# Patient Record
Sex: MALE | Race: AMERICAN INDIAN OR ALASKA NATIVE
[De-identification: names, ages, dates, MRNs, and addresses within clinical notes are randomized per-mention and may not be internally consistent; named-entity substitution may affect disease eponyms.]

---

## 2019-02-05 ENCOUNTER — HOSPITAL ENCOUNTER (INPATIENT)
Dept: HOSPITAL 42 - ED | Age: 59
LOS: 8 days | Discharge: NURSING, REHAB OR LONG-TERM CARE | DRG: 308 | End: 2019-02-13
Attending: INTERNAL MEDICINE | Admitting: INTERNAL MEDICINE
Payer: COMMERCIAL

## 2019-02-05 VITALS — BODY MASS INDEX: 72.5 KG/M2

## 2019-02-05 DIAGNOSIS — I11.0: ICD-10-CM

## 2019-02-05 DIAGNOSIS — L89.322: ICD-10-CM

## 2019-02-05 DIAGNOSIS — R53.2: ICD-10-CM

## 2019-02-05 DIAGNOSIS — I87.2: ICD-10-CM

## 2019-02-05 DIAGNOSIS — E66.2: ICD-10-CM

## 2019-02-05 DIAGNOSIS — T50.1X5A: ICD-10-CM

## 2019-02-05 DIAGNOSIS — I08.2: ICD-10-CM

## 2019-02-05 DIAGNOSIS — E87.4: ICD-10-CM

## 2019-02-05 DIAGNOSIS — Z87.81: ICD-10-CM

## 2019-02-05 DIAGNOSIS — R26.9: ICD-10-CM

## 2019-02-05 DIAGNOSIS — E55.9: ICD-10-CM

## 2019-02-05 DIAGNOSIS — L03.116: ICD-10-CM

## 2019-02-05 DIAGNOSIS — D50.9: ICD-10-CM

## 2019-02-05 DIAGNOSIS — I27.20: ICD-10-CM

## 2019-02-05 DIAGNOSIS — J44.1: ICD-10-CM

## 2019-02-05 DIAGNOSIS — J96.22: ICD-10-CM

## 2019-02-05 DIAGNOSIS — L97.929: ICD-10-CM

## 2019-02-05 DIAGNOSIS — Z91.19: ICD-10-CM

## 2019-02-05 DIAGNOSIS — J20.9: ICD-10-CM

## 2019-02-05 DIAGNOSIS — I48.91: Primary | ICD-10-CM

## 2019-02-05 DIAGNOSIS — Z74.01: ICD-10-CM

## 2019-02-05 DIAGNOSIS — I50.43: ICD-10-CM

## 2019-02-05 DIAGNOSIS — L89.312: ICD-10-CM

## 2019-02-05 DIAGNOSIS — L97.919: ICD-10-CM

## 2019-02-05 DIAGNOSIS — J44.0: ICD-10-CM

## 2019-02-05 DIAGNOSIS — L03.115: ICD-10-CM

## 2019-02-05 DIAGNOSIS — E78.5: ICD-10-CM

## 2019-02-05 DIAGNOSIS — E87.5: ICD-10-CM

## 2019-02-05 LAB
% IRON SATURATION: 8 % (ref 20–55)
ALBUMIN SERPL-MCNC: 3.7 G/DL (ref 3–4.8)
ALBUMIN/GLOB SERPL: 0.8 {RATIO} (ref 1.1–1.8)
ALT SERPL-CCNC: 18 U/L (ref 7–56)
APTT BLD: 30 SECONDS (ref 26.9–38.3)
ARTERIAL BLOOD GAS HEMOGLOBIN: 8.4 G/DL (ref 11.7–17.4)
ARTERIAL BLOOD GAS HEMOGLOBIN: 8.5 G/DL (ref 11.7–17.4)
ARTERIAL BLOOD GAS HEMOGLOBIN: 8.9 G/DL (ref 11.7–17.4)
ARTERIAL BLOOD GAS O2 SAT: 97.9 % (ref 95–98)
ARTERIAL BLOOD GAS O2 SAT: 98.5 % (ref 95–98)
ARTERIAL BLOOD GAS O2 SAT: 99 % (ref 95–98)
ARTERIAL BLOOD GAS O2 SAT: 99.4 % (ref 95–98)
ARTERIAL BLOOD GAS PCO2: 76 MM/HG (ref 35–45)
ARTERIAL BLOOD GAS PCO2: 78 MM/HG (ref 35–45)
ARTERIAL BLOOD GAS PCO2: 83 MM/HG (ref 35–45)
ARTERIAL BLOOD GAS PCO2: 95 MM/HG (ref 35–45)
ARTERIAL BLOOD GAS TCO2: 32.8 MMOL.L (ref 22–28)
ARTERIAL BLOOD GAS TCO2: 34.3 MMOL.L (ref 22–28)
ARTERIAL BLOOD GAS TCO2: 34.9 MMOL.L (ref 22–28)
ARTERIAL BLOOD GAS TCO2: 37.6 MMOL.L (ref 22–28)
AST SERPL-CCNC: 62 U/L (ref 17–59)
BNP SERPL-MCNC: 2910 PG/ML (ref 0–450)
BUN SERPL-MCNC: 30 MG/DL (ref 7–21)
CALCIUM SERPL-MCNC: 8.1 MG/DL (ref 8.4–10.5)
ERYTHROCYTE [DISTWIDTH] IN BLOOD BY AUTOMATED COUNT: 20.2 % (ref 11.5–14.5)
ERYTHROCYTE [DISTWIDTH] IN BLOOD BY AUTOMATED COUNT: 20.2 % (ref 11.5–14.5)
FERRITIN SERPL-MCNC: 20.2 NG/ML
FOLATE SERPL-MCNC: 7.7 NG/ML
GFR NON-AFRICAN AMERICAN: > 60
HCO3 BLDA-SCNC: 30.3 MMOL/L (ref 21–28)
HCO3 BLDA-SCNC: 31.9 MMOL/L (ref 21–28)
HCO3 BLDA-SCNC: 32.6 MMOL/L (ref 21–28)
HCO3 BLDA-SCNC: 34.7 MMOL/L (ref 21–28)
HDLC SERPL-MCNC: 12 MG/DL (ref 29–60)
HGB BLD-MCNC: 8.4 G/DL (ref 14–18)
HGB BLD-MCNC: 8.9 G/DL (ref 14–18)
INHALED O2 CONCENTRATION: 100 %
INHALED O2 CONCENTRATION: 100 %
INHALED O2 CONCENTRATION: 40 %
INHALED O2 CONCENTRATION: 90 %
INR PPP: 1.67
IRON SERPL-MCNC: 27 UG/DL (ref 45–180)
LDLC SERPL-MCNC: 67 MG/DL (ref 0–129)
MCH RBC QN AUTO: 19.1 PG (ref 25–35)
MCH RBC QN AUTO: 19.5 PG (ref 25–35)
MCHC RBC AUTO-ENTMCNC: 25.8 G/DL (ref 31–37)
MCHC RBC AUTO-ENTMCNC: 26.3 G/DL (ref 31–37)
MCV RBC AUTO: 72.7 FL (ref 80–105)
MCV RBC AUTO: 75.7 FL (ref 80–105)
O2 CAP BLDA-SCNC: 11.9 ML/DL (ref 16–24)
O2 CAP BLDA-SCNC: 12.1 ML/DL (ref 16–24)
O2 CAP BLDA-SCNC: 12.4 ML/DL (ref 16–24)
O2 CT BLDA-SCNC: 11.8 ML/DL (ref 15–23)
O2 CT BLDA-SCNC: 12 ML/DL (ref 15–23)
O2 CT BLDA-SCNC: 12.1 ML/DL (ref 15–23)
PH BLDA: 7.17 [PH] (ref 7.35–7.45)
PH BLDA: 7.17 [PH] (ref 7.35–7.45)
PH BLDA: 7.22 [PH] (ref 7.35–7.45)
PH BLDA: 7.24 [PH] (ref 7.35–7.45)
PLATELET # BLD: 241 10^3/UL (ref 120–450)
PLATELET # BLD: 308 10^3/UL (ref 120–450)
PMV BLD AUTO: 9.1 FL (ref 7–11)
PMV BLD AUTO: 9.8 FL (ref 7–11)
PO2 BLDA: 105 MM/HG (ref 80–100)
PO2 BLDA: 107 MM/HG (ref 80–100)
PO2 BLDA: 162 MM/HG (ref 80–100)
PO2 BLDA: 213 MM/HG (ref 80–100)
PROTHROMBIN TIME: 18.5 SECONDS (ref 9.4–12.5)
RBC # BLD AUTO: 4.4 10^6/UL (ref 3.5–6.1)
RBC # BLD AUTO: 4.56 10^6/UL (ref 3.5–6.1)
T4 FREE SERPL-MCNC: 1.08 NG/DL (ref 0.78–2.19)
T4 SERPL-MCNC: 3.9 UG/DL (ref 5.5–11)
TIBC SERPL-MCNC: 322 UG/DL (ref 261–462)
TROPONIN I SERPL-MCNC: 0.02 NG/ML
VIT B12 SERPL-MCNC: 797 PG/ML (ref 239–931)
WBC # BLD AUTO: 11 10^3/UL (ref 4.5–11)
WBC # BLD AUTO: 9.2 10^3/UL (ref 4.5–11)

## 2019-02-05 PROCEDURE — 3E033GC INTRODUCTION OF OTHER THERAPEUTIC SUBSTANCE INTO PERIPHERAL VEIN, PERCUTANEOUS APPROACH: ICD-10-PCS | Performed by: INTERNAL MEDICINE

## 2019-02-05 PROCEDURE — 30233N1 TRANSFUSION OF NONAUTOLOGOUS RED BLOOD CELLS INTO PERIPHERAL VEIN, PERCUTANEOUS APPROACH: ICD-10-PCS | Performed by: INTERNAL MEDICINE

## 2019-02-05 PROCEDURE — 5A09457 ASSISTANCE WITH RESPIRATORY VENTILATION, 24-96 CONSECUTIVE HOURS, CONTINUOUS POSITIVE AIRWAY PRESSURE: ICD-10-PCS | Performed by: INTERNAL MEDICINE

## 2019-02-05 PROCEDURE — 3E0F7GC INTRODUCTION OF OTHER THERAPEUTIC SUBSTANCE INTO RESPIRATORY TRACT, VIA NATURAL OR ARTIFICIAL OPENING: ICD-10-PCS | Performed by: INTERNAL MEDICINE

## 2019-02-05 RX ADMIN — DILTIAZEM HYDROCHLORIDE PRN MLS/HR: 100 INJECTION, POWDER, LYOPHILIZED, FOR SOLUTION INTRAVENOUS at 03:25

## 2019-02-05 RX ADMIN — METHYLPREDNISOLONE SODIUM SUCCINATE SCH MG: 40 INJECTION, POWDER, FOR SOLUTION INTRAMUSCULAR; INTRAVENOUS at 14:32

## 2019-02-05 RX ADMIN — DILTIAZEM HYDROCHLORIDE PRN MLS/HR: 100 INJECTION, POWDER, LYOPHILIZED, FOR SOLUTION INTRAVENOUS at 22:26

## 2019-02-05 RX ADMIN — HEPARIN SODIUM PRN MLS/HR: 10000 INJECTION, SOLUTION INTRAVENOUS at 14:33

## 2019-02-05 RX ADMIN — IPRATROPIUM BROMIDE AND ALBUTEROL SULFATE SCH ML: .5; 3 SOLUTION RESPIRATORY (INHALATION) at 21:00

## 2019-02-05 RX ADMIN — METHYLPREDNISOLONE SODIUM SUCCINATE SCH MG: 40 INJECTION, POWDER, FOR SOLUTION INTRAMUSCULAR; INTRAVENOUS at 21:54

## 2019-02-05 RX ADMIN — LINEZOLID SCH MLS/HR: 600 INJECTION, SOLUTION INTRAVENOUS at 21:53

## 2019-02-05 NOTE — CP.PCM.CON
<Eddy Jerome - Last Filed: 02/05/19 07:08>





History of Present Illness





- History of Present Illness


History of Present Illness: 


Consult Note for ICU, Dr. Margarita Jerome, DO PGY-1





This is a 58 y o male with PMHx morbid obesity, leg cellulitis, and chronic 

lymphedema, who presents to the ED c/o shortness of breath worsening for the 

past 7 days. Reason for ICU consult was for worsening respiratory distress, 

possible new-onset arrhythmia. Pt denies any inciting factors prior to onset of 

symptoms but notes that he has been becoming more short of breath with exertion 

at home and more recently at rest. Of note pt reports recent travel to 

[place] via airplane at the end of January. Denies hx sick contacts or recent

URI. Reports last time he had dyspnea like this he was admitted to [facility] 

in [place], but denies being treated with BiPap or ever being intubated in the 

past. Denies fever, chills, chest pain, n/v/d/c, abd pain, urinary complaints, 

or other symptoms. 





PMhx: morbid obesity, leg cellulitis, and chronic lymphedema


PSurgHx: surgery for R ankle fracture in 2006, I+D of RLE for cellulitis in 2016


Allergies: NKDA


Home meds: none


Fam hx: noncontributory


Soc hx: denies smoking, EtOH or illicit drug use





PMD: none





Review of Systems





- Constitutional


Constitutional: Fatigue.  absent: Chills, Fever, Headache, Night Sweats, Sleep 

Apnea





- Cardiovascular


Cardiovascular: Dyspnea, Dyspnea on Exertion, Leg Edema.  absent: Chest Pain, 

Palpitations





- Respiratory


Respiratory: Dyspnea, Dyspnea on Exertion.  absent: Cough, Wheezing, Chest 

Congestion, Excessive Mucous Production





- Gastrointestinal


Gastrointestinal: absent: Abdominal Pain, Constipation, Diarrhea, Nausea, 

Vomiting





- Genitourinary


Genitourinary: absent: Dysuria, Urinary Frequency, Urinary Urgency





- Neurological


Neurological: absent: Dizziness, Numbness, Headaches, Tingling





Past Patient History





- Past Social History


Smoking Status: Never Smoked





- CARDIAC


Hx Cardiac Disorders: Yes


Hx Hypertension: Yes





- PULMONARY


Hx Respiratory Disorders: No





- NEUROLOGICAL


Hx Neurological Disorder: No





- HEENT


Hx HEENT Problems: No





- RENAL


Hx Chronic Kidney Disease: No





- ENDOCRINE/METABOLIC


Hx Endocrine Disorders: No





- HEMATOLOGICAL/ONCOLOGICAL


Hx Blood Transfusions: Yes


Hx Blood Transfusion Reaction: No





- INTEGUMENTARY


Hx Dermatological Problems: No


Other/Comment: BILATERAL LYMPEDEMA WITH 2 LARGE OPENING ONE IS TO RIGHT LOWER 

LATERAL SIDE OF RIGHT LEG AND 2ND TO BACKSIDE OF THIGH.DRAINING COPIOUS 

SEROUSANGUINEOUS DRAINAGE,





- MUSCULOSKELETAL/RHEUMATOLOGICAL


Hx Musculoskeletal Disorders: Yes





- GASTROINTESTINAL


Hx Gastrointestinal Disorders: No





- GENITOURINARY/GYNECOLOGICAL


Hx Genitourinary Disorders: No





- PSYCHIATRIC


Hx Psychophysiologic Disorder: No


Hx Substance Use: No





- SURGICAL HISTORY


Hx Orthopedic Surgery: Yes (right ankle)





- ANESTHESIA


Hx Anesthesia Reactions: No


Hx Malignant Hyperthermia: No





Meds


Allergies/Adverse Reactions: 


                                    Allergies











Allergy/AdvReac Type Severity Reaction Status Date / Time


 


No Known Allergies Allergy   Verified 03/18/16 19:49














- Medications


Medications: 


                               Current Medications





Acetaminophen (Tylenol 325mg Tab)  650 mg PO Q6 PRN


   PRN Reason: TEMP>=99.5F


Acetaminophen (Tylenol 650 Mg Supp)  650 mg RC Q6H PRN


   PRN Reason: TEMP>=99.5F


Atorvastatin Calcium (Lipitor)  40 mg PO DIN TREV


Docusate Sodium (Colace)  100 mg PO TID TREV


Enoxaparin Sodium (Lovenox)  240 mg SC Q12H TREV; Protocol


Furosemide (Lasix)  40 mg IVP Q8H TREV


diltiaZEM IVPB 100mg in NS (Cardizem 100mg In Ns)  100 mls @ 5 mls/hr IV .Q20H 

PRN; Protocol


   PRN Reason: TITRATE PER MD ORDER


   Last Admin: 02/05/19 03:25 Dose:  5 mls/hr


Vancomycin HCl (Vancomycin 1gm)  1 gm in 250 mls @ 167 mls/hr IVPB Q12H TREV; P

rotocol


Meropenem/Sodium Chloride (Merrem Iv 500 Mg/Ns 50 Ml)  500 mg in 50 mls @ 100 

mls/hr IVPB Q8 TREV; Protocol


   Stop: 02/05/19 14:29


Ondansetron HCl (Zofran Inj)  4 mg IVP Q4H PRN


   PRN Reason: Nausea/Vomiting


Pantoprazole Sodium (Protonix Ec Tab)  40 mg PO 0600 TREV











Physical Exam





- Constitutional


Appears: Non-toxic, No Acute Distress





- Head Exam


Head Exam: ATRAUMATIC, NORMOCEPHALIC





- Eye Exam


Eye Exam: EOMI, Normal appearance, PERRL





- ENT Exam


ENT Exam: Mucous Membranes Moist





- Respiratory Exam


Respiratory Exam: Wheezes, Respiratory Distress.  absent: Rales, Rhonchi





- Cardiovascular Exam


Cardiovascular Exam: Tachycardia, Irregular Rhythm, +S1, +S2.  absent: Gallop, 

Rubs, Systolic Murmur





- GI/Abdominal Exam


GI & Abdominal Exam: Normal Bowel Sounds, Soft.  absent: Distended, Guarding, O

rganomegaly, Rebound, Rigid, Tenderness





- Extremities Exam


Extremities exam: Positive for: joint swelling, normal capillary refill, pedal 

pulses present


Additional comments: 


Marked lymphedema noted in extremities b/l, 





- Skin


Additional comments: 


Ulcer noted on R lower lateral side of R leg and 2nd location to backside of 

thigh, draining copious serosanguinous drainage





Results





- Vital Signs


Recent Vital Signs: 


                                Last Vital Signs











Temp  98.9 F   02/05/19 06:20


 


Pulse  89   02/05/19 06:20


 


Resp  27 H  02/05/19 06:20


 


BP  156/73 H  02/05/19 06:20


 


Pulse Ox  100   02/05/19 06:20














- Labs


Result Diagrams: 


                                 02/05/19 03:10





                                 02/05/19 03:10


Labs: 


                         Laboratory Results - last 24 hr











  02/05/19 02/05/19 02/05/19





  03:10 03:10 03:10


 


WBC   9.2 


 


RBC   4.40 


 


Hgb   8.4 L 


 


Hct   32.0 L 


 


MCV   72.7 L 


 


MCH   19.1 L 


 


MCHC   26.3 L 


 


RDW   20.2 H 


 


Plt Count   308 


 


MPV   9.8 


 


PT    18.5 H


 


INR    1.67


 


APTT    30.0


 


pCO2   


 


pO2   


 


HCO3   


 


ABG pH   


 


ABG Total CO2   


 


ABG O2 Saturation   


 


ABG O2 Content   


 


ABG Base Excess   


 


ABG Hemoglobin   


 


ABG Carboxyhemoglobin   


 


POC ABG HHb (Measured)   


 


ABG Methemoglobin   


 


ABG O2 Capacity   


 


Hgb O2 Saturation   


 


FiO2   


 


Blood Gas Comments   


 


Crit Value Called To   


 


Crit Value Called By   


 


Blood Gas Notified Time   


 


Sodium  134  


 


Potassium  4.8  


 


Chloride  96 L  


 


Carbon Dioxide  32  


 


Anion Gap  10  


 


BUN  30 H  


 


Creatinine  1.0  


 


Est GFR ( Amer)  > 60  


 


Est GFR (Non-Af Amer)  > 60  


 


Random Glucose  117 H  


 


Calcium  8.1 L  


 


Total Bilirubin  0.9  


 


AST  62 H  


 


ALT  18  


 


Alkaline Phosphatase  40  


 


Lactate Dehydrogenase  598  


 


Total Creatine Kinase  91  


 


Troponin I  0.02  D  


 


NT-Pro-B Natriuret Pep  2910 H  


 


Total Protein  8.1  


 


Albumin  3.7  


 


Globulin  4.5  


 


Albumin/Globulin Ratio  0.8 L  














  02/05/19





  05:35


 


WBC 


 


RBC 


 


Hgb 


 


Hct 


 


MCV 


 


MCH 


 


MCHC 


 


RDW 


 


Plt Count 


 


MPV 


 


PT 


 


INR 


 


APTT 


 


pCO2  83 H*


 


pO2  162.0 H


 


HCO3  30.3 H


 


ABG pH  7.17 L*


 


ABG Total CO2  32.8 H


 


ABG O2 Saturation  99.4 H


 


ABG O2 Content  11.8 L


 


ABG Base Excess  0.9


 


ABG Hemoglobin  8.4 L


 


ABG Carboxyhemoglobin  2.5 H


 


POC ABG HHb (Measured)  0.6


 


ABG Methemoglobin  0.4


 


ABG O2 Capacity  11.9 L


 


Hgb O2 Saturation  96.4


 


FiO2  100.0


 


Blood Gas Comments  Ms prisca viramontes(rrt) will bring abg results to ed


 


Crit Value Called To  Reji aguilar rn ed


 


Crit Value Called By  Adelfo


 


Blood Gas Notified Time  554


 


Sodium 


 


Potassium 


 


Chloride 


 


Carbon Dioxide 


 


Anion Gap 


 


BUN 


 


Creatinine 


 


Est GFR ( Amer) 


 


Est GFR (Non-Af Amer) 


 


Random Glucose 


 


Calcium 


 


Total Bilirubin 


 


AST 


 


ALT 


 


Alkaline Phosphatase 


 


Lactate Dehydrogenase 


 


Total Creatine Kinase 


 


Troponin I 


 


NT-Pro-B Natriuret Pep 


 


Total Protein 


 


Albumin 


 


Globulin 


 


Albumin/Globulin Ratio 














Assessment & Plan





- Assessment and Plan (Free Text)


Assessment: 


This is a 58 y o male with PMHx morbid obesity, leg cellulitis, and chronic 

lymphedema, who presents to the ED c/o shortness of breath worsening for the 

past 7 days. Reason for ICU consult was for worsening respiratory distress, 

possible new-onset arrhythmia.


Plan: 


Neuro: 


AAOx3 currently. 


No changes in mental status.


Cont  to monitor. 





CV:


/73, HR 89, irregular rhythm noted on monitor. Per RN pt was in HR up to 

150s on presentation to ED. Was given Cardizem with improvement in rate control.

Currently on Cardizem drip. R/o atrial fibrillation. 


Cardiology consulted, recs appreciated 


BNP elevated at 2910 on admission. R/o CHF exacerbation. Lasix 40 IVP q8h. 


Maintain MAP>65.


Hemodynamically stable currently


Cont to monitor.





Pulm: 


Chronic resp acidosis compensated with metabolic acidosis, f/u repeat ABG


R/o PE as etiology based on pt history, obesity hypoventilation syndrome.


V/Q scan ordered due to pt being unable to fit into CT scan machine for CT angio

chest. 


Duplex U/s LE b/l prelim read neg for DVT.


On BiPap at rate of 18/6.  


Maintain O2 sat>90%


Continue with HOB elevation> 35 degrees, aspiration precautions. 


Continue with protected lung ventilation strategies 





GI: 


NPO. Continue with Protonix.





/Renal


BUN/Cr 30/1.0. Replace lytes as needed, maintain euvolemia. Continue to monitor.


 


ID:


Afebrile, no leukocytosis. 


Treat for cellulitis


Vanco/Merrem


ID consulted, recs appreciated


Blood, wound cxs pending





Endo:


A1c pending


Random glucose 117 on admission. 





Heme:


Hgb 8.4/32.  Plts 308.  Stable. Cont to monitor. 





Psych: 


Normal mood.





DVT ppx - Lovenox bid


GI ppx - Protonix bid





Pt seen, examined with, and plan discussed with Dr. Avila, attending physicia

darrick Jerome DO PGY-1, Family Medicine Resident


Pager #[phone]





<Mekhi Avila - Last Filed: 02/05/19 21:25>





Meds





- Medications


Medications: 


                               Current Medications





Acetaminophen (Tylenol 325mg Tab)  650 mg PO Q6 PRN


   PRN Reason: TEMP>=99.5F


Acetaminophen (Tylenol 650 Mg Supp)  650 mg RC Q6H PRN


   PRN Reason: TEMP>=99.5F


Albuterol/Ipratropium (Duoneb 3 Mg/0.5 Mg (3 Ml) Ud)  3 ml IH Q4H [facility]


Atorvastatin Calcium (Lipitor)  40 mg PO DIN [facility]


   Last Admin: 02/05/19 17:46 Dose:  Not Given


Docusate Sodium (Colace)  100 mg PO TID [facility]


   Last Admin: 02/05/19 17:46 Dose:  Not Given


Furosemide (Lasix)  40 mg IVP Q8H [facility]


   Last Admin: 02/05/19 14:32 Dose:  40 mg


diltiaZEM IVPB 100mg in NS (Cardizem 100mg In Ns)  100 mls @ 5 mls/hr IV .Q20H 

PRN; Protocol


   PRN Reason: TITRATE PER MD ORDER


   Last Admin: 02/05/19 03:25 Dose:  5 mls/hr


Linezolid (Zyvox 600mg/300ml D5w)  600 mg in 300 mls @ 200 mls/hr IVPB Q12 TREV; 

Protocol


   Stop: 02/12/19 22:01


Doxycycline Hyclate 100 mg/ (Sodium Chloride)  100 mls @ 100 mls/hr IVPB Q12 

TREV; Protocol


   Last Admin: 02/05/19 14:30 Dose:  100 mls/hr


Heparin Sodium/Sodium Chloride (Heparin 05871 Units/250ml 1/2 Normal Saline)  

25,000 units in 250 mls @ 20.02 mls/hr IV .[id number] PRN; Protocol


   PRN Reason: ADJUST RATE PER PROTOCOL


   Last Admin: 02/05/19 14:33 Dose:  8.25 units/kg/hr, 20.02 mls/hr


Methylprednisolone (Solu-Medrol)  20 mg IVP Q8H TREV


   Last Admin: 02/05/19 14:32 Dose:  20 mg


Ondansetron HCl (Zofran Inj)  4 mg IVP Q4H PRN


   PRN Reason: Nausea/Vomiting


Pantoprazole Sodium (Protonix Ec Tab)  40 mg PO 0600 [facility]











Results





- Vital Signs


Recent Vital Signs: 


                                Last Vital Signs











Temp  97.9 F   02/05/19 16:00


 


Pulse  79   02/05/19 21:18


 


Resp  24   02/05/19 19:00


 


BP  131/60   02/05/19 19:00


 


Pulse Ox  92 L  02/05/19 19:00














- Labs


Result Diagrams: 


                                 02/05/19 17:30





                                 02/05/19 03:10


Labs: 


                         Laboratory Results - last 24 hr











  02/05/19 02/05/19 02/05/19





  03:10 03:10 03:10


 


WBC   9.2 


 


RBC   4.40 


 


Hgb   8.4 L 


 


Hct   32.0 L 


 


MCV   72.7 L 


 


MCH   19.1 L 


 


MCHC   26.3 L 


 


RDW   20.2 H 


 


Plt Count   308 


 


MPV   9.8 


 


PT    18.5 H


 


INR    1.67


 


APTT    30.0


 


pCO2   


 


pO2   


 


HCO3   


 


ABG pH   


 


ABG Total CO2   


 


ABG O2 Saturation   


 


ABG O2 Content   


 


ABG Base Excess   


 


ABG Hemoglobin   


 


ABG Carboxyhemoglobin   


 


POC ABG HHb (Measured)   


 


ABG Methemoglobin   


 


ABG O2 Capacity   


 


ABG Potassium   


 


Hgb O2 Saturation   


 


Glucose   


 


Lactate   


 


FiO2   


 


Inspiratory BiPAP   


 


Blood Gas Comments   


 


Crit Value Called To   


 


Crit Value Called By   


 


Blood Gas Notified Time   


 


Sodium  134  


 


Potassium  4.8  


 


Chloride  96 L  


 


Carbon Dioxide  32  


 


Anion Gap  10  


 


BUN  30 H  


 


Creatinine  1.0  


 


Est GFR ( Amer)  > 60  


 


Est GFR (Non-Af Amer)  > 60  


 


Random Glucose  117 H  


 


Hemoglobin A1c   


 


Lactic Acid   


 


Calcium  8.1 L  


 


Iron   


 


TIBC   


 


% Saturation   


 


Ferritin   


 


Total Bilirubin  0.9  


 


AST  62 H  


 


ALT  18  


 


Alkaline Phosphatase  40  


 


Lactate Dehydrogenase  598  


 


Total Creatine Kinase  91  


 


Troponin I  0.02  D  


 


NT-Pro-B Natriuret Pep  2910 H  


 


Total Protein  8.1  


 


Albumin  3.7  


 


Globulin  4.5  


 


Albumin/Globulin Ratio  0.8 L  


 


Triglycerides   


 


Cholesterol   


 


LDL Cholesterol Direct   


 


HDL Cholesterol   


 


Vitamin B12   


 


25-OH Vitamin D Total   


 


Folate   


 


Free T4   


 


Thyroxine (T4)   


 


TSH 3rd Generation   


 


Arterial Blood Potassium   


 


Blood Type   


 


Antibody Screen   


 


Crossmatch   


 


BBK History Checked   














  02/05/19 02/05/19 02/05/19





  05:35 06:00 06:00


 


WBC   


 


RBC   


 


Hgb   


 


Hct   


 


MCV   


 


MCH   


 


MCHC   


 


RDW   


 


Plt Count   


 


MPV   


 


PT   


 


INR   


 


APTT   


 


pCO2  83 H*  


 


pO2  162.0 H  


 


HCO3  30.3 H  


 


ABG pH  7.17 L*  


 


ABG Total CO2  32.8 H  


 


ABG O2 Saturation  99.4 H  


 


ABG O2 Content  11.8 L  


 


ABG Base Excess  0.9  


 


ABG Hemoglobin  8.4 L  


 


ABG Carboxyhemoglobin  2.5 H  


 


POC ABG HHb (Measured)  0.6  


 


ABG Methemoglobin  0.4  


 


ABG O2 Capacity  11.9 L  


 


ABG Potassium   


 


Hgb O2 Saturation  96.4  


 


Glucose   


 


Lactate   


 


FiO2  100.0  


 


Inspiratory BiPAP   


 


Blood Gas Comments  Ms prisca viramontes(rrt) will bring abg results to ed  


 


Crit Value Called To  Reji aguilar rn ed  


 


Crit Value Called By  [facility]  


 


Blood Gas Notified Time  554  


 


Sodium   


 


Potassium   


 


Chloride   


 


Carbon Dioxide   


 


Anion Gap   


 


BUN   


 


Creatinine   


 


Est GFR ( Amer)   


 


Est GFR (Non-Af Amer)   


 


Random Glucose   


 


Hemoglobin A1c   


 


Lactic Acid   


 


Calcium   


 


Iron   


 


TIBC   


 


% Saturation   


 


Ferritin    20.2


 


Total Bilirubin   


 


AST   


 


ALT   


 


Alkaline Phosphatase   


 


Lactate Dehydrogenase   


 


Total Creatine Kinase    84


 


Troponin I    0.02


 


NT-Pro-B Natriuret Pep   


 


Total Protein   


 


Albumin   


 


Globulin   


 


Albumin/Globulin Ratio   


 


Triglycerides    103


 


Cholesterol    103 L


 


LDL Cholesterol Direct    67


 


HDL Cholesterol    12 L


 


Vitamin B12    797


 


25-OH Vitamin D Total   


 


Folate    7.7


 


Free T4   


 


Thyroxine (T4)   


 


TSH 3rd Generation   


 


Arterial Blood Potassium   


 


Blood Type   A POSITIVE 


 


Antibody Screen   Negative 


 


Crossmatch   See Detail 


 


BBK History Checked   Patient has bt 














  02/05/19 02/05/19 02/05/19





  06:00 06:45 10:10


 


WBC   


 


RBC   


 


Hgb   


 


Hct   


 


MCV   


 


MCH   


 


MCHC   


 


RDW   


 


Plt Count   


 


MPV   


 


PT   


 


INR   


 


APTT   


 


pCO2   


 


pO2   


 


HCO3   


 


ABG pH   


 


ABG Total CO2   


 


ABG O2 Saturation   


 


ABG O2 Content   


 


ABG Base Excess   


 


ABG Hemoglobin   


 


ABG Carboxyhemoglobin   


 


POC ABG HHb (Measured)   


 


ABG Methemoglobin   


 


ABG O2 Capacity   


 


ABG Potassium   


 


Hgb O2 Saturation   


 


Glucose   


 


Lactate   


 


FiO2   


 


Inspiratory BiPAP   


 


Blood Gas Comments   


 


Crit Value Called To   


 


Crit Value Called By   


 


Blood Gas Notified Time   


 


Sodium   


 


Potassium   


 


Chloride   


 


Carbon Dioxide   


 


Anion Gap   


 


BUN   


 


Creatinine   


 


Est GFR ( Amer)   


 


Est GFR (Non-Af Amer)   


 


Random Glucose   


 


Hemoglobin A1c  6.2  


 


Lactic Acid   0.8 


 


Calcium   


 


Iron    27 L


 


TIBC    322


 


% Saturation    8 L


 


Ferritin   


 


Total Bilirubin   


 


AST   


 


ALT   


 


Alkaline Phosphatase   


 


Lactate Dehydrogenase   


 


Total Creatine Kinase   


 


Troponin I   


 


NT-Pro-B Natriuret Pep   


 


Total Protein   


 


Albumin   


 


Globulin   


 


Albumin/Globulin Ratio   


 


Triglycerides   


 


Cholesterol   


 


LDL Cholesterol Direct   


 


HDL Cholesterol   


 


Vitamin B12   


 


25-OH Vitamin D Total   


 


Folate   


 


Free T4   


 


Thyroxine (T4)   


 


TSH 3rd Generation   


 


Arterial Blood Potassium   


 


Blood Type   


 


Antibody Screen   


 


Crossmatch   


 


BBK History Checked   














  02/05/19 02/05/19 02/05/19





  10:10 10:10 10:10


 


WBC   


 


RBC   


 


Hgb   


 


Hct   


 


MCV   


 


MCH   


 


MCHC   


 


RDW   


 


Plt Count   


 


MPV   


 


PT   


 


INR   


 


APTT   


 


pCO2   


 


pO2   


 


HCO3   


 


ABG pH   


 


ABG Total CO2   


 


ABG O2 Saturation   


 


ABG O2 Content   


 


ABG Base Excess   


 


ABG Hemoglobin   


 


ABG Carboxyhemoglobin   


 


POC ABG HHb (Measured)   


 


ABG Methemoglobin   


 


ABG O2 Capacity   


 


ABG Potassium   


 


Hgb O2 Saturation   


 


Glucose   


 


Lactate   


 


FiO2   


 


Inspiratory BiPAP   


 


Blood Gas Comments   


 


Crit Value Called To   


 


Crit Value Called By   


 


Blood Gas Notified Time   


 


Sodium   


 


Potassium   


 


Chloride   


 


Carbon Dioxide   


 


Anion Gap   


 


BUN   


 


Creatinine   


 


Est GFR ( Amer)   


 


Est GFR (Non-Af Amer)   


 


Random Glucose   


 


Hemoglobin A1c   


 


Lactic Acid   


 


Calcium   


 


Iron   


 


TIBC   


 


% Saturation   


 


Ferritin   


 


Total Bilirubin   


 


AST   


 


ALT   


 


Alkaline Phosphatase   


 


Lactate Dehydrogenase   


 


Total Creatine Kinase  75  


 


Troponin I  0.02  


 


NT-Pro-B Natriuret Pep   


 


Total Protein   


 


Albumin   


 


Globulin   


 


Albumin/Globulin Ratio   


 


Triglycerides   


 


Cholesterol   


 


LDL Cholesterol Direct   


 


HDL Cholesterol   


 


Vitamin B12   


 


25-OH Vitamin D Total    < 12.8 L


 


Folate   


 


Free T4   1.08 


 


Thyroxine (T4)   3.9 L 


 


TSH 3rd Generation   2.60 


 


Arterial Blood Potassium   


 


Blood Type   


 


Antibody Screen   


 


Crossmatch   


 


BBK History Checked   














  02/05/19 02/05/19 02/05/19





  11:25 15:10 17:30


 


WBC   


 


RBC   


 


Hgb   


 


Hct   


 


MCV   


 


MCH   


 


MCHC   


 


RDW   


 


Plt Count   


 


MPV   


 


PT   


 


INR   


 


APTT   


 


pCO2  95 H*  76 H* 


 


pO2  213.0 H  107.0 H 


 


HCO3  34.7 H  32.6 H 


 


ABG pH  7.17 L*  7.24 L 


 


ABG Total CO2  37.6 H  34.9 H 


 


ABG O2 Saturation  99.0 H  98.5 H 


 


ABG O2 Content  12.0 L  


 


ABG Base Excess  4.7 H  2.8 


 


ABG Hemoglobin  8.5 L  


 


ABG Carboxyhemoglobin  2.0 H  


 


POC ABG HHb (Measured)  1.0  


 


ABG Methemoglobin  0.6  


 


ABG O2 Capacity  12.1 L  


 


ABG Potassium   4.2 


 


Hgb O2 Saturation  96.3  


 


Glucose   90 


 


Lactate   0.6 L 


 


FiO2  90.0  40.0 


 


Inspiratory BiPAP   22 


 


Blood Gas Comments   


 


Crit Value Called To  [address] m. 


 


Crit Value Called By  lin Lino louis 


 


Blood Gas Notified Time  1138  1314 


 


Sodium   137.0 


 


Potassium   


 


Chloride   103.0 


 


Carbon Dioxide   


 


Anion Gap   


 


BUN   


 


Creatinine   


 


Est GFR ( Amer)   


 


Est GFR (Non-Af Amer)   


 


Random Glucose   


 


Hemoglobin A1c   


 


Lactic Acid   


 


Calcium   


 


Iron   


 


TIBC   


 


% Saturation   


 


Ferritin   


 


Total Bilirubin   


 


AST   


 


ALT   


 


Alkaline Phosphatase   


 


Lactate Dehydrogenase   


 


Total Creatine Kinase    67


 


Troponin I    0.02


 


NT-Pro-B Natriuret Pep   


 


Total Protein   


 


Albumin   


 


Globulin   


 


Albumin/Globulin Ratio   


 


Triglycerides   


 


Cholesterol   


 


LDL Cholesterol Direct   


 


HDL Cholesterol   


 


Vitamin B12   


 


25-OH Vitamin D Total   


 


Folate   


 


Free T4   


 


Thyroxine (T4)   


 


TSH 3rd Generation   


 


Arterial Blood Potassium   4.2 


 


Blood Type   


 


Antibody Screen   


 


Crossmatch   


 


BBK History Checked   














  02/05/19 02/05/19





  17:30 20:20


 


WBC  11.0 


 


RBC  4.56 


 


Hgb  8.9 L 


 


Hct  34.5 L 


 


MCV  75.7 L D 


 


MCH  19.5 L 


 


MCHC  25.8 L 


 


RDW  20.2 H 


 


Plt Count  241 


 


MPV  9.1 


 


PT  


 


INR  


 


APTT   55.1 H


 


pCO2  


 


pO2  


 


HCO3  


 


ABG pH  


 


ABG Total CO2  


 


ABG O2 Saturation  


 


ABG O2 Content  


 


ABG Base Excess  


 


ABG Hemoglobin  


 


ABG Carboxyhemoglobin  


 


POC ABG HHb (Measured)  


 


ABG Methemoglobin  


 


ABG O2 Capacity  


 


ABG Potassium  


 


Hgb O2 Saturation  


 


Glucose  


 


Lactate  


 


FiO2  


 


Inspiratory BiPAP  


 


Blood Gas Comments  


 


Crit Value Called To  


 


Crit Value Called By  


 


Blood Gas Notified Time  


 


Sodium  


 


Potassium  


 


Chloride  


 


Carbon Dioxide  


 


Anion Gap  


 


BUN  


 


Creatinine  


 


Est GFR ( Amer)  


 


Est GFR (Non-Af Amer)  


 


Random Glucose  


 


Hemoglobin A1c  


 


Lactic Acid  


 


Calcium  


 


Iron  


 


TIBC  


 


% Saturation  


 


Ferritin  


 


Total Bilirubin  


 


AST  


 


ALT  


 


Alkaline Phosphatase  


 


Lactate Dehydrogenase  


 


Total Creatine Kinase  


 


Troponin I  


 


NT-Pro-B Natriuret Pep  


 


Total Protein  


 


Albumin  


 


Globulin  


 


Albumin/Globulin Ratio  


 


Triglycerides  


 


Cholesterol  


 


LDL Cholesterol Direct  


 


HDL Cholesterol  


 


Vitamin B12  


 


25-OH Vitamin D Total  


 


Folate  


 


Free T4  


 


Thyroxine (T4)  


 


TSH 3rd Generation  


 


Arterial Blood Potassium  


 


Blood Type  


 


Antibody Screen  


 


Crossmatch  


 


BBK History Checked  














Attending/Attestation





- Attestation


I have personally seen and examined this patient.: Yes


I have fully participated in the care of the patient.: Yes


I have reviewed all pertinent clinical information: Yes

## 2019-02-05 NOTE — CP.PCM.HP
History of Present Illness





- History of Present Illness


History of Present Illness: 





Patient is a 58 y o male with PMHx morbid obesity, leg cellulitis, and chronic 

lymphedema, who presented to the ED with complaints of shortness of breath 

worsening for the past 7 days. History was provided by patient's significant 

other who states patient was recently back from a trip to [place]. Patient 

landed in [place] then took the train to [place]. Overnight patient  

patient was noted to be in respiratory distress and was therefore transferred to

the ICU where he was found to have a new onset arrtyhthmia.  Patient denies 

history of sick contacts or recent URI, fever, chills, chest pain, n/v/d/c, abd 

pain, urinary complaints, or other symptoms. 





PMhx: morbid obesity, leg cellulitis, and chronic lymphedema


PSurgHx: surgery for R ankle fracture in 2006, I+D of RLE for cellulitis in 2016


Allergies: NKDA


Home meds: none


Fam hx: noncontributory


Soc hx: denies smoking, EtOH or illicit drug use








Review of Systems





- Constitutional


Constitutional: Fatigue.  absent: Chills, Fever, Headache, Night Sweats, Sleep 

Apnea





- Cardiovascular


Cardiovascular: Dyspnea, Dyspnea on Exertion, Leg Edema.  absent: Chest Pain, 

Palpitations





- Respiratory


Respiratory: Dyspnea, Dyspnea on Exertion.  absent: Cough, Wheezing, Chest 

Congestion, Excessive Mucous Production





- Gastrointestinal


Gastrointestinal: absent: Abdominal Pain, Constipation, Diarrhea, Nausea, 

Vomiting





- Genitourinary


Genitourinary: absent: Dysuria, Urinary Frequency, Urinary Urgency





- Neurological


Neurological: absent: Dizziness, Numbness, Headaches, Tingling








Physical Exam





- Constitutional


Appears: Non-toxic, No Acute Distress





- Head Exam


Head Exam: ATRAUMATIC, NORMOCEPHALIC





- Eye Exam


Eye Exam: EOMI, Normal appearance, PERRL





- ENT Exam


ENT Exam: Mucous Membranes Moist





- Respiratory Exam


Respiratory Exam: Wheezes, Respiratory Distress.  absent: Rales, Rhonchi





- Cardiovascular Exam


Cardiovascular Exam: Tachycardia, Irregular Rhythm, +S1, +S2.  absent: Gallop, 

Rubs, Systolic Murmur





- GI/Abdominal Exam


GI & Abdominal Exam: Normal Bowel Sounds, Soft.  absent: Distended, Guarding, 

Organomegaly, Rebound, Rigid, Tenderness





- Extremities Exam


Extremities exam: Positive for: joint swelling, normal capillary refill, pedal 

pulses present


Additional comments: 


Marked lymphedema noted in bilateral lower extremities





- Skin


Additional comments: 


Ulcer noted on R lower lateral side of R leg and 2nd location to backside of 

thigh, draining copious serosanguinous drainage











Present on Admission





- Present on Admission


Any Indicators Present on Admission: No





Past Patient History





- Past Social History


Smoking Status: Never Smoked





- CARDIAC


Hx Cardiac Disorders: Yes


Hx Hypertension: Yes





- PULMONARY


Hx Respiratory Disorders: No





- NEUROLOGICAL


Hx Neurological Disorder: No





- HEENT


Hx HEENT Problems: No





- RENAL


Hx Chronic Kidney Disease: No





- ENDOCRINE/METABOLIC


Hx Endocrine Disorders: No





- HEMATOLOGICAL/ONCOLOGICAL


Hx Blood Transfusions: Yes


Hx Blood Transfusion Reaction: No





- INTEGUMENTARY


Hx Dermatological Problems: No


Other/Comment: BILATERAL LYMPEDEMA WITH 2 LARGE OPENING ONE IS TO RIGHT LOWER 

LATERAL SIDE OF RIGHT LEG AND 2ND TO BACKSIDE OF THIGH.DRAINING COPIOUS 

SEROUSANGUINEOUS DRAINAGE,





- MUSCULOSKELETAL/RHEUMATOLOGICAL


Hx Musculoskeletal Disorders: Yes





- GASTROINTESTINAL


Hx Gastrointestinal Disorders: No





- GENITOURINARY/GYNECOLOGICAL


Hx Genitourinary Disorders: No





- PSYCHIATRIC


Hx Psychophysiologic Disorder: No


Hx Substance Use: No





- SURGICAL HISTORY


Hx Orthopedic Surgery: Yes (right ankle)





- ANESTHESIA


Hx Anesthesia Reactions: No


Hx Malignant Hyperthermia: No





Meds


Allergies/Adverse Reactions: 


                                    Allergies











Allergy/AdvReac Type Severity Reaction Status Date / Time


 


No Known Allergies Allergy   Verified 03/18/16 19:49














Results





- Vital Signs


Recent Vital Signs: 





                                Last Vital Signs











Temp  98.1 F   02/05/19 11:33


 


Pulse  79   02/05/19 11:53


 


Resp  23   02/05/19 11:33


 


BP  136/76   02/05/19 11:33


 


Pulse Ox  99   02/05/19 10:00














- Labs


Result Diagrams: 


                                 02/06/19 06:00





                                 02/06/19 06:00


Labs: 





                         Laboratory Results - last 24 hr











  02/05/19 02/05/19 02/05/19





  03:10 03:10 03:10


 


WBC   9.2 


 


RBC   4.40 


 


Hgb   8.4 L 


 


Hct   32.0 L 


 


MCV   72.7 L 


 


MCH   19.1 L 


 


MCHC   26.3 L 


 


RDW   20.2 H 


 


Plt Count   308 


 


MPV   9.8 


 


PT    18.5 H


 


INR    1.67


 


APTT    30.0


 


pCO2   


 


pO2   


 


HCO3   


 


ABG pH   


 


ABG Total CO2   


 


ABG O2 Saturation   


 


ABG O2 Content   


 


ABG Base Excess   


 


ABG Hemoglobin   


 


ABG Carboxyhemoglobin   


 


POC ABG HHb (Measured)   


 


ABG Methemoglobin   


 


ABG O2 Capacity   


 


Hgb O2 Saturation   


 


FiO2   


 


Blood Gas Comments   


 


Crit Value Called To   


 


Crit Value Called By   


 


Blood Gas Notified Time   


 


Sodium  134  


 


Potassium  4.8  


 


Chloride  96 L  


 


Carbon Dioxide  32  


 


Anion Gap  10  


 


BUN  30 H  


 


Creatinine  1.0  


 


Est GFR ( Amer)  > 60  


 


Est GFR (Non-Af Amer)  > 60  


 


Random Glucose  117 H  


 


Hemoglobin A1c   


 


Lactic Acid   


 


Calcium  8.1 L  


 


Iron   


 


TIBC   


 


% Saturation   


 


Ferritin   


 


Total Bilirubin  0.9  


 


AST  62 H  


 


ALT  18  


 


Alkaline Phosphatase  40  


 


Lactate Dehydrogenase  598  


 


Total Creatine Kinase  91  


 


Troponin I  0.02  D  


 


NT-Pro-B Natriuret Pep  2910 H  


 


Total Protein  8.1  


 


Albumin  3.7  


 


Globulin  4.5  


 


Albumin/Globulin Ratio  0.8 L  


 


Triglycerides   


 


Cholesterol   


 


LDL Cholesterol Direct   


 


HDL Cholesterol   


 


Vitamin B12   


 


Folate   


 


Free T4   


 


Thyroxine (T4)   


 


TSH 3rd Generation   


 


Blood Type   


 


Antibody Screen   


 


Crossmatch   


 


BBK History Checked   














  02/05/19 02/05/19 02/05/19





  05:35 06:00 06:00


 


WBC   


 


RBC   


 


Hgb   


 


Hct   


 


MCV   


 


MCH   


 


MCHC   


 


RDW   


 


Plt Count   


 


MPV   


 


PT   


 


INR   


 


APTT   


 


pCO2  83 H*  


 


pO2  162.0 H  


 


HCO3  30.3 H  


 


ABG pH  7.17 L*  


 


ABG Total CO2  32.8 H  


 


ABG O2 Saturation  99.4 H  


 


ABG O2 Content  11.8 L  


 


ABG Base Excess  0.9  


 


ABG Hemoglobin  8.4 L  


 


ABG Carboxyhemoglobin  2.5 H  


 


POC ABG HHb (Measured)  0.6  


 


ABG Methemoglobin  0.4  


 


ABG O2 Capacity  11.9 L  


 


Hgb O2 Saturation  96.4  


 


FiO2  100.0  


 


Blood Gas Comments  Ms prisca viramontes(rrt) will bring abg results to ed  


 


Crit Value Called To  Reji aguilar rn ed  


 


Crit Value Called By  Jsm  


 


Blood Gas Notified Time  554  


 


Sodium   


 


Potassium   


 


Chloride   


 


Carbon Dioxide   


 


Anion Gap   


 


BUN   


 


Creatinine   


 


Est GFR ( Amer)   


 


Est GFR (Non-Af Amer)   


 


Random Glucose   


 


Hemoglobin A1c   


 


Lactic Acid   


 


Calcium   


 


Iron   


 


TIBC   


 


% Saturation   


 


Ferritin    20.2


 


Total Bilirubin   


 


AST   


 


ALT   


 


Alkaline Phosphatase   


 


Lactate Dehydrogenase   


 


Total Creatine Kinase    84


 


Troponin I    0.02


 


NT-Pro-B Natriuret Pep   


 


Total Protein   


 


Albumin   


 


Globulin   


 


Albumin/Globulin Ratio   


 


Triglycerides    103


 


Cholesterol    103 L


 


LDL Cholesterol Direct    67


 


HDL Cholesterol    12 L


 


Vitamin B12    797


 


Folate    7.7


 


Free T4   


 


Thyroxine (T4)   


 


TSH 3rd Generation   


 


Blood Type   A POSITIVE 


 


Antibody Screen   Negative 


 


Crossmatch   See Detail 


 


BBK History Checked   Patient has bt 














  02/05/19 02/05/19 02/05/19





  06:00 06:45 10:10


 


WBC   


 


RBC   


 


Hgb   


 


Hct   


 


MCV   


 


MCH   


 


MCHC   


 


RDW   


 


Plt Count   


 


MPV   


 


PT   


 


INR   


 


APTT   


 


pCO2   


 


pO2   


 


HCO3   


 


ABG pH   


 


ABG Total CO2   


 


ABG O2 Saturation   


 


ABG O2 Content   


 


ABG Base Excess   


 


ABG Hemoglobin   


 


ABG Carboxyhemoglobin   


 


POC ABG HHb (Measured)   


 


ABG Methemoglobin   


 


ABG O2 Capacity   


 


Hgb O2 Saturation   


 


FiO2   


 


Blood Gas Comments   


 


Crit Value Called To   


 


Crit Value Called By   


 


Blood Gas Notified Time   


 


Sodium   


 


Potassium   


 


Chloride   


 


Carbon Dioxide   


 


Anion Gap   


 


BUN   


 


Creatinine   


 


Est GFR ( Amer)   


 


Est GFR (Non-Af Amer)   


 


Random Glucose   


 


Hemoglobin A1c  6.2  


 


Lactic Acid   0.8 


 


Calcium   


 


Iron    27 L


 


TIBC    322


 


% Saturation    8 L


 


Ferritin   


 


Total Bilirubin   


 


AST   


 


ALT   


 


Alkaline Phosphatase   


 


Lactate Dehydrogenase   


 


Total Creatine Kinase   


 


Troponin I   


 


NT-Pro-B Natriuret Pep   


 


Total Protein   


 


Albumin   


 


Globulin   


 


Albumin/Globulin Ratio   


 


Triglycerides   


 


Cholesterol   


 


LDL Cholesterol Direct   


 


HDL Cholesterol   


 


Vitamin B12   


 


Folate   


 


Free T4   


 


Thyroxine (T4)   


 


TSH 3rd Generation   


 


Blood Type   


 


Antibody Screen   


 


Crossmatch   


 


BBK History Checked   














  02/05/19 02/05/19 02/05/19





  10:10 10:10 11:25


 


WBC   


 


RBC   


 


Hgb   


 


Hct   


 


MCV   


 


MCH   


 


MCHC   


 


RDW   


 


Plt Count   


 


MPV   


 


PT   


 


INR   


 


APTT   


 


pCO2    95 H*


 


pO2    213.0 H


 


HCO3    34.7 H


 


ABG pH    7.17 L*


 


ABG Total CO2    37.6 H


 


ABG O2 Saturation    99.0 H


 


ABG O2 Content    12.0 L


 


ABG Base Excess    4.7 H


 


ABG Hemoglobin    8.5 L


 


ABG Carboxyhemoglobin    2.0 H


 


POC ABG HHb (Measured)    1.0


 


ABG Methemoglobin    0.6


 


ABG O2 Capacity    12.1 L


 


Hgb O2 Saturation    96.3


 


FiO2    90.0


 


Blood Gas Comments   


 


Crit Value Called To    williams Boucher


 


Crit Value Called By    lin Lino


 


Blood Gas Notified Time    1138


 


Sodium   


 


Potassium   


 


Chloride   


 


Carbon Dioxide   


 


Anion Gap   


 


BUN   


 


Creatinine   


 


Est GFR ( Amer)   


 


Est GFR (Non-Af Amer)   


 


Random Glucose   


 


Hemoglobin A1c   


 


Lactic Acid   


 


Calcium   


 


Iron   


 


TIBC   


 


% Saturation   


 


Ferritin   


 


Total Bilirubin   


 


AST   


 


ALT   


 


Alkaline Phosphatase   


 


Lactate Dehydrogenase   


 


Total Creatine Kinase  75  


 


Troponin I  0.02  


 


NT-Pro-B Natriuret Pep   


 


Total Protein   


 


Albumin   


 


Globulin   


 


Albumin/Globulin Ratio   


 


Triglycerides   


 


Cholesterol   


 


LDL Cholesterol Direct   


 


HDL Cholesterol   


 


Vitamin B12   


 


Folate   


 


Free T4   1.08 


 


Thyroxine (T4)   3.9 L 


 


TSH 3rd Generation   2.60 


 


Blood Type   


 


Antibody Screen   


 


Crossmatch   


 


BBK History Checked   














Assessment & Plan





- Assessment and Plan (Free Text)


Assessment: 





This is a 58 y o male with PMHx morbid obesity, leg cellulitis, and chronic lym

phedema, who presents to the ED c/o shortness of breath worsening for the past 7

days. Reason for ICU consult was for worsening respiratory distress, possible 

new-onset arrhythmia.


Plan: 


Neuro: 


AAOx3 currently. 


No changes in mental status.


Cont  to monitor. 





CV:


/73, HR 89, irregular rhythm noted on monitor. Per RN pt was in HR up to 

150s on presentation to ED. Was given Cardizem with improvement in rate control.

Currently on Cardizem drip. R/o atrial fibrillation. 


Cardiology consulted, recs appreciated 


BNP elevated at 2910 on admission. R/o CHF exacerbation. Lasix 40 IVP q8h. 


Maintain MAP>65.


Hemodynamically stable currently


Cont to monitor.





Pulm: 


Chronic resp acidosis compensated with metabolic acidosis, f/u repeat ABG


R/o PE as etiology based on pt history, obesity hypoventilation syndrome.


V/Q scan ordered due to pt being unable to fit into CT scan machine for CT angio

chest. 


Duplex U/s LE b/l prelim read neg for DVT.


On BiPap at rate of 18/6.  


Maintain O2 sat>90%


Continue with HOB elevation> 35 degrees, aspiration precautions. 


Continue with protected lung ventilation strategies 





GI: 


NPO. Continue with Protonix.





/Renal


BUN/Cr 30/1.0. Replace electrolytes as needed, maintain euvolemia. Continue to 

monitor.


 


ID:


Afebrile, no leukocytosis. 


Treat for cellulitis


Vanco/Merrem


ID consulted, recs appreciated


Blood, wound cxs pending





Endo:


A1c pending


Maintain euglycemia, normothermia





Heme:


Hgb 8.4/32.  Plts 308.  Stable. Cont to monitor. 





Psych: 


Normal mood.





DVT ppx - Lovenox bid


GI ppx - Protonix bid

## 2019-02-05 NOTE — US
HISTORY:

Leg pain and swelling. Evaluate for DVT



PHYSICIAN(S):  Johnny Segura MD.



TECHNIQUE:

Duplex sonography and color-flow Doppler with graded compression were 

used to evaluate the deep venous systems of both lower extremities. 



FINDINGS:

The exam is extremely limited by body habitus.  The lower femoral 

veins, popliteal veins, and tibial veins are not adequately seen. 



IMPRESSION:

No sonographic evidence for deep venous thrombosis in the visualized 

segments of both lower extremities. 



Extremely limited study

## 2019-02-05 NOTE — HP
DATE OF EXAM:  02/05/2019



HISTORY OF PRESENT ILLNESS:  The patient is a 58-year-old male who has been

under my care since 2016, came to the emergency room with bilateral

increasing leg swelling.  The patient states that he has not seen any

doctor since his last discharge from the rehab in 2016.  The patient is

mostly homebound and bedridden according to the patient's significant

other.  The patient was brought to the emergency room as a walk-in patient.

The patient presented with increasing leg swelling and shortness of breath.



CODE STATUS:  Full code.



LIVING WILL ADVANCE DIRECTIVE:  None.



ALLERGIES:  NONE.



Height is 6 feet.



BMI is 72.



Weight is 535 pounds.



HOME MEDICATIONS:  Are incorrect, these are the medications from the

discharge of 2016.  The patient is not taking any medications.



OCCUPATIONAL HISTORY:  Disabled.



SOCIAL HISTORY:  Negative for smoking, alcohol, or drug use.  Negative for

communicable transmissible disease.



PAST MEDICAL, SURGICAL, SOCIAL HISTORY:  The patient has a history of right

lower extremity MRSA, corynebacterium, acinetobacter baumannii cellulitis,

right leg, right calf abscess and ulceration, history of bilateral lower

extremity lymphedema, history of super massive obesity, history of incision

and drainage of the right leg abscess, history of hypertension, history of

anemia with packed red blood cell transfusion, history of hypoxemia,

history of hypovitaminosis D, history of possible alpha thalassemia,

history of iron-deficiency anemia, history of hypogonadism with decreased

testosterone level and morbid obesity, history of hypertensive heart

disease, history of mild aortic regurgitation, history of dilated left

ventricle, history of concentric left ventricular hypertrophy, history of

left ventricular ejection fraction 69%, history of systemic inflammatory

response syndrome, history of hypoalbuminemia, history of gait dysfunction,

history of super morbid obesity, history of noncompliance, history of right

ankle fracture, history of lymphedema, history of right ankle surgery,

history of cardiomegaly, history of sepsis with right leg cellulitis,

history of methicillin-resistant Staphylococcus aureus, corynebacterium,

acinetobacter baumannii, bilateral lower extremity cellulitis and abscess,

history of severe gait dysfunction.



The patient was seen in the emergency room, then in the ICU.



PHYSICAL EXAMINATION:

GENERAL:  The patient is seen lying in the bed.  The patient is massively

obese.

VITAL SIGNS:  T-max is 98.9.  Telemetry initially shows an AFib with rapid

ventricular response of 114 down to 99 down to 89.  Blood pressure 144/78,

153/73, 153/79; respirations 18, 28, 36, 27; O2 sat on 100% nonrebreather

92-98%.  The patient is seen lying in the bed.  The patient is presently on

CPAP.

HEENT AND NECK:  Head:  Normocephalic, atraumatic.  HEENT examination shows

pinkish pale conjunctivae.  Dry oral mucosa.  No neck rigidity.

CHEST:  Symmetrical.

LUNGS:  Show decreased breath sounds at the bases.  Positive creps,

crackles, rhonchi noted upper lung field.

CARDIOVASCULAR:  Shows S1, S2 with irregular rhythm.  Positive systolic

murmur left sternal border, right second intercostal space, left second

intercostal space.

ABDOMEN:  Morbidly obese.  Unable to appreciate any hepatosplenomegaly

_____.

GENITALIA:  Could not be examined,

EXTREMITIES:  Lower extremity shows chronic and severe lymphedema with

chronic skin changes and overgrown toenails and poor foot hygiene.

MUSCULOSKELETAL:  Shows a body mass index of 72.6.  Weight is greater than

535 pounds.  Gait examination not tested.

PSYCHIATRIC:  Not applicable.



DIAGNOSTICS:  Show hemoglobin/hematocrit 8.4 and 32.  PT/PTT 18.5 and 30.0.

ABG shows a pH of 7.17, pCO2 of 83 pO2 of 162, bicarb 31, saturation of

99.1.  Sodium 134, potassium 4.8, chloride 96, CO2 32, anion gap 10, BUN

30, creatinine 1.0, GFR greater than 60, glucose 117, lactic acid 0.8,

calcium 8.1.  AST 62, troponin 0.02.  BNP 2910.  Cholesterol 103,

triglycerides 103, LDL 67, HDL 12.  The patient had a chest x-ray done,

which shows cardiomegaly.  Venous Doppler of the lower extremities was done

in the emergency room, which was limited study, but negative for DVT.



The patient was seen in the emergency room by the ER physician.  EKG shows

new onset atrial fibrillation, heart rate in low 100s and 110 beats per

minute.



IMPRESSION AND PLAN

1.  New-onset atrial fibrillation with rapid to well-controlled ventricular

response.

2.  Possible acute systolic congestive heart failure.

3.  Massive lymphedema of the lower extremity.

4.  Anemia.

5.  Hypertension.

6.  Tachypnea.

7.  Hypoxemia.

8.  Microcytic anemia.

9.  Respiratory acidosis with hypercarbia and CO2 narcosis.

10.  Prerenal kidney injury.

11.  Hyperglycemia.

12.  Cardiomegaly.

13.  Anemia.

14.  Neurosis.

15.  Chronic venous stasis ulceration of the bilateral lower extremity.

16.  Hypertension.

17.  Gait dysfunction.

18.  Super morbid obesity with gait dysfunction and possible functional

quadriplegia.



PLAN:  At this time, the patient is admitted to Intensive Care Unit.  The

patient's B12, iron studies, hemoglobin A1c, lipid panel have been ordered.

Iron studies, thyroid panel, vitamin D, serial cardiac enzymes, serial labs

ordered.  Erythropoietin, vitamin D 25-hydroxy.  Repeat ABG ordered. 

Current consultation, Infectious Disease, Cardiology and Podiatry.  The

patient will be ordered 1 unit of PRBC today.  The patient is presently on

Cardizem drip 5 mg an hour, Colace 100 three times a day, Lasix was given

100 mg in the ER.  The patient is on Lasix 40 IV every 8 hours, Lipitor 40

mg daily, Lovenox 240 mg subcutaneously every 12 hours.  The patient is

started on meropenem 500 mg IV every 8 hours, Protonix 40 mg daily for GI

prophylaxis, Tylenol p.r.n., p.o. suppository.  The patient is also ordered

vancomycin 1 g IV every 12 hours, Zofran 4 IV every 4 hours.  VQ scan has

been ordered.  The patient has been ordered BiPAP, 18/6 FIO2 90%, rate of

15 by the medical ICU resident.  The patient has been ordered echo with

Doppler.  The patient has been ordered out of the bed at 30 degrees.  At

present, the patient was seen and examined in the ICU with the patient's

significant other at bedside.  The patient has been explained about the

details of his medical condition, need for further diagnostic therapeutic

intervention, need for further treatment, need for evaluation by other

consultants, and need for further diagnostic therapeutic intervention.  All

details discussed and explained to the patient and the patient's

significant other, and all questions concerned answered



The patient's overall prognosis is guarded to poor.



Time spent in the critical care management including review of the data,

examining the patient, discussion with the family and entire management is

more than 1 hour 55 minutes.



Dictated and electronically signed, not read.







__________________________________________

Varinder Mooney MD





DD:  02/05/2019 8:28:17

DT:  02/05/2019 8:34:41

[facility] # [id number]

## 2019-02-05 NOTE — CON
DATE:  02/05/2019



HISTORY OF PRESENT ILLNESS:  The patient is 58-year-old gentleman with

history of morbid obesity, OHS with chronic

CO2 retention, who presented to [facility] ER last night with

shortness of breath.  He reports that his symptoms started a few days

earlier and was getting progressively worse.  There were no alleviating or

aggravating factors.  Not associated with chest pain.  He also reports that

he had new skin changes in his right lower leg, but he denies fever,

chills, sweats, nausea, vomiting, diarrhea, or constipation..  He reports

that these episodes of shortness of breath started shortly after his lone

air travel as well..  Upon evaluation at [facility] ER, he was

found to have new-onset atrial fibrillation as well as significant

respiratory acidosis.  The patient denies sick contacts or recent URI.



PAST MEDICAL HISTORY:  Morbid obesity, leg cellulitis, chronic lymphedema.



PAST SURGICAL HISTORY:  The patient had surgery for right ankle fracture in

2006, I and D of right lower extremity cellulitis in 2016.



ALLERGIES:  NKDA.



HOME MEDICATIONS:  None.



SOCIAL HISTORY:  No alcohol or illicit drug abuse.  No tobacco smoking.



REVIEW OF SYSTEM:  Review of 12-organ system other than mentioned in

history of present illness is negative.



PHYSICAL EXAMINATION:

VITAL SIGNS:  Blood pressure 136/76, temperature 98.1, heart rate 79,

oxygen saturation 99% on 40% FIO2.

ENT:  Head and neck atraumatic.  Very obese.

LUNGS:  Few wheezes bilaterally.

HEART:  Irregular rate and rhythm.  S1, S2 distant.

ABDOMEN:  Soft, nontender, nondistended.

MUSCULOSKELETAL:  Very obese.

NEUROLOGIC:  The patient moves all extremities spontaneously.

SKIN:  Moist.

PSYCHIATRIC:  The patient lethargic, however, responding to commands.



LABORATORY DATA:  WBC 9.2, hemoglobin 8.4, platelet count 308.  Sodium 134,

potassium 4.8, chloride 96, carbon dioxide 32, BUN 30, creatinine 1,

glucose 117.  ProBNP 2910.  AST 62, ALT 18, total bilirubin 0.9.  CPK 74. 

Troponin 0.02.  INR 1.67.  ABG 7.17/83/162 on 100% FIO2.  However, ABG 6

hours later showed 7.17/95/213 on 40% FIO2.



Chest x-ray appears to show bilateral vascular congestion.  However, it may

be overlapping adipose tissue.  The official read; no active pulmonary

disease.  EKG showed atrial fibrillation.  VQ scan was ordered by nighttime

team and the patient is currently at VQ scan.



CURRENT MEDICATION:  DuoNeb every 4 hours, Lipitor, Cardizem drip, Colace,

doxycycline, Lasix 40 mg IV every 8 hours, heparin drip, Solu-Medrol 20 mg

IV every 8 hours, Zofran p.r.n., Protonix, linezolid.



ASSESSMENT AND PLAN:  This is a 58-year-old gentleman with hypercapnic

respiratory failure, most likely due to combination of factors including

OHS with some component of chronic

obstructive pulmonary disease. Besides reduced FRC due to severe obesity 
increases airway resistance adding to pathogenesis of chronic hypercapnic 
respiratory failure. The patient does have history of

significant diastolic dysfunction and it is likely that it also plays some

role in worsening of the patient's ventilatory status, via increased airway's 
edema and thus bronchial resistance .  We will order

follow up echo to better qualitate/quantitate LV diastolic function.  We will 
proceed with antibiotics, steroid taper,

bronchodilators.  Acute cellulitis is a possibility and ID service is

following the patient as well.  The patient is on linezolid and doxycycline

to cover for potential soft tissue as well as lung source of infection.  We

will continue to target euvolemia, euglycemia, normothermia and saturation

more than 90%.  We will continue with maintaining mean arterial pressure

more than 65 avoiding hyperchloremia and nephrotoxins but not at expense of

treating underlying disease.  We will maintain blood glucose within 140-180

range according to NICE-SUGAR trial.  We will get blood culture,

urine culture, procalcitonin.  We will have low threshold for intubation. 

BiPAP was increased from 18 to 22..  If subsequent ABG will not improve, we

will proceed with endotracheal intubation.  I spoke with anesthesiology

service for the backup as the patient is morbidly obese.  We will start the

patient on therapeutic anticoagulation to prevent stroke in the setting of

atrial fibrillation as well.  Cardiology consult, echocardiogram.



Addendum: repeated ABG showed improved ventilation with ph 7.24 (from 7.17) and 
pc02 down to 70s from 90s. Will continue with BPAP, serial ABGs, low threshold 
for intubation (in which case anesthesia service should be present at 
bedside)-->that was signed out to nighttime hospitalist.



ccm time 40 min







__________________________________________

Joshua He MD





DD:  02/05/2019 13:09:15

DT:  02/05/2019 13:14:04

[facility] # [id number]



LUIS

## 2019-02-05 NOTE — CARD
--------------- APPROVED REPORT --------------





Date of service: 02/05/2019



EKG Measurement

Heart [id number]

[id number]

[id number]

[id number]



<Conclusion>

Atrial fibrillation with rapid ventricular response with premature 

ventricular or aberrantly conducted complexes

Possible Inferior infarct, age undetermined

Poor R wave progression in Precordial leads

Abnormal ECG

## 2019-02-05 NOTE — CP.PCM.CON
<Tacos Murphy - Last Filed: 02/05/19 14:47>





History of Present Illness





- History of Present Illness


History of Present Illness: 





ID Consult Note





58 year old male with past medical history of chronic lymphedema, cellulitis, 

and morbid obesity presents to the hospital for worsening shortness of breath x 

7 days. Patient is obtunded in ICU, history obtained from prior medical records 

and fiance at bedside. Patient has had symptoms like this in the past in which 

he was admitted to [facility].











Medica hx: morbid obesity, leg cellulitis, and chronic lymphedema


Surgical Hx: surgery for R ankle fracture in 2006, I+D of RLE for cellulitis in 

2016


Allergies: NKDA


Medications: Reviewed, as per MAR


Family hx: Unknown


Social hx: Denies smoking, EtOH or illicit drug use





Review of Systems





- Review of Systems


Systems not reviewed;Unavailable: Respiratory Distress





Past Patient History





- Past Social History


Smoking Status: Never Smoked





- CARDIAC


Hx Cardiac Disorders: Yes


Hx Hypertension: Yes





- PULMONARY


Hx Respiratory Disorders: No





- NEUROLOGICAL


Hx Neurological Disorder: No





- HEENT


Hx HEENT Problems: No





- RENAL


Hx Chronic Kidney Disease: No





- ENDOCRINE/METABOLIC


Hx Endocrine Disorders: No





- HEMATOLOGICAL/ONCOLOGICAL


Hx Blood Transfusions: Yes


Hx Blood Transfusion Reaction: No





- INTEGUMENTARY


Hx Dermatological Problems: No


Other/Comment: BILATERAL LYMPEDEMA WITH 2 LARGE OPENING ONE IS TO RIGHT LOWER 

LATERAL SIDE OF RIGHT LEG AND 2ND TO BACKSIDE OF THIGH.DRAINING COPIOUS 

SEROUSANGUINEOUS DRAINAGE,





- MUSCULOSKELETAL/RHEUMATOLOGICAL


Hx Musculoskeletal Disorders: Yes





- GASTROINTESTINAL


Hx Gastrointestinal Disorders: No





- GENITOURINARY/GYNECOLOGICAL


Hx Genitourinary Disorders: No





- PSYCHIATRIC


Hx Psychophysiologic Disorder: No


Hx Substance Use: No





- SURGICAL HISTORY


Hx Orthopedic Surgery: Yes (right ankle)





- ANESTHESIA


Hx Anesthesia Reactions: No


Hx Malignant Hyperthermia: No





Meds


Allergies/Adverse Reactions: 


                                    Allergies











Allergy/AdvReac Type Severity Reaction Status Date / Time


 


No Known Allergies Allergy   Verified 03/18/16 19:49














- Medications


Medications: 


                               Current Medications





Acetaminophen (Tylenol 325mg Tab)  650 mg PO Q6 PRN


   PRN Reason: TEMP>=99.5F


Acetaminophen (Tylenol 650 Mg Supp)  650 mg RC Q6H PRN


   PRN Reason: TEMP>=99.5F


Albuterol/Ipratropium (Duoneb 3 Mg/0.5 Mg (3 Ml) Ud)  3 ml IH Q4H TREV


Atorvastatin Calcium (Lipitor)  40 mg PO DIN [facility]


Docusate Sodium (Colace)  100 mg PO TID [facility]


   Last Admin: 02/05/19 09:11 Dose:  Not Given


Furosemide (Lasix)  40 mg IVP Q8H [facility]


   Last Admin: 02/05/19 08:25 Dose:  40 mg


diltiaZEM IVPB 100mg in NS (Cardizem 100mg In Ns)  100 mls @ 5 mls/hr IV .Q20H 

PRN; Protocol


   PRN Reason: TITRATE PER MD ORDER


   Last Admin: 02/05/19 03:25 Dose:  5 mls/hr


Linezolid (Zyvox 600mg/300ml D5w)  600 mg in 300 mls @ 200 mls/hr IVPB Q12 TREV; 

Protocol


   Stop: 02/12/19 22:01


Doxycycline Hyclate 100 mg/ (Sodium Chloride)  100 mls @ 100 mls/hr IVPB Q12 

TREV; Protocol


Heparin Sodium/Sodium Chloride (Heparin 81468 Units/250ml 1/2 Normal Saline)  

25,000 units in 250 mls @ 20.02 mls/hr IV .[id number] PRN; Protocol


   PRN Reason: ADJUST RATE PER PROTOCOL


Methylprednisolone (Solu-Medrol)  20 mg IVP Q8H [facility]


Ondansetron HCl (Zofran Inj)  4 mg IVP Q4H PRN


   PRN Reason: Nausea/Vomiting


Pantoprazole Sodium (Protonix Ec Tab)  40 mg PO 0600 [facility]











Physical Exam





- Constitutional


Appears: Toxic, In Acute Distress


Additional comments: 





Morbidly obese





- Head Exam


Head Exam: ATRAUMATIC, NORMAL INSPECTION, NORMOCEPHALIC





- ENT Exam


ENT Exam: Mucous Membranes Dry





- Respiratory Exam


Respiratory Exam: Decreased Breath Sounds.  absent: Rhonchi


Additional comments: 





On BIPAP





- Cardiovascular Exam


Cardiovascular Exam: RRR, +S1, +S2





- GI/Abdominal Exam


GI & Abdominal Exam: Normal Bowel Sounds, Soft.  absent: Tenderness





- Extremities Exam


Extremities exam: Positive for: pedal edema (+2 b/l edema )





- Neurological Exam


Neurological exam: Alert





- Psychiatric Exam


Psychiatric exam: Normal Affect, Normal Mood





- Skin


Additional comments: 





Chronic lymphedema skin changes in b/l lower extremities 





Results





- Vital Signs


Recent Vital Signs: 


                                Last Vital Signs











Temp  98.1 F   02/05/19 12:30


 


Pulse  84   02/05/19 12:30


 


Resp  26 H  02/05/19 12:30


 


BP  136/91 H  02/05/19 12:30


 


Pulse Ox  99   02/05/19 10:00














- Labs


Result Diagrams: 


                                 02/05/19 03:10





                                 02/05/19 03:10


Labs: 


                         Laboratory Results - last 24 hr











  02/05/19 02/05/19 02/05/19





  03:10 03:10 03:10


 


WBC   9.2 


 


RBC   4.40 


 


Hgb   8.4 L 


 


Hct   32.0 L 


 


MCV   72.7 L 


 


MCH   19.1 L 


 


MCHC   26.3 L 


 


RDW   20.2 H 


 


Plt Count   308 


 


MPV   9.8 


 


PT    18.5 H


 


INR    1.67


 


APTT    30.0


 


pCO2   


 


pO2   


 


HCO3   


 


ABG pH   


 


ABG Total CO2   


 


ABG O2 Saturation   


 


ABG O2 Content   


 


ABG Base Excess   


 


ABG Hemoglobin   


 


ABG Carboxyhemoglobin   


 


POC ABG HHb (Measured)   


 


ABG Methemoglobin   


 


ABG O2 Capacity   


 


Hgb O2 Saturation   


 


FiO2   


 


Blood Gas Comments   


 


Crit Value Called To   


 


Crit Value Called By   


 


Blood Gas Notified Time   


 


Sodium  134  


 


Potassium  4.8  


 


Chloride  96 L  


 


Carbon Dioxide  32  


 


Anion Gap  10  


 


BUN  30 H  


 


Creatinine  1.0  


 


Est GFR ( Amer)  > 60  


 


Est GFR (Non-Af Amer)  > 60  


 


Random Glucose  117 H  


 


Hemoglobin A1c   


 


Lactic Acid   


 


Calcium  8.1 L  


 


Iron   


 


TIBC   


 


% Saturation   


 


Ferritin   


 


Total Bilirubin  0.9  


 


AST  62 H  


 


ALT  18  


 


Alkaline Phosphatase  40  


 


Lactate Dehydrogenase  598  


 


Total Creatine Kinase  91  


 


Troponin I  0.02  D  


 


NT-Pro-B Natriuret Pep  2910 H  


 


Total Protein  8.1  


 


Albumin  3.7  


 


Globulin  4.5  


 


Albumin/Globulin Ratio  0.8 L  


 


Triglycerides   


 


Cholesterol   


 


LDL Cholesterol Direct   


 


HDL Cholesterol   


 


Vitamin B12   


 


Folate   


 


Free T4   


 


Thyroxine (T4)   


 


TSH 3rd Generation   


 


Blood Type   


 


Antibody Screen   


 


Crossmatch   


 


BBK History Checked   














  02/05/19 02/05/19 02/05/19





  05:35 06:00 06:00


 


WBC   


 


RBC   


 


Hgb   


 


Hct   


 


MCV   


 


MCH   


 


MCHC   


 


RDW   


 


Plt Count   


 


MPV   


 


PT   


 


INR   


 


APTT   


 


pCO2  83 H*  


 


pO2  162.0 H  


 


HCO3  30.3 H  


 


ABG pH  7.17 L*  


 


ABG Total CO2  32.8 H  


 


ABG O2 Saturation  99.4 H  


 


ABG O2 Content  11.8 L  


 


ABG Base Excess  0.9  


 


ABG Hemoglobin  8.4 L  


 


ABG Carboxyhemoglobin  2.5 H  


 


POC ABG HHb (Measured)  0.6  


 


ABG Methemoglobin  0.4  


 


ABG O2 Capacity  11.9 L  


 


Hgb O2 Saturation  96.4  


 


FiO2  100.0  


 


Blood Gas Comments  Ms prisca viramontes(rrt) will bring abg results to ed  


 


Crit Value Called To  Reji aguilar rn ed  


 


Crit Value Called By  Jsm  


 


Blood Gas Notified Time  554  


 


Sodium   


 


Potassium   


 


Chloride   


 


Carbon Dioxide   


 


Anion Gap   


 


BUN   


 


Creatinine   


 


Est GFR ( Amer)   


 


Est GFR (Non-Af Amer)   


 


Random Glucose   


 


Hemoglobin A1c   


 


Lactic Acid   


 


Calcium   


 


Iron   


 


TIBC   


 


% Saturation   


 


Ferritin    20.2


 


Total Bilirubin   


 


AST   


 


ALT   


 


Alkaline Phosphatase   


 


Lactate Dehydrogenase   


 


Total Creatine Kinase    84


 


Troponin I    0.02


 


NT-Pro-B Natriuret Pep   


 


Total Protein   


 


Albumin   


 


Globulin   


 


Albumin/Globulin Ratio   


 


Triglycerides    103


 


Cholesterol    103 L


 


LDL Cholesterol Direct    67


 


HDL Cholesterol    12 L


 


Vitamin B12    797


 


Folate    7.7


 


Free T4   


 


Thyroxine (T4)   


 


TSH 3rd Generation   


 


Blood Type   A POSITIVE 


 


Antibody Screen   Negative 


 


Crossmatch   See Detail 


 


BBK History Checked   Patient has bt 














  02/05/19 02/05/19 02/05/19





  06:00 06:45 10:10


 


WBC   


 


RBC   


 


Hgb   


 


Hct   


 


MCV   


 


MCH   


 


MCHC   


 


RDW   


 


Plt Count   


 


MPV   


 


PT   


 


INR   


 


APTT   


 


pCO2   


 


pO2   


 


HCO3   


 


ABG pH   


 


ABG Total CO2   


 


ABG O2 Saturation   


 


ABG O2 Content   


 


ABG Base Excess   


 


ABG Hemoglobin   


 


ABG Carboxyhemoglobin   


 


POC ABG HHb (Measured)   


 


ABG Methemoglobin   


 


ABG O2 Capacity   


 


Hgb O2 Saturation   


 


FiO2   


 


Blood Gas Comments   


 


Crit Value Called To   


 


Crit Value Called By   


 


Blood Gas Notified Time   


 


Sodium   


 


Potassium   


 


Chloride   


 


Carbon Dioxide   


 


Anion Gap   


 


BUN   


 


Creatinine   


 


Est GFR ( Amer)   


 


Est GFR (Non-Af Amer)   


 


Random Glucose   


 


Hemoglobin A1c  6.2  


 


Lactic Acid   0.8 


 


Calcium   


 


Iron    27 L


 


TIBC    322


 


% Saturation    8 L


 


Ferritin   


 


Total Bilirubin   


 


AST   


 


ALT   


 


Alkaline Phosphatase   


 


Lactate Dehydrogenase   


 


Total Creatine Kinase   


 


Troponin I   


 


NT-Pro-B Natriuret Pep   


 


Total Protein   


 


Albumin   


 


Globulin   


 


Albumin/Globulin Ratio   


 


Triglycerides   


 


Cholesterol   


 


LDL Cholesterol Direct   


 


HDL Cholesterol   


 


Vitamin B12   


 


Folate   


 


Free T4   


 


Thyroxine (T4)   


 


TSH 3rd Generation   


 


Blood Type   


 


Antibody Screen   


 


Crossmatch   


 


BBK History Checked   














  02/05/19 02/05/19 02/05/19





  10:10 10:10 11:25


 


WBC   


 


RBC   


 


Hgb   


 


Hct   


 


MCV   


 


MCH   


 


MCHC   


 


RDW   


 


Plt Count   


 


MPV   


 


PT   


 


INR   


 


APTT   


 


pCO2    95 H*


 


pO2    213.0 H


 


HCO3    34.7 H


 


ABG pH    7.17 L*


 


ABG Total CO2    37.6 H


 


ABG O2 Saturation    99.0 H


 


ABG O2 Content    12.0 L


 


ABG Base Excess    4.7 H


 


ABG Hemoglobin    8.5 L


 


ABG Carboxyhemoglobin    2.0 H


 


POC ABG HHb (Measured)    1.0


 


ABG Methemoglobin    0.6


 


ABG O2 Capacity    12.1 L


 


Hgb O2 Saturation    96.3


 


FiO2    90.0


 


Blood Gas Comments   


 


Crit Value Called To    williams Boucher


 


Crit Value Called By    lin Lino


 


Blood Gas Notified Time    1138


 


Sodium   


 


Potassium   


 


Chloride   


 


Carbon Dioxide   


 


Anion Gap   


 


BUN   


 


Creatinine   


 


Est GFR ( Amer)   


 


Est GFR (Non-Af Amer)   


 


Random Glucose   


 


Hemoglobin A1c   


 


Lactic Acid   


 


Calcium   


 


Iron   


 


TIBC   


 


% Saturation   


 


Ferritin   


 


Total Bilirubin   


 


AST   


 


ALT   


 


Alkaline Phosphatase   


 


Lactate Dehydrogenase   


 


Total Creatine Kinase  75  


 


Troponin I  0.02  


 


NT-Pro-B Natriuret Pep   


 


Total Protein   


 


Albumin   


 


Globulin   


 


Albumin/Globulin Ratio   


 


Triglycerides   


 


Cholesterol   


 


LDL Cholesterol Direct   


 


HDL Cholesterol   


 


Vitamin B12   


 


Folate   


 


Free T4   1.08 


 


Thyroxine (T4)   3.9 L 


 


TSH 3rd Generation   2.60 


 


Blood Type   


 


Antibody Screen   


 


Crossmatch   


 


BBK History Checked   














Assessment & Plan





- Assessment and Plan (Free Text)


Plan: 





Questionable cellulitis


Questionable CHF vs COPD exacerbation


Hx of chronic lympedema 


Hx of HTN


Hx of morbid obesity


Hx of lower extremity cellulitis with Actinobacter baumanii





Plan


Chest x-ray reviewed


Will stop Merrem


Will change Vancomycin to Zyvox


Will add Doxycycline 


Follow up cultures 


Continue to monitor closely 








Jeffrey, PGY-3





<Aron Grier S - Last Filed: 02/05/19 15:26>





Meds





- Medications


Medications: 


                               Current Medications





Acetaminophen (Tylenol 325mg Tab)  650 mg PO Q6 PRN


   PRN Reason: TEMP>=99.5F


Acetaminophen (Tylenol 650 Mg Supp)  650 mg RC Q6H PRN


   PRN Reason: TEMP>=99.5F


Albuterol/Ipratropium (Duoneb 3 Mg/0.5 Mg (3 Ml) Ud)  3 ml IH Q4H [facility]


Atorvastatin Calcium (Lipitor)  40 mg PO DIN [facility]


Docusate Sodium (Colace)  100 mg PO TID [facility]


   Last Admin: 02/05/19 14:29 Dose:  Not Given


Furosemide (Lasix)  40 mg IVP Q8H [facility]


   Last Admin: 02/05/19 14:32 Dose:  40 mg


diltiaZEM IVPB 100mg in NS (Cardizem 100mg In Ns)  100 mls @ 5 mls/hr IV .Q20H 

PRN; Protocol


   PRN Reason: TITRATE PER MD ORDER


   Last Admin: 02/05/19 03:25 Dose:  5 mls/hr


Linezolid (Zyvox 600mg/300ml D5w)  600 mg in 300 mls @ 200 mls/hr IVPB Q12 TREV; 

Protocol


   Stop: 02/12/19 22:01


Doxycycline Hyclate 100 mg/ (Sodium Chloride)  100 mls @ 100 mls/hr IVPB Q12 

TREV; Protocol


   Last Admin: 02/05/19 14:30 Dose:  100 mls/hr


Heparin Sodium/Sodium Chloride (Heparin 95353 Units/250ml 1/2 Normal Saline)  

25,000 units in 250 mls @ 20.02 mls/hr IV .[id number] PRN; Protocol


   PRN Reason: ADJUST RATE PER PROTOCOL


   Last Admin: 02/05/19 14:33 Dose:  8.25 units/kg/hr, 20.02 mls/hr


Methylprednisolone (Solu-Medrol)  20 mg IVP Q8H [facility]


   Last Admin: 02/05/19 14:32 Dose:  20 mg


Ondansetron HCl (Zofran Inj)  4 mg IVP Q4H PRN


   PRN Reason: Nausea/Vomiting


Pantoprazole Sodium (Protonix Ec Tab)  40 mg PO 0600 [facility]











Results





- Vital Signs


Recent Vital Signs: 


                                Last Vital Signs











Temp  97.5 F L  02/05/19 14:25


 


Pulse  73   02/05/19 14:25


 


Resp  28 H  02/05/19 14:25


 


BP  146/74   02/05/19 14:32


 


Pulse Ox  99   02/05/19 10:00














- Labs


Result Diagrams: 


                                 02/05/19 03:10





                                 02/05/19 03:10


Labs: 


                         Laboratory Results - last 24 hr











  02/05/19 02/05/19 02/05/19





  03:10 03:10 03:10


 


WBC   9.2 


 


RBC   4.40 


 


Hgb   8.4 L 


 


Hct   32.0 L 


 


MCV   72.7 L 


 


MCH   19.1 L 


 


MCHC   26.3 L 


 


RDW   20.2 H 


 


Plt Count   308 


 


MPV   9.8 


 


PT    18.5 H


 


INR    1.67


 


APTT    30.0


 


pCO2   


 


pO2   


 


HCO3   


 


ABG pH   


 


ABG Total CO2   


 


ABG O2 Saturation   


 


ABG O2 Content   


 


ABG Base Excess   


 


ABG Hemoglobin   


 


ABG Carboxyhemoglobin   


 


POC ABG HHb (Measured)   


 


ABG Methemoglobin   


 


ABG O2 Capacity   


 


ABG Potassium   


 


Hgb O2 Saturation   


 


Glucose   


 


Lactate   


 


FiO2   


 


Inspiratory BiPAP   


 


Blood Gas Comments   


 


Crit Value Called To   


 


Crit Value Called By   


 


Blood Gas Notified Time   


 


Sodium  134  


 


Potassium  4.8  


 


Chloride  96 L  


 


Carbon Dioxide  32  


 


Anion Gap  10  


 


BUN  30 H  


 


Creatinine  1.0  


 


Est GFR ( Amer)  > 60  


 


Est GFR (Non-Af Amer)  > 60  


 


Random Glucose  117 H  


 


Hemoglobin A1c   


 


Lactic Acid   


 


Calcium  8.1 L  


 


Iron   


 


TIBC   


 


% Saturation   


 


Ferritin   


 


Total Bilirubin  0.9  


 


AST  62 H  


 


ALT  18  


 


Alkaline Phosphatase  40  


 


Lactate Dehydrogenase  598  


 


Total Creatine Kinase  91  


 


Troponin I  0.02  D  


 


NT-Pro-B Natriuret Pep  2910 H  


 


Total Protein  8.1  


 


Albumin  3.7  


 


Globulin  4.5  


 


Albumin/Globulin Ratio  0.8 L  


 


Triglycerides   


 


Cholesterol   


 


LDL Cholesterol Direct   


 


HDL Cholesterol   


 


Vitamin B12   


 


Folate   


 


Free T4   


 


Thyroxine (T4)   


 


TSH 3rd Generation   


 


Arterial Blood Potassium   


 


Blood Type   


 


Antibody Screen   


 


Crossmatch   


 


BBK History Checked   














  02/05/19 02/05/19 02/05/19





  05:35 06:00 06:00


 


WBC   


 


RBC   


 


Hgb   


 


Hct   


 


MCV   


 


MCH   


 


MCHC   


 


RDW   


 


Plt Count   


 


MPV   


 


PT   


 


INR   


 


APTT   


 


pCO2  83 H*  


 


pO2  162.0 H  


 


HCO3  30.3 H  


 


ABG pH  7.17 L*  


 


ABG Total CO2  32.8 H  


 


ABG O2 Saturation  99.4 H  


 


ABG O2 Content  11.8 L  


 


ABG Base Excess  0.9  


 


ABG Hemoglobin  8.4 L  


 


ABG Carboxyhemoglobin  2.5 H  


 


POC ABG HHb (Measured)  0.6  


 


ABG Methemoglobin  0.4  


 


ABG O2 Capacity  11.9 L  


 


ABG Potassium   


 


Hgb O2 Saturation  96.4  


 


Glucose   


 


Lactate   


 


FiO2  100.0  


 


Inspiratory BiPAP   


 


Blood Gas Comments  Ms prisca viramontes(rrt) will bring abg results to ed  


 


Crit Value Called To  Reji aguilar rn ed  


 


Crit Value Called By  Adelfo  


 


Blood Gas Notified Time  554  


 


Sodium   


 


Potassium   


 


Chloride   


 


Carbon Dioxide   


 


Anion Gap   


 


BUN   


 


Creatinine   


 


Est GFR ( Amer)   


 


Est GFR (Non-Af Amer)   


 


Random Glucose   


 


Hemoglobin A1c   


 


Lactic Acid   


 


Calcium   


 


Iron   


 


TIBC   


 


% Saturation   


 


Ferritin    20.2


 


Total Bilirubin   


 


AST   


 


ALT   


 


Alkaline Phosphatase   


 


Lactate Dehydrogenase   


 


Total Creatine Kinase    84


 


Troponin I    0.02


 


NT-Pro-B Natriuret Pep   


 


Total Protein   


 


Albumin   


 


Globulin   


 


Albumin/Globulin Ratio   


 


Triglycerides    103


 


Cholesterol    103 L


 


LDL Cholesterol Direct    67


 


HDL Cholesterol    12 L


 


Vitamin B12    797


 


Folate    7.7


 


Free T4   


 


Thyroxine (T4)   


 


TSH 3rd Generation   


 


Arterial Blood Potassium   


 


Blood Type   A POSITIVE 


 


Antibody Screen   Negative 


 


Crossmatch   See Detail 


 


BBK History Checked   Patient has bt 














  02/05/19 02/05/19 02/05/19





  06:00 06:45 10:10


 


WBC   


 


RBC   


 


Hgb   


 


Hct   


 


MCV   


 


MCH   


 


MCHC   


 


RDW   


 


Plt Count   


 


MPV   


 


PT   


 


INR   


 


APTT   


 


pCO2   


 


pO2   


 


HCO3   


 


ABG pH   


 


ABG Total CO2   


 


ABG O2 Saturation   


 


ABG O2 Content   


 


ABG Base Excess   


 


ABG Hemoglobin   


 


ABG Carboxyhemoglobin   


 


POC ABG HHb (Measured)   


 


ABG Methemoglobin   


 


ABG O2 Capacity   


 


ABG Potassium   


 


Hgb O2 Saturation   


 


Glucose   


 


Lactate   


 


FiO2   


 


Inspiratory BiPAP   


 


Blood Gas Comments   


 


Crit Value Called To   


 


Crit Value Called By   


 


Blood Gas Notified Time   


 


Sodium   


 


Potassium   


 


Chloride   


 


Carbon Dioxide   


 


Anion Gap   


 


BUN   


 


Creatinine   


 


Est GFR ( Amer)   


 


Est GFR (Non-Af Amer)   


 


Random Glucose   


 


Hemoglobin A1c  6.2  


 


Lactic Acid   0.8 


 


Calcium   


 


Iron    27 L


 


TIBC    322


 


% Saturation    8 L


 


Ferritin   


 


Total Bilirubin   


 


AST   


 


ALT   


 


Alkaline Phosphatase   


 


Lactate Dehydrogenase   


 


Total Creatine Kinase   


 


Troponin I   


 


NT-Pro-B Natriuret Pep   


 


Total Protein   


 


Albumin   


 


Globulin   


 


Albumin/Globulin Ratio   


 


Triglycerides   


 


Cholesterol   


 


LDL Cholesterol Direct   


 


HDL Cholesterol   


 


Vitamin B12   


 


Folate   


 


Free T4   


 


Thyroxine (T4)   


 


TSH 3rd Generation   


 


Arterial Blood Potassium   


 


Blood Type   


 


Antibody Screen   


 


Crossmatch   


 


BBK History Checked   














  02/05/19 02/05/19 02/05/19





  10:10 10:10 11:25


 


WBC   


 


RBC   


 


Hgb   


 


Hct   


 


MCV   


 


MCH   


 


MCHC   


 


RDW   


 


Plt Count   


 


MPV   


 


PT   


 


INR   


 


APTT   


 


pCO2    95 H*


 


pO2    213.0 H


 


HCO3    34.7 H


 


ABG pH    7.17 L*


 


ABG Total CO2    37.6 H


 


ABG O2 Saturation    99.0 H


 


ABG O2 Content    12.0 L


 


ABG Base Excess    4.7 H


 


ABG Hemoglobin    8.5 L


 


ABG Carboxyhemoglobin    2.0 H


 


POC ABG HHb (Measured)    1.0


 


ABG Methemoglobin    0.6


 


ABG O2 Capacity    12.1 L


 


ABG Potassium   


 


Hgb O2 Saturation    96.3


 


Glucose   


 


Lactate   


 


FiO2    90.0


 


Inspiratory BiPAP   


 


Blood Gas Comments   


 


Crit Value Called To    williams Boucher


 


Crit Value Called By    lin Lino


 


Blood Gas Notified Time    1138


 


Sodium   


 


Potassium   


 


Chloride   


 


Carbon Dioxide   


 


Anion Gap   


 


BUN   


 


Creatinine   


 


Est GFR ( Amer)   


 


Est GFR (Non-Af Amer)   


 


Random Glucose   


 


Hemoglobin A1c   


 


Lactic Acid   


 


Calcium   


 


Iron   


 


TIBC   


 


% Saturation   


 


Ferritin   


 


Total Bilirubin   


 


AST   


 


ALT   


 


Alkaline Phosphatase   


 


Lactate Dehydrogenase   


 


Total Creatine Kinase  75  


 


Troponin I  0.02  


 


NT-Pro-B Natriuret Pep   


 


Total Protein   


 


Albumin   


 


Globulin   


 


Albumin/Globulin Ratio   


 


Triglycerides   


 


Cholesterol   


 


LDL Cholesterol Direct   


 


HDL Cholesterol   


 


Vitamin B12   


 


Folate   


 


Free T4   1.08 


 


Thyroxine (T4)   3.9 L 


 


TSH 3rd Generation   2.60 


 


Arterial Blood Potassium   


 


Blood Type   


 


Antibody Screen   


 


Crossmatch   


 


BBK History Checked   














  02/05/19





  15:10


 


WBC 


 


RBC 


 


Hgb 


 


Hct 


 


MCV 


 


MCH 


 


MCHC 


 


RDW 


 


Plt Count 


 


MPV 


 


PT 


 


INR 


 


APTT 


 


pCO2  76 H*


 


pO2  107.0 H


 


HCO3  32.6 H


 


ABG pH  7.24 L


 


ABG Total CO2  34.9 H


 


ABG O2 Saturation  98.5 H


 


ABG O2 Content 


 


ABG Base Excess  2.8


 


ABG Hemoglobin 


 


ABG Carboxyhemoglobin 


 


POC ABG HHb (Measured) 


 


ABG Methemoglobin 


 


ABG O2 Capacity 


 


ABG Potassium  4.2


 


Hgb O2 Saturation 


 


Glucose  90


 


Lactate  0.6 L


 


FiO2  40.0


 


Inspiratory BiPAP  22


 


Blood Gas Comments 


 


Crit Value Called To  narciso Boucher


 


Crit Value Called By  lin Lino


 


Blood Gas Notified Time  1314


 


Sodium  137.0


 


Potassium 


 


Chloride  103.0


 


Carbon Dioxide 


 


Anion Gap 


 


BUN 


 


Creatinine 


 


Est GFR ( Amer) 


 


Est GFR (Non-Af Amer) 


 


Random Glucose 


 


Hemoglobin A1c 


 


Lactic Acid 


 


Calcium 


 


Iron 


 


TIBC 


 


% Saturation 


 


Ferritin 


 


Total Bilirubin 


 


AST 


 


ALT 


 


Alkaline Phosphatase 


 


Lactate Dehydrogenase 


 


Total Creatine Kinase 


 


Troponin I 


 


NT-Pro-B Natriuret Pep 


 


Total Protein 


 


Albumin 


 


Globulin 


 


Albumin/Globulin Ratio 


 


Triglycerides 


 


Cholesterol 


 


LDL Cholesterol Direct 


 


HDL Cholesterol 


 


Vitamin B12 


 


Folate 


 


Free T4 


 


Thyroxine (T4) 


 


TSH 3rd Generation 


 


Arterial Blood Potassium  4.2


 


Blood Type 


 


Antibody Screen 


 


Crossmatch 


 


BBK History Checked 














Assessment & Plan





- Assessment and Plan (Free Text)


Plan: 





Infectious diseases Attending Physician Attestation


Patient seen and examined, discussed with medical resident. I have reviewed the 

patient's history of present illness, past medical, social, personal and family 

histories, pertinent physical exam findings, course so far in this hospital 

admission, pertinent laboratory and imaging results. I agree with the above 

findings, assessment and plan. In addition, will start Zyvox for patient with 

possible bilateral leg cellulitis. Doxycycline started by ICU team for COPD 

exacerbation. Will monitor clinically.

## 2019-02-05 NOTE — RAD
Date of service: 



02/05/2019



HISTORY:

 fever 



COMPARISON:

3/18/2016 



FINDINGS:



LUNGS:

No active pulmonary disease.



PLEURA:

No significant pleural effusion identified, no pneumothorax apparent.



CARDIOVASCULAR:

No aortic atherosclerotic calcification present.



Normal cardiac size. No pulmonary vascular congestion. 



OSSEOUS STRUCTURES:

No significant abnormalities.



VISUALIZED UPPER ABDOMEN:

Normal.



OTHER FINDINGS:

None.



IMPRESSION:

No active disease.

## 2019-02-05 NOTE — NM
Date of service: 



02/05/2019



COMPARISON:

Not available



TECHNIQUE:

39.0 mCi technetium 99-m  DTPA aerosol. 



6.0 mCI technetium 99-m MAA administered intravenously.



FINDINGS:

Please note that the examination is limited in that only anterior and 

posterior images could be obtained due to patient body habitus 



VENTILATION COMPONENT:

No significant ventilation defect.



PERFUSION COMPONENT:

No perfusion defect.



IMPRESSION:

Lowprobability ventilation perfusion scan for pulmonary embolism.

## 2019-02-05 NOTE — ED PDOC
Arrival/HPI





- General


Chief Complaint: Lower Extremity Problem/Injury


Time Seen by Provider: 02/05/19 02:18


Historian: Patient





- Critical Care


Critical Care Minutes: 30 minutes, 45 minutes





- History of Present Illness


Narrative History of Present Illness (Text): 





02/05/19 02:38


58 year old male, whose medical history includes morbid obesity and leg 

cellulitis, and denies any other past medical history, presents to the emergency

department with shortness of breath.  Patient also informs of dry skin changes 

to his right lower leg.  Patient informs he has not been to a PMD in quite some 

time.  Patient denies any chest pain, fever, chills, nausea, diarrhea, or any 

other complaints. 





Time/Duration: Prior to Arrival


Symptom Onset: Gradual


Symptom Course: Unchanged


Activities at Onset: Light


Context: Home





Past Medical History





- Provider Review


Nursing Documentation Reviewed: Yes





- Cardiac


Hx Cardiac Disorders: Yes


Hx Hypertension: Yes





- Pulmonary


Hx Respiratory Disorders: No





- Neurological


Hx Neurological Disorder: No





- HEENT


Hx HEENT Disorder: No





- Renal


Hx Renal Disorder: No





- Endocrine/Metabolic


Hx Endocrine Disorders: No





- Hematological/Oncological


Hx Blood Transfusions: Yes


Hx Blood Transfusion Reaction: No





- Integumentary


Hx Dermatological Disorder: No


Other/Comment: BILATERAL LYMPEDEMA WITH 2 LARGE OPENING ONE IS TO RIGHT LOWER 

LATERAL SIDE OF RIGHT LEG AND 2ND TO BACKSIDE OF THIGH.DRAINING COPIOUS 

SEROUSANGUINEOUS DRAINAGE,





- Musculoskeletal/Rheumatological


Hx Musculoskeletal Disorders: Yes





- Gastrointestinal


Hx Gastrointestinal Disorders: No





- Genitourinary/Gynecological


Hx Genitourinary Disorders: No





- Psychiatric


Hx Psychophysiologic Disorder: No


Hx Substance Use: No





- Surgical History


Hx Orthopedic Surgery: Yes (right ankle)





- Anesthesia


Hx Anesthesia Reactions: No


Hx Malignant Hyperthermia: No





Family/Social History





- Physician Review


Nursing Documentation Reviewed: Yes


Family/Social History: No Known Family HX


Smoking Status: Never Smoked


Hx Alcohol Use: No


Hx Substance Use: No





Allergies/Home Meds


Allergies/Adverse Reactions: 


Allergies





No Known Allergies Allergy (Verified 03/18/16 19:49)


   











Review of Systems





- Physician Review


All systems were reviewed & negative as marked: Yes





- Review of Systems


Constitutional: absent: Fevers, Night Sweats


Respiratory: SOB


Cardiovascular: absent: Chest Pain


Gastrointestinal: absent: Diarrhea, Nausea, Vomiting





Physical Exam


Vital Signs Reviewed: Yes





Vital Signs











  Temp Pulse Resp BP Pulse Ox


 


 02/05/19 01:48  97.5 F L  114 H  18  144/78  92 L











Temperature: Afebrile


Blood Pressure: Normal


Pulse: Tachycardic


Respiratory Rate: Normal


Appearance: Positive for: Well-Appearing, Non-Toxic, Comfortable


Pain Distress: None


Mental Status: Positive for: Alert and Oriented X 3





- Systems Exam


Head: Present: Atraumatic, Normocephalic


Pupils: Present: PERRL


Extroacular Muscles: Present: EOMI


Conjunctiva: Present: Normal


Mouth: Present: Moist Mucous Membranes


Neck: Present: Normal Range of Motion


Respiratory/Chest: Present: Clear to Auscultation, Good Air Exchange, Rhonchi 

(Scattered).  No: Respiratory Distress, Accessory Muscle Use


Cardiovascular: Present: Normal S1, S2, Irregular Rhythm.  No: Murmurs


Abdomen: Present: Normal Bowel Sounds.  No: Tenderness, Distention, Peritoneal 

Signs


Back: Present: Normal Inspection


Upper Extremity: Present: Normal Inspection.  No: Cyanosis, Edema


Lower Extremity: Present: Neurovascularly Intact.  No: Normal Inspection 

(Lymphedema bilaterally, with chronic venous stasis changes; dry skin), Any's 

Sign


Neurological: Present: GCS=15, CN II-XII Intact, Speech Normal, Motor Func 

Grossly Intact, Normal Sensory Function


Skin: Present: Warm, Dry, Normal Color.  No: Rashes


Psychiatric: Present: Alert, Oriented x 3, Normal Insight, Normal Concentration





Medical Decision Making


ED Course and Treatment: 





02/05/19 02:49


Impression: 58 year old male presents with shortness of breath, skin changes to 

legs





Plan:


-- EKG


-- CMP, BNP, Cardiac iso


-- CBC, Platelets


-- Chest X-ray


-- Duoneb


-- US Lower EXT


-- Reassess and disposition





Prior Visits:


Notes and results from previous visits were reviewed





Progress Notes:


02/05/19 05:08


US reviewed, shows:


Negative DVT, but limited study.





02/05/19 05:15


EKG Reviewed by me, shows:


A-fib @ 110bpm


Inferior and anterior infarct





02/05/19 05:15


Chest X-ray Reviewed by me, shows:


CHF





02/05/19 05:38


Case discussed with medical resident and Dr Avila, who accept 

admission.Patient will need to go to the ICU.Case was discussed with  

who accepted to his service and agrees with ICU/CCU admission.








- RAD Interpretation


Radiology Orders: 











02/05/19 02:29


CHEST PORTABLE [RAD] Stat 


DUPLEX LOWER EXTRM VEIN BILAT [US] Stat 














- Medication Orders


Current Medication Orders: 











Albuterol/Ipratropium (Duoneb 3 Mg/0.5 Mg (3 Ml) Ud)  3 ml IH ONCE STA


   Stop: 02/05/19 02:32











- Scribe Statement


The provider has reviewed the documentation as recorded by the [facility]nils Loo





Provider Scribe Attestation:


All medical record entries made by the Scribe were at my direction and 

personally dictated by me. I have reviewed the chart and agree that the record 

accurately reflects my personal performance of the history, physical exam, 

medical decision making, and the department course for this patient. I have also

 personally directed, reviewed, and agree with the discharge instructions and 

disposition.











Disposition/Present on Arrival





- Present on Arrival


Any Indicators Present on Arrival: No


History of DVT/PE: No


History of Uncontrolled Diabetes: No


Urinary Catheter: No


History of Decub. Ulcer: No


History Surgical Site Infection Following: None





- Disposition


Have Diagnosis and Disposition been Completed?: Yes


Diagnosis: 


 CHF (congestive heart failure), Anemia, Lymphedema, Atrial fibrillation





Disposition: HOSPITALIZED


Disposition Time: 05:42


Patient Problems: 


                             Current Active Problems











Problem Status Onset


 


Anemia Acute 


 


Atrial fibrillation Acute 


 


CHF (congestive heart failure) Acute 


 


Lymphedema Acute 











Condition: GUARDED

## 2019-02-05 NOTE — CP.PCM.CON
<Adam Ceballos - Last Filed: 02/05/19 15:18>





History of Present Illness





- History of Present Illness


History of Present Illness: 


Podiatry consult note for Dr. Huffman





59 y/o male with PMHx morbid obesity, leg cellulitis, and chronic lymphedema, 

seen and evalauted in the ICU due to right lateral leg wound. Reason for ICU 

consult was for worsening respiratory distress, possible new-onset arrhythmia. 

Patient unable to converse at this time due to increased shortness of breath.  

Denies fever, chills, chest pain, n/v/d/c, abd pain, urinary complaints, or 

other symptoms. 





PMhx: morbid obesity, leg cellulitis, and chronic lymphedema


PSurgHx: surgery for R ankle fracture in 2006, I+D of RLE for cellulitis in 2016


Allergies: NKDA


Home meds: none


Fam hx: noncontributory


Soc hx: denies smoking, EtOH or illicit drug use





PMD: none








Review of Systems





- Review of Systems


All systems: reviewed and no additional remarkable complaints except


Review of Systems: 





As per HPI





Past Patient History





- Past Social History


Smoking Status: Never Smoked





- CARDIAC


Hx Cardiac Disorders: Yes


Hx Hypertension: Yes





- PULMONARY


Hx Respiratory Disorders: No





- NEUROLOGICAL


Hx Neurological Disorder: No





- HEENT


Hx HEENT Problems: No





- RENAL


Hx Chronic Kidney Disease: No





- ENDOCRINE/METABOLIC


Hx Endocrine Disorders: No





- HEMATOLOGICAL/ONCOLOGICAL


Hx Blood Transfusions: Yes


Hx Blood Transfusion Reaction: No





- INTEGUMENTARY


Hx Dermatological Problems: No


Other/Comment: BILATERAL LYMPEDEMA WITH 2 LARGE OPENING ONE IS TO RIGHT LOWER 

LATERAL SIDE OF RIGHT LEG AND 2ND TO BACKSIDE OF THIGH.DRAINING COPIOUS 

SEROUSANGUINEOUS DRAINAGE,





- MUSCULOSKELETAL/RHEUMATOLOGICAL


Hx Musculoskeletal Disorders: Yes





- GASTROINTESTINAL


Hx Gastrointestinal Disorders: No





- GENITOURINARY/GYNECOLOGICAL


Hx Genitourinary Disorders: No





- PSYCHIATRIC


Hx Psychophysiologic Disorder: No


Hx Substance Use: No





- SURGICAL HISTORY


Hx Orthopedic Surgery: Yes (right ankle)





- ANESTHESIA


Hx Anesthesia Reactions: No


Hx Malignant Hyperthermia: No





Meds


Allergies/Adverse Reactions: 


                                    Allergies











Allergy/AdvReac Type Severity Reaction Status Date / Time


 


No Known Allergies Allergy   Verified 03/18/16 19:49














- Medications


Medications: 


                               Current Medications





Acetaminophen (Tylenol 325mg Tab)  650 mg PO Q6 PRN


   PRN Reason: TEMP>=99.5F


Acetaminophen (Tylenol 650 Mg Supp)  650 mg RC Q6H PRN


   PRN Reason: TEMP>=99.5F


Albuterol/Ipratropium (Duoneb 3 Mg/0.5 Mg (3 Ml) Ud)  3 ml IH Q4H [facility]


Atorvastatin Calcium (Lipitor)  40 mg PO DIN [facility]


Docusate Sodium (Colace)  100 mg PO TID [facility]


   Last Admin: 02/05/19 14:29 Dose:  Not Given


Furosemide (Lasix)  40 mg IVP Q8H [facility]


   Last Admin: 02/05/19 14:32 Dose:  40 mg


diltiaZEM IVPB 100mg in NS (Cardizem 100mg In Ns)  100 mls @ 5 mls/hr IV .Q20H 

PRN; Protocol


   PRN Reason: TITRATE PER MD ORDER


   Last Admin: 02/05/19 03:25 Dose:  5 mls/hr


Linezolid (Zyvox 600mg/300ml D5w)  600 mg in 300 mls @ 200 mls/hr IVPB Q12 TREV; 

Protocol


   Stop: 02/12/19 22:01


Doxycycline Hyclate 100 mg/ (Sodium Chloride)  100 mls @ 100 mls/hr IVPB Q12 

TREV; Protocol


   Last Admin: 02/05/19 14:30 Dose:  100 mls/hr


Heparin Sodium/Sodium Chloride (Heparin 89661 Units/250ml 1/2 Normal Saline)  

25,000 units in 250 mls @ 20.02 mls/hr IV .[id number] PRN; Protocol


   PRN Reason: ADJUST RATE PER PROTOCOL


   Last Admin: 02/05/19 14:33 Dose:  8.25 units/kg/hr, 20.02 mls/hr


Methylprednisolone (Solu-Medrol)  20 mg IVP Q8H [facility]


   Last Admin: 02/05/19 14:32 Dose:  20 mg


Ondansetron HCl (Zofran Inj)  4 mg IVP Q4H PRN


   PRN Reason: Nausea/Vomiting


Pantoprazole Sodium (Protonix Ec Tab)  40 mg PO 0600 [facility]











Physical Exam





- Constitutional


Appears: Well, Non-toxic, In Acute Distress





- Head Exam


Head Exam: ATRAUMATIC, NORMOCEPHALIC





- Extremities Exam


Additional comments: 


Bilateral Lower Extremity Exam





VASC: DP and PT non-palpable secondary to significant lymphedema, TG within 

normal limits





DERM





RIGHT: superficial wounds noted to the lateral aspect of the right leg and to 

the posterior thigh, 100% granular base, no probe to bone, no malodor, minimal 

drainage, no signs of infection noted





LEFT: small superficial wound noted to the medial aspect of the left leg, fibro-

granular base, no probe to bone, no malodor, minimal drainage, no signs of 

infection noted, significant lymphedema noted bilaterally, 





NEURO: unable to assess, patient responding to pain stimuli





ORTHO: pain with movement and range of motion, pain on palpation to wounds








- Neurological Exam


Neurological exam: Alert





- Psychiatric Exam


Psychiatric exam: Normal Affect, Normal Mood





Results





- Vital Signs


Recent Vital Signs: 


                                Last Vital Signs











Temp  97.5 F L  02/05/19 14:25


 


Pulse  73   02/05/19 14:25


 


Resp  28 H  02/05/19 14:25


 


BP  146/74   02/05/19 14:32


 


Pulse Ox  99   02/05/19 10:00














- Labs


Result Diagrams: 


                                 02/05/19 03:10





                                 02/05/19 03:10


Labs: 


                         Laboratory Results - last 24 hr











  02/05/19 02/05/19 02/05/19





  03:10 03:10 03:10


 


WBC   9.2 


 


RBC   4.40 


 


Hgb   8.4 L 


 


Hct   32.0 L 


 


MCV   72.7 L 


 


MCH   19.1 L 


 


MCHC   26.3 L 


 


RDW   20.2 H 


 


Plt Count   308 


 


MPV   9.8 


 


PT    18.5 H


 


INR    1.67


 


APTT    30.0


 


pCO2   


 


pO2   


 


HCO3   


 


ABG pH   


 


ABG Total CO2   


 


ABG O2 Saturation   


 


ABG O2 Content   


 


ABG Base Excess   


 


ABG Hemoglobin   


 


ABG Carboxyhemoglobin   


 


POC ABG HHb (Measured)   


 


ABG Methemoglobin   


 


ABG O2 Capacity   


 


ABG Potassium   


 


Hgb O2 Saturation   


 


Glucose   


 


Lactate   


 


FiO2   


 


Inspiratory BiPAP   


 


Blood Gas Comments   


 


Crit Value Called To   


 


Crit Value Called By   


 


Blood Gas Notified Time   


 


Sodium  134  


 


Potassium  4.8  


 


Chloride  96 L  


 


Carbon Dioxide  32  


 


Anion Gap  10  


 


BUN  30 H  


 


Creatinine  1.0  


 


Est GFR ( Amer)  > 60  


 


Est GFR (Non-Af Amer)  > 60  


 


Random Glucose  117 H  


 


Hemoglobin A1c   


 


Lactic Acid   


 


Calcium  8.1 L  


 


Iron   


 


TIBC   


 


% Saturation   


 


Ferritin   


 


Total Bilirubin  0.9  


 


AST  62 H  


 


ALT  18  


 


Alkaline Phosphatase  40  


 


Lactate Dehydrogenase  598  


 


Total Creatine Kinase  91  


 


Troponin I  0.02  D  


 


NT-Pro-B Natriuret Pep  2910 H  


 


Total Protein  8.1  


 


Albumin  3.7  


 


Globulin  4.5  


 


Albumin/Globulin Ratio  0.8 L  


 


Triglycerides   


 


Cholesterol   


 


LDL Cholesterol Direct   


 


HDL Cholesterol   


 


Vitamin B12   


 


Folate   


 


Free T4   


 


Thyroxine (T4)   


 


TSH 3rd Generation   


 


Arterial Blood Potassium   


 


Blood Type   


 


Antibody Screen   


 


Crossmatch   


 


BBK History Checked   














  02/05/19 02/05/19 02/05/19





  05:35 06:00 06:00


 


WBC   


 


RBC   


 


Hgb   


 


Hct   


 


MCV   


 


MCH   


 


MCHC   


 


RDW   


 


Plt Count   


 


MPV   


 


PT   


 


INR   


 


APTT   


 


pCO2  83 H*  


 


pO2  162.0 H  


 


HCO3  30.3 H  


 


ABG pH  7.17 L*  


 


ABG Total CO2  32.8 H  


 


ABG O2 Saturation  99.4 H  


 


ABG O2 Content  11.8 L  


 


ABG Base Excess  0.9  


 


ABG Hemoglobin  8.4 L  


 


ABG Carboxyhemoglobin  2.5 H  


 


POC ABG HHb (Measured)  0.6  


 


ABG Methemoglobin  0.4  


 


ABG O2 Capacity  11.9 L  


 


ABG Potassium   


 


Hgb O2 Saturation  96.4  


 


Glucose   


 


Lactate   


 


FiO2  100.0  


 


Inspiratory BiPAP   


 


Blood Gas Comments  Ms prisca viramontes(rrt) will bring abg results to ed  


 


Crit Value Called To  Reji aguilar rn ed  


 


Crit Value Called By  Jsm  


 


Blood Gas Notified Time  554  


 


Sodium   


 


Potassium   


 


Chloride   


 


Carbon Dioxide   


 


Anion Gap   


 


BUN   


 


Creatinine   


 


Est GFR ( Amer)   


 


Est GFR (Non-Af Amer)   


 


Random Glucose   


 


Hemoglobin A1c   


 


Lactic Acid   


 


Calcium   


 


Iron   


 


TIBC   


 


% Saturation   


 


Ferritin    20.2


 


Total Bilirubin   


 


AST   


 


ALT   


 


Alkaline Phosphatase   


 


Lactate Dehydrogenase   


 


Total Creatine Kinase    84


 


Troponin I    0.02


 


NT-Pro-B Natriuret Pep   


 


Total Protein   


 


Albumin   


 


Globulin   


 


Albumin/Globulin Ratio   


 


Triglycerides    103


 


Cholesterol    103 L


 


LDL Cholesterol Direct    67


 


HDL Cholesterol    12 L


 


Vitamin B12    797


 


Folate    7.7


 


Free T4   


 


Thyroxine (T4)   


 


TSH 3rd Generation   


 


Arterial Blood Potassium   


 


Blood Type   A POSITIVE 


 


Antibody Screen   Negative 


 


Crossmatch   See Detail 


 


BBK History Checked   Patient has bt 














  02/05/19 02/05/19 02/05/19





  06:00 06:45 10:10


 


WBC   


 


RBC   


 


Hgb   


 


Hct   


 


MCV   


 


MCH   


 


MCHC   


 


RDW   


 


Plt Count   


 


MPV   


 


PT   


 


INR   


 


APTT   


 


pCO2   


 


pO2   


 


HCO3   


 


ABG pH   


 


ABG Total CO2   


 


ABG O2 Saturation   


 


ABG O2 Content   


 


ABG Base Excess   


 


ABG Hemoglobin   


 


ABG Carboxyhemoglobin   


 


POC ABG HHb (Measured)   


 


ABG Methemoglobin   


 


ABG O2 Capacity   


 


ABG Potassium   


 


Hgb O2 Saturation   


 


Glucose   


 


Lactate   


 


FiO2   


 


Inspiratory BiPAP   


 


Blood Gas Comments   


 


Crit Value Called To   


 


Crit Value Called By   


 


Blood Gas Notified Time   


 


Sodium   


 


Potassium   


 


Chloride   


 


Carbon Dioxide   


 


Anion Gap   


 


BUN   


 


Creatinine   


 


Est GFR ( Amer)   


 


Est GFR (Non-Af Amer)   


 


Random Glucose   


 


Hemoglobin A1c  6.2  


 


Lactic Acid   0.8 


 


Calcium   


 


Iron    27 L


 


TIBC    322


 


% Saturation    8 L


 


Ferritin   


 


Total Bilirubin   


 


AST   


 


ALT   


 


Alkaline Phosphatase   


 


Lactate Dehydrogenase   


 


Total Creatine Kinase   


 


Troponin I   


 


NT-Pro-B Natriuret Pep   


 


Total Protein   


 


Albumin   


 


Globulin   


 


Albumin/Globulin Ratio   


 


Triglycerides   


 


Cholesterol   


 


LDL Cholesterol Direct   


 


HDL Cholesterol   


 


Vitamin B12   


 


Folate   


 


Free T4   


 


Thyroxine (T4)   


 


TSH 3rd Generation   


 


Arterial Blood Potassium   


 


Blood Type   


 


Antibody Screen   


 


Crossmatch   


 


BBK History Checked   














  02/05/19 02/05/19 02/05/19





  10:10 10:10 11:25


 


WBC   


 


RBC   


 


Hgb   


 


Hct   


 


MCV   


 


MCH   


 


MCHC   


 


RDW   


 


Plt Count   


 


MPV   


 


PT   


 


INR   


 


APTT   


 


pCO2    95 H*


 


pO2    213.0 H


 


HCO3    34.7 H


 


ABG pH    7.17 L*


 


ABG Total CO2    37.6 H


 


ABG O2 Saturation    99.0 H


 


ABG O2 Content    12.0 L


 


ABG Base Excess    4.7 H


 


ABG Hemoglobin    8.5 L


 


ABG Carboxyhemoglobin    2.0 H


 


POC ABG HHb (Measured)    1.0


 


ABG Methemoglobin    0.6


 


ABG O2 Capacity    12.1 L


 


ABG Potassium   


 


Hgb O2 Saturation    96.3


 


Glucose   


 


Lactate   


 


FiO2    90.0


 


Inspiratory BiPAP   


 


Blood Gas Comments   


 


Crit Value Called To    williams Boucher


 


Crit Value Called By    lin Lino


 


Blood Gas Notified Time    1138


 


Sodium   


 


Potassium   


 


Chloride   


 


Carbon Dioxide   


 


Anion Gap   


 


BUN   


 


Creatinine   


 


Est GFR ( Amer)   


 


Est GFR (Non-Af Amer)   


 


Random Glucose   


 


Hemoglobin A1c   


 


Lactic Acid   


 


Calcium   


 


Iron   


 


TIBC   


 


% Saturation   


 


Ferritin   


 


Total Bilirubin   


 


AST   


 


ALT   


 


Alkaline Phosphatase   


 


Lactate Dehydrogenase   


 


Total Creatine Kinase  75  


 


Troponin I  0.02  


 


NT-Pro-B Natriuret Pep   


 


Total Protein   


 


Albumin   


 


Globulin   


 


Albumin/Globulin Ratio   


 


Triglycerides   


 


Cholesterol   


 


LDL Cholesterol Direct   


 


HDL Cholesterol   


 


Vitamin B12   


 


Folate   


 


Free T4   1.08 


 


Thyroxine (T4)   3.9 L 


 


TSH 3rd Generation   2.60 


 


Arterial Blood Potassium   


 


Blood Type   


 


Antibody Screen   


 


Crossmatch   


 


BBK History Checked   














  02/05/19





  15:10


 


WBC 


 


RBC 


 


Hgb 


 


Hct 


 


MCV 


 


MCH 


 


MCHC 


 


RDW 


 


Plt Count 


 


MPV 


 


PT 


 


INR 


 


APTT 


 


pCO2  76 H*


 


pO2  107.0 H


 


HCO3  32.6 H


 


ABG pH  7.24 L


 


ABG Total CO2  34.9 H


 


ABG O2 Saturation  98.5 H


 


ABG O2 Content 


 


ABG Base Excess  2.8


 


ABG Hemoglobin 


 


ABG Carboxyhemoglobin 


 


POC ABG HHb (Measured) 


 


ABG Methemoglobin 


 


ABG O2 Capacity 


 


ABG Potassium  4.2


 


Hgb O2 Saturation 


 


Glucose  90


 


Lactate  0.6 L


 


FiO2  40.0


 


Inspiratory BiPAP  22


 


Blood Gas Comments 


 


Crit Value Called To  narciso Boucher


 


Crit Value Called By  lin Lino


 


Blood Gas Notified Time  1314


 


Sodium  137.0


 


Potassium 


 


Chloride  103.0


 


Carbon Dioxide 


 


Anion Gap 


 


BUN 


 


Creatinine 


 


Est GFR ( Amer) 


 


Est GFR (Non-Af Amer) 


 


Random Glucose 


 


Hemoglobin A1c 


 


Lactic Acid 


 


Calcium 


 


Iron 


 


TIBC 


 


% Saturation 


 


Ferritin 


 


Total Bilirubin 


 


AST 


 


ALT 


 


Alkaline Phosphatase 


 


Lactate Dehydrogenase 


 


Total Creatine Kinase 


 


Troponin I 


 


NT-Pro-B Natriuret Pep 


 


Total Protein 


 


Albumin 


 


Globulin 


 


Albumin/Globulin Ratio 


 


Triglycerides 


 


Cholesterol 


 


LDL Cholesterol Direct 


 


HDL Cholesterol 


 


Vitamin B12 


 


Folate 


 


Free T4 


 


Thyroxine (T4) 


 


TSH 3rd Generation 


 


Arterial Blood Potassium  4.2


 


Blood Type 


 


Antibody Screen 


 


Crossmatch 


 


BBK History Checked 














Assessment & Plan





- Assessment and Plan (Free Text)


Assessment: 


59 y/o male patient seen with superficial wounds to bilateral lower extremity, 

non-infected





Plan: 


Patient seen and evaluated with Dr. Huffman


Plan discussed with attending


Chart, labs and vitals were reviewed, afebrile, absent leukocytosis


Wound cleansed with saline and dressed with maxorb, optifoam, and ACE


Patient stable from podiatry standpoint, no intervention required


Podiatry will continue to follow





- Date & Time


Date: 02/05/19


Time: 15:27





<Grzegorz Huffman - Last Filed: 02/05/19 16:19>





Meds





- Medications


Medications: 


                               Current Medications





Acetaminophen (Tylenol 325mg Tab)  650 mg PO Q6 PRN


   PRN Reason: TEMP>=99.5F


Acetaminophen (Tylenol 650 Mg Supp)  650 mg RC Q6H PRN


   PRN Reason: TEMP>=99.5F


Albuterol/Ipratropium (Duoneb 3 Mg/0.5 Mg (3 Ml) Ud)  3 ml IH Q4H [facility]


Atorvastatin Calcium (Lipitor)  40 mg PO DIN [facility]


Docusate Sodium (Colace)  100 mg PO TID [facility]


   Last Admin: 02/05/19 14:29 Dose:  Not Given


Furosemide (Lasix)  40 mg IVP Q8H [facility]


   Last Admin: 02/05/19 14:32 Dose:  40 mg


diltiaZEM IVPB 100mg in NS (Cardizem 100mg In Ns)  100 mls @ 5 mls/hr IV .Q20H 

PRN; Protocol


   PRN Reason: TITRATE PER MD ORDER


   Last Admin: 02/05/19 03:25 Dose:  5 mls/hr


Linezolid (Zyvox 600mg/300ml D5w)  600 mg in 300 mls @ 200 mls/hr IVPB Q12 [facility]; 

Protocol


   Stop: 02/12/19 22:01


Doxycycline Hyclate 100 mg/ (Sodium Chloride)  100 mls @ 100 mls/hr IVPB Q12 

TREV; Protocol


   Last Admin: 02/05/19 14:30 Dose:  100 mls/hr


Heparin Sodium/Sodium Chloride (Heparin 77646 Units/250ml 1/2 Normal Saline)  

25,000 units in 250 mls @ 20.02 mls/hr IV .[id number] PRN; Protocol


   PRN Reason: ADJUST RATE PER PROTOCOL


   Last Admin: 02/05/19 14:33 Dose:  8.25 units/kg/hr, 20.02 mls/hr


Methylprednisolone (Solu-Medrol)  20 mg IVP Q8H [facility]


   Last Admin: 02/05/19 14:32 Dose:  20 mg


Ondansetron HCl (Zofran Inj)  4 mg IVP Q4H PRN


   PRN Reason: Nausea/Vomiting


Pantoprazole Sodium (Protonix Ec Tab)  40 mg PO 0600 [facility]











Results





- Vital Signs


Recent Vital Signs: 


                                Last Vital Signs











Temp  97.9 F   02/05/19 16:00


 


Pulse  73   02/05/19 14:25


 


Resp  28 H  02/05/19 14:25


 


BP  146/74   02/05/19 14:32


 


Pulse Ox  99   02/05/19 10:00














- Labs


Result Diagrams: 


                                 02/05/19 03:10





                                 02/05/19 03:10


Labs: 


                         Laboratory Results - last 24 hr











  02/05/19 02/05/19 02/05/19





  03:10 03:10 03:10


 


WBC   9.2 


 


RBC   4.40 


 


Hgb   8.4 L 


 


Hct   32.0 L 


 


MCV   72.7 L 


 


MCH   19.1 L 


 


MCHC   26.3 L 


 


RDW   20.2 H 


 


Plt Count   308 


 


MPV   9.8 


 


PT    18.5 H


 


INR    1.67


 


APTT    30.0


 


pCO2   


 


pO2   


 


HCO3   


 


ABG pH   


 


ABG Total CO2   


 


ABG O2 Saturation   


 


ABG O2 Content   


 


ABG Base Excess   


 


ABG Hemoglobin   


 


ABG Carboxyhemoglobin   


 


POC ABG HHb (Measured)   


 


ABG Methemoglobin   


 


ABG O2 Capacity   


 


ABG Potassium   


 


Hgb O2 Saturation   


 


Glucose   


 


Lactate   


 


FiO2   


 


Inspiratory BiPAP   


 


Blood Gas Comments   


 


Crit Value Called To   


 


Crit Value Called By   


 


Blood Gas Notified Time   


 


Sodium  134  


 


Potassium  4.8  


 


Chloride  96 L  


 


Carbon Dioxide  32  


 


Anion Gap  10  


 


BUN  30 H  


 


Creatinine  1.0  


 


Est GFR ( Amer)  > 60  


 


Est GFR (Non-Af Amer)  > 60  


 


Random Glucose  117 H  


 


Hemoglobin A1c   


 


Lactic Acid   


 


Calcium  8.1 L  


 


Iron   


 


TIBC   


 


% Saturation   


 


Ferritin   


 


Total Bilirubin  0.9  


 


AST  62 H  


 


ALT  18  


 


Alkaline Phosphatase  40  


 


Lactate Dehydrogenase  598  


 


Total Creatine Kinase  91  


 


Troponin I  0.02  D  


 


NT-Pro-B Natriuret Pep  2910 H  


 


Total Protein  8.1  


 


Albumin  3.7  


 


Globulin  4.5  


 


Albumin/Globulin Ratio  0.8 L  


 


Triglycerides   


 


Cholesterol   


 


LDL Cholesterol Direct   


 


HDL Cholesterol   


 


Vitamin B12   


 


Folate   


 


Free T4   


 


Thyroxine (T4)   


 


TSH 3rd Generation   


 


Arterial Blood Potassium   


 


Blood Type   


 


Antibody Screen   


 


Crossmatch   


 


BBK History Checked   














  02/05/19 02/05/19 02/05/19





  05:35 06:00 06:00


 


WBC   


 


RBC   


 


Hgb   


 


Hct   


 


MCV   


 


MCH   


 


MCHC   


 


RDW   


 


Plt Count   


 


MPV   


 


PT   


 


INR   


 


APTT   


 


pCO2  83 H*  


 


pO2  162.0 H  


 


HCO3  30.3 H  


 


ABG pH  7.17 L*  


 


ABG Total CO2  32.8 H  


 


ABG O2 Saturation  99.4 H  


 


ABG O2 Content  11.8 L  


 


ABG Base Excess  0.9  


 


ABG Hemoglobin  8.4 L  


 


ABG Carboxyhemoglobin  2.5 H  


 


POC ABG HHb (Measured)  0.6  


 


ABG Methemoglobin  0.4  


 


ABG O2 Capacity  11.9 L  


 


ABG Potassium   


 


Hgb O2 Saturation  96.4  


 


Glucose   


 


Lactate   


 


FiO2  100.0  


 


Inspiratory BiPAP   


 


Blood Gas Comments  Ms prisca viramontes(rrt) will bring abg results to ed  


 


Crit Value Called To  Reji aguilar rn ed  


 


Crit Value Called By  Jsm  


 


Blood Gas Notified Time  554  


 


Sodium   


 


Potassium   


 


Chloride   


 


Carbon Dioxide   


 


Anion Gap   


 


BUN   


 


Creatinine   


 


Est GFR ( Amer)   


 


Est GFR (Non-Af Amer)   


 


Random Glucose   


 


Hemoglobin A1c   


 


Lactic Acid   


 


Calcium   


 


Iron   


 


TIBC   


 


% Saturation   


 


Ferritin    20.2


 


Total Bilirubin   


 


AST   


 


ALT   


 


Alkaline Phosphatase   


 


Lactate Dehydrogenase   


 


Total Creatine Kinase    84


 


Troponin I    0.02


 


NT-Pro-B Natriuret Pep   


 


Total Protein   


 


Albumin   


 


Globulin   


 


Albumin/Globulin Ratio   


 


Triglycerides    103


 


Cholesterol    103 L


 


LDL Cholesterol Direct    67


 


HDL Cholesterol    12 L


 


Vitamin B12    797


 


Folate    7.7


 


Free T4   


 


Thyroxine (T4)   


 


TSH 3rd Generation   


 


Arterial Blood Potassium   


 


Blood Type   A POSITIVE 


 


Antibody Screen   Negative 


 


Crossmatch   See Detail 


 


BBK History Checked   Patient has bt 














  02/05/19 02/05/19 02/05/19





  06:00 06:45 10:10


 


WBC   


 


RBC   


 


Hgb   


 


Hct   


 


MCV   


 


MCH   


 


MCHC   


 


RDW   


 


Plt Count   


 


MPV   


 


PT   


 


INR   


 


APTT   


 


pCO2   


 


pO2   


 


HCO3   


 


ABG pH   


 


ABG Total CO2   


 


ABG O2 Saturation   


 


ABG O2 Content   


 


ABG Base Excess   


 


ABG Hemoglobin   


 


ABG Carboxyhemoglobin   


 


POC ABG HHb (Measured)   


 


ABG Methemoglobin   


 


ABG O2 Capacity   


 


ABG Potassium   


 


Hgb O2 Saturation   


 


Glucose   


 


Lactate   


 


FiO2   


 


Inspiratory BiPAP   


 


Blood Gas Comments   


 


Crit Value Called To   


 


Crit Value Called By   


 


Blood Gas Notified Time   


 


Sodium   


 


Potassium   


 


Chloride   


 


Carbon Dioxide   


 


Anion Gap   


 


BUN   


 


Creatinine   


 


Est GFR ( Amer)   


 


Est GFR (Non-Af Amer)   


 


Random Glucose   


 


Hemoglobin A1c  6.2  


 


Lactic Acid   0.8 


 


Calcium   


 


Iron    27 L


 


TIBC    322


 


% Saturation    8 L


 


Ferritin   


 


Total Bilirubin   


 


AST   


 


ALT   


 


Alkaline Phosphatase   


 


Lactate Dehydrogenase   


 


Total Creatine Kinase   


 


Troponin I   


 


NT-Pro-B Natriuret Pep   


 


Total Protein   


 


Albumin   


 


Globulin   


 


Albumin/Globulin Ratio   


 


Triglycerides   


 


Cholesterol   


 


LDL Cholesterol Direct   


 


HDL Cholesterol   


 


Vitamin B12   


 


Folate   


 


Free T4   


 


Thyroxine (T4)   


 


TSH 3rd Generation   


 


Arterial Blood Potassium   


 


Blood Type   


 


Antibody Screen   


 


Crossmatch   


 


BBK History Checked   














  02/05/19 02/05/19 02/05/19





  10:10 10:10 11:25


 


WBC   


 


RBC   


 


Hgb   


 


Hct   


 


MCV   


 


MCH   


 


MCHC   


 


RDW   


 


Plt Count   


 


MPV   


 


PT   


 


INR   


 


APTT   


 


pCO2    95 H*


 


pO2    213.0 H


 


HCO3    34.7 H


 


ABG pH    7.17 L*


 


ABG Total CO2    37.6 H


 


ABG O2 Saturation    99.0 H


 


ABG O2 Content    12.0 L


 


ABG Base Excess    4.7 H


 


ABG Hemoglobin    8.5 L


 


ABG Carboxyhemoglobin    2.0 H


 


POC ABG HHb (Measured)    1.0


 


ABG Methemoglobin    0.6


 


ABG O2 Capacity    12.1 L


 


ABG Potassium   


 


Hgb O2 Saturation    96.3


 


Glucose   


 


Lactate   


 


FiO2    90.0


 


Inspiratory BiPAP   


 


Blood Gas Comments   


 


Crit Value Called To    williams Boucher


 


Crit Value Called By    lin Lino


 


Blood Gas Notified Time    1138


 


Sodium   


 


Potassium   


 


Chloride   


 


Carbon Dioxide   


 


Anion Gap   


 


BUN   


 


Creatinine   


 


Est GFR ( Amer)   


 


Est GFR (Non-Af Amer)   


 


Random Glucose   


 


Hemoglobin A1c   


 


Lactic Acid   


 


Calcium   


 


Iron   


 


TIBC   


 


% Saturation   


 


Ferritin   


 


Total Bilirubin   


 


AST   


 


ALT   


 


Alkaline Phosphatase   


 


Lactate Dehydrogenase   


 


Total Creatine Kinase  75  


 


Troponin I  0.02  


 


NT-Pro-B Natriuret Pep   


 


Total Protein   


 


Albumin   


 


Globulin   


 


Albumin/Globulin Ratio   


 


Triglycerides   


 


Cholesterol   


 


LDL Cholesterol Direct   


 


HDL Cholesterol   


 


Vitamin B12   


 


Folate   


 


Free T4   1.08 


 


Thyroxine (T4)   3.9 L 


 


TSH 3rd Generation   2.60 


 


Arterial Blood Potassium   


 


Blood Type   


 


Antibody Screen   


 


Crossmatch   


 


BBK History Checked   














  02/05/19





  15:10


 


WBC 


 


RBC 


 


Hgb 


 


Hct 


 


MCV 


 


MCH 


 


MCHC 


 


RDW 


 


Plt Count 


 


MPV 


 


PT 


 


INR 


 


APTT 


 


pCO2  76 H*


 


pO2  107.0 H


 


HCO3  32.6 H


 


ABG pH  7.24 L


 


ABG Total CO2  34.9 H


 


ABG O2 Saturation  98.5 H


 


ABG O2 Content 


 


ABG Base Excess  2.8


 


ABG Hemoglobin 


 


ABG Carboxyhemoglobin 


 


POC ABG HHb (Measured) 


 


ABG Methemoglobin 


 


ABG O2 Capacity 


 


ABG Potassium  4.2


 


Hgb O2 Saturation 


 


Glucose  90


 


Lactate  0.6 L


 


FiO2  40.0


 


Inspiratory BiPAP  22


 


Blood Gas Comments 


 


Crit Value Called To  narciso Boucher


 


Crit Value Called By  lin Lino


 


Blood Gas Notified Time  1314


 


Sodium  137.0


 


Potassium 


 


Chloride  103.0


 


Carbon Dioxide 


 


Anion Gap 


 


BUN 


 


Creatinine 


 


Est GFR ( Amer) 


 


Est GFR (Non-Af Amer) 


 


Random Glucose 


 


Hemoglobin A1c 


 


Lactic Acid 


 


Calcium 


 


Iron 


 


TIBC 


 


% Saturation 


 


Ferritin 


 


Total Bilirubin 


 


AST 


 


ALT 


 


Alkaline Phosphatase 


 


Lactate Dehydrogenase 


 


Total Creatine Kinase 


 


Troponin I 


 


NT-Pro-B Natriuret Pep 


 


Total Protein 


 


Albumin 


 


Globulin 


 


Albumin/Globulin Ratio 


 


Triglycerides 


 


Cholesterol 


 


LDL Cholesterol Direct 


 


HDL Cholesterol 


 


Vitamin B12 


 


Folate 


 


Free T4 


 


Thyroxine (T4) 


 


TSH 3rd Generation 


 


Arterial Blood Potassium  4.2


 


Blood Type 


 


Antibody Screen 


 


Crossmatch 


 


BBK History Checked 














Attending/Attestation





- Attestation


I have personally seen and examined this patient.: Yes


I have fully participated in the care of the patient.: Yes


I have reviewed all pertinent clinical information: Yes

## 2019-02-05 NOTE — CP.PCM.HP
Past Patient History





- Past Social History


Smoking Status: Never Smoked





- CARDIAC


Hx Cardiac Disorders: Yes


Hx Hypertension: Yes





- PULMONARY


Hx Respiratory Disorders: No





- NEUROLOGICAL


Hx Neurological Disorder: No





- HEENT


Hx HEENT Problems: No





- RENAL


Hx Chronic Kidney Disease: No





- ENDOCRINE/METABOLIC


Hx Endocrine Disorders: No





- HEMATOLOGICAL/ONCOLOGICAL


Hx Blood Transfusions: Yes


Hx Blood Transfusion Reaction: No





- INTEGUMENTARY


Hx Dermatological Problems: No


Other/Comment: BILATERAL LYMPEDEMA WITH 2 LARGE OPENING ONE IS TO RIGHT LOWER 

LATERAL SIDE OF RIGHT LEG AND 2ND TO BACKSIDE OF THIGH.DRAINING COPIOUS 

SEROUSANGUINEOUS DRAINAGE,





- MUSCULOSKELETAL/RHEUMATOLOGICAL


Hx Musculoskeletal Disorders: Yes





- GASTROINTESTINAL


Hx Gastrointestinal Disorders: No





- GENITOURINARY/GYNECOLOGICAL


Hx Genitourinary Disorders: No





- PSYCHIATRIC


Hx Psychophysiologic Disorder: No


Hx Substance Use: No





- SURGICAL HISTORY


Hx Orthopedic Surgery: Yes (right ankle)





- ANESTHESIA


Hx Anesthesia Reactions: No


Hx Malignant Hyperthermia: No





Meds


Allergies/Adverse Reactions: 


                                    Allergies











Allergy/AdvReac Type Severity Reaction Status Date / Time


 


No Known Allergies Allergy   Verified 03/18/16 19:49














Results





- Vital Signs


Recent Vital Signs: 





                                Last Vital Signs











Temp  98.9 F   02/05/19 06:20


 


Pulse  89   02/05/19 06:20


 


Resp  27 H  02/05/19 06:20


 


BP  156/73 H  02/05/19 06:20


 


Pulse Ox  100   02/05/19 06:20














- Labs


Result Diagrams: 


                                 02/05/19 03:10





                                 02/05/19 03:10


Labs: 





                         Laboratory Results - last 24 hr











  02/05/19 02/05/19 02/05/19





  03:10 03:10 03:10


 


WBC   9.2 


 


RBC   4.40 


 


Hgb   8.4 L 


 


Hct   32.0 L 


 


MCV   72.7 L 


 


MCH   19.1 L 


 


MCHC   26.3 L 


 


RDW   20.2 H 


 


Plt Count   308 


 


MPV   9.8 


 


PT    18.5 H


 


INR    1.67


 


APTT    30.0


 


pCO2   


 


pO2   


 


HCO3   


 


ABG pH   


 


ABG Total CO2   


 


ABG O2 Saturation   


 


ABG O2 Content   


 


ABG Base Excess   


 


ABG Hemoglobin   


 


ABG Carboxyhemoglobin   


 


POC ABG HHb (Measured)   


 


ABG Methemoglobin   


 


ABG O2 Capacity   


 


Hgb O2 Saturation   


 


FiO2   


 


Blood Gas Comments   


 


Crit Value Called To   


 


Crit Value Called By   


 


Blood Gas Notified Time   


 


Sodium  134  


 


Potassium  4.8  


 


Chloride  96 L  


 


Carbon Dioxide  32  


 


Anion Gap  10  


 


BUN  30 H  


 


Creatinine  1.0  


 


Est GFR ( Amer)  > 60  


 


Est GFR (Non-Af Amer)  > 60  


 


Random Glucose  117 H  


 


Calcium  8.1 L  


 


Total Bilirubin  0.9  


 


AST  62 H  


 


ALT  18  


 


Alkaline Phosphatase  40  


 


Lactate Dehydrogenase  598  


 


Total Creatine Kinase  91  


 


Troponin I  0.02  D  


 


NT-Pro-B Natriuret Pep  2910 H  


 


Total Protein  8.1  


 


Albumin  3.7  


 


Globulin  4.5  


 


Albumin/Globulin Ratio  0.8 L  














  02/05/19





  05:35


 


WBC 


 


RBC 


 


Hgb 


 


Hct 


 


MCV 


 


MCH 


 


MCHC 


 


RDW 


 


Plt Count 


 


MPV 


 


PT 


 


INR 


 


APTT 


 


pCO2  83 H*


 


pO2  162.0 H


 


HCO3  30.3 H


 


ABG pH  7.17 L*


 


ABG Total CO2  32.8 H


 


ABG O2 Saturation  99.4 H


 


ABG O2 Content  11.8 L


 


ABG Base Excess  0.9


 


ABG Hemoglobin  8.4 L


 


ABG Carboxyhemoglobin  2.5 H


 


POC ABG HHb (Measured)  0.6


 


ABG Methemoglobin  0.4


 


ABG O2 Capacity  11.9 L


 


Hgb O2 Saturation  96.4


 


FiO2  100.0


 


Blood Gas Comments  Ms prisca viramontes(rrt) will bring abg results to ed


 


Crit Value Called To  Reji aguilar rn ed


 


Crit Value Called By  [facility]


 


Blood Gas Notified Time  554


 


Sodium 


 


Potassium 


 


Chloride 


 


Carbon Dioxide 


 


Anion Gap 


 


BUN 


 


Creatinine 


 


Est GFR ( Amer) 


 


Est GFR (Non-Af Amer) 


 


Random Glucose 


 


Calcium 


 


Total Bilirubin 


 


AST 


 


ALT 


 


Alkaline Phosphatase 


 


Lactate Dehydrogenase 


 


Total Creatine Kinase 


 


Troponin I 


 


NT-Pro-B Natriuret Pep 


 


Total Protein 


 


Albumin 


 


Globulin 


 


Albumin/Globulin Ratio

## 2019-02-05 NOTE — CARD
--------------- APPROVED REPORT --------------





Date of service: 02/05/2019



EXAM: Two-dimensional and M-mode echocardiogram with Doppler and 

color Doppler.



INDICATION

Congestive Heart Failure 



2D DIMENSIONS 

Left Atrium (2D)4.8   (1.6-4.0cm)IVSd1.7   (0.7-1.1cm)

LVDd5.4   (3.9-5.9cm)PWd1.6   (0.7-1.1cm)

LVDs3.8   (2.5-4.0cm)FS (%) 29.8   %

LVEF (%)56.5   (>50%)



M-Mode DIMENSIONS 

Aortic Root4.60   (2.2-3.7cm)Aortic Cusp Exc.2.70   (1.5-2.0cm)



Aortic Valve

AoV Peak Ncvvfjze871.0cm/sAoV VTI37.6cmAO Peak GR.13mmHg

LVOT Peak Vvnqwggg380.0cm/sLVOT VTI25.80cmAO Mean GR.8mmHg

AI P 1/2 [id number]



Mitral Valve

MV E Epliovgo337.0cm/sMV A Zytfvvsv26.3cm/sE/A ratio1.2



TDI

Lateral E' Peak V14.60cm/sMedial E' Peak V10.80cm/sE/Lateral 

E'8.2

E/Medial E'11.0



Pulmonary Valve

PV Peak Vryimfrt78.2cm/sPV Peak Grad.2mmHg



Tricuspid Valve

TR Peak [id number]/sRAP [id number] Peak Gr.26mmHg

[id number]



 LEFT VENTRICLE 

The left ventricle is normal size. There is moderate concentric left 

ventricular hypertrophy. The left ventricular function is normal. The 

left ventricular ejection fraction is within the normal range. There 

is normal LV segmental wall motion. No left ventricle thrombus noted 

on this study.



 RIGHT VENTRICLE 

The right ventricle is normal size. There is normal right ventricular 

wall thickness. Systolic function is borderline reduced.



 ATRIA 

The left atrium is moderately dilated. The right atrium is mildly 

dilated.



 AORTIC VALVE 

The aortic valve is mildly thickened. There is moderate to severe 

aortic regurgitation. There is no aortic valvular stenosis.



 MITRAL VALVE 

The mitral valve is normal in structure. Mitral regurgitation is 

mild. There is no mitral valve stenosis.



 TRICUSPID VALVE 

There is mild tricuspid regurgitation. There is mild pulmonary 

hypertension.



 PULMONIC VALVE 

There is mild pulmonic valvular regurgitation.



 GREAT VESSELS 

The aortic root is moderately enlarged. The IVC is normal in size and 

collapses >50% with inspiration.



<Conclusion>

There is moderate concentric left ventricular hypertrophy.

The left ventricular function is normal.

The left ventricular ejection fraction is within the normal range.

There is normal LV segmental wall motion.

There is moderate to severe aortic regurgitation.

Mitral regurgitation is mild.

There is mild tricuspid regurgitation.

There is mild pulmonary hypertension.

The aortic root is moderately enlarged.

The left atrium is moderately dilated.

## 2019-02-06 LAB
ALBUMIN SERPL-MCNC: 3.4 G/DL (ref 3–4.8)
ALBUMIN/GLOB SERPL: 0.8 {RATIO} (ref 1.1–1.8)
ALT SERPL-CCNC: 21 U/L (ref 7–56)
ARTERIAL BLOOD GAS HEMOGLOBIN: 8.6 G/DL (ref 11.7–17.4)
ARTERIAL BLOOD GAS HEMOGLOBIN: 8.8 G/DL (ref 11.7–17.4)
ARTERIAL BLOOD GAS O2 SAT: 98.5 % (ref 95–98)
ARTERIAL BLOOD GAS O2 SAT: 99.1 % (ref 95–98)
ARTERIAL BLOOD GAS PCO2: 85 MM/HG (ref 35–45)
ARTERIAL BLOOD GAS PCO2: 99 MM/HG (ref 35–45)
ARTERIAL BLOOD GAS TCO2: 37.4 MMOL.L (ref 22–28)
ARTERIAL BLOOD GAS TCO2: 39.1 MMOL.L (ref 22–28)
AST SERPL-CCNC: 57 U/L (ref 17–59)
BASOPHILS # BLD AUTO: 0.01 K/MM3 (ref 0–2)
BASOPHILS NFR BLD: 0.1 % (ref 0–3)
BILIRUB DIRECT SERPL-MCNC: 0.7 MG/DL (ref 0–0.4)
BUN SERPL-MCNC: 26 MG/DL (ref 7–21)
CALCIUM SERPL-MCNC: 7.8 MG/DL (ref 8.4–10.5)
EOSINOPHIL # BLD: 0 10*3/UL (ref 0–0.7)
EOSINOPHIL NFR BLD: 0.1 % (ref 1.5–5)
ERYTHROCYTE [DISTWIDTH] IN BLOOD BY AUTOMATED COUNT: 20.1 % (ref 11.5–14.5)
GFR NON-AFRICAN AMERICAN: > 60
HCO3 BLDA-SCNC: 34.8 MMOL/L (ref 21–28)
HCO3 BLDA-SCNC: 36.1 MMOL/L (ref 21–28)
HGB BLD-MCNC: 8.5 G/DL (ref 14–18)
INHALED O2 CONCENTRATION: 100 %
INHALED O2 CONCENTRATION: 50 %
LYMPHOCYTES # BLD: 0.7 10*3/UL (ref 1.2–3.4)
LYMPHOCYTES NFR BLD AUTO: 7.8 % (ref 22–35)
MCH RBC QN AUTO: 19.6 PG (ref 25–35)
MCHC RBC AUTO-ENTMCNC: 25.7 G/DL (ref 31–37)
MCV RBC AUTO: 76.4 FL (ref 80–105)
MONOCYTES # BLD AUTO: 0.2 10*3/UL (ref 0.1–0.6)
MONOCYTES NFR BLD: 2.5 % (ref 1–6)
O2 CAP BLDA-SCNC: 12 ML/DL (ref 16–24)
O2 CAP BLDA-SCNC: 13 ML/DL (ref 16–24)
O2 CT BLDA-SCNC: 11.8 ML/DL (ref 15–23)
O2 CT BLDA-SCNC: 12.9 ML/DL (ref 15–23)
PH BLDA: 7.17 [PH] (ref 7.35–7.45)
PH BLDA: 7.22 [PH] (ref 7.35–7.45)
PLATELET # BLD: 241 10^3/UL (ref 120–450)
PMV BLD AUTO: 9.7 FL (ref 7–11)
PO2 BLDA: 118 MM/HG (ref 80–100)
PO2 BLDA: 339 MM/HG (ref 80–100)
RBC # BLD AUTO: 4.33 10^6/UL (ref 3.5–6.1)
WBC # BLD AUTO: 8.7 10^3/UL (ref 4.5–11)

## 2019-02-06 RX ADMIN — IPRATROPIUM BROMIDE AND ALBUTEROL SULFATE SCH ML: .5; 3 SOLUTION RESPIRATORY (INHALATION) at 08:23

## 2019-02-06 RX ADMIN — METHYLPREDNISOLONE SODIUM SUCCINATE SCH MG: 40 INJECTION, POWDER, FOR SOLUTION INTRAMUSCULAR; INTRAVENOUS at 14:28

## 2019-02-06 RX ADMIN — IPRATROPIUM BROMIDE AND ALBUTEROL SULFATE SCH ML: .5; 3 SOLUTION RESPIRATORY (INHALATION) at 19:40

## 2019-02-06 RX ADMIN — HEPARIN SODIUM PRN MLS/HR: 10000 INJECTION, SOLUTION INTRAVENOUS at 15:57

## 2019-02-06 RX ADMIN — LINEZOLID SCH MLS/HR: 600 INJECTION, SOLUTION INTRAVENOUS at 09:52

## 2019-02-06 RX ADMIN — METHYLPREDNISOLONE SODIUM SUCCINATE SCH MG: 40 INJECTION, POWDER, FOR SOLUTION INTRAMUSCULAR; INTRAVENOUS at 05:29

## 2019-02-06 RX ADMIN — HEPARIN SODIUM PRN MLS/HR: 10000 INJECTION, SOLUTION INTRAVENOUS at 03:55

## 2019-02-06 RX ADMIN — PANTOPRAZOLE SODIUM SCH MG: 40 TABLET, DELAYED RELEASE ORAL at 05:28

## 2019-02-06 RX ADMIN — METHYLPREDNISOLONE SODIUM SUCCINATE SCH MG: 40 INJECTION, POWDER, FOR SOLUTION INTRAMUSCULAR; INTRAVENOUS at 21:38

## 2019-02-06 RX ADMIN — IPRATROPIUM BROMIDE AND ALBUTEROL SULFATE SCH ML: .5; 3 SOLUTION RESPIRATORY (INHALATION) at 15:07

## 2019-02-06 NOTE — CP.CCUPN
<Lakisha Mohr - Last Filed: 02/06/19 11:49>





CCU Subjective





- Physician Review


Subjective (Free Text): 





02/06/19 11:42


Lakisha Mohr PGY1 Critical Care Progress Note





Patient seen and examined at bedside this morning. Patient was agitated last 

night and removed bipap for approximately 2 hours. ABG this morning showed 

worsening acidosis and BIPAP was resumed. Patient denies CP, SOB, nausea, 

vomiting, abdominal pain and urinary complaints. 





CCU Objective





- Vital Signs / Intake & Output


Vital Signs (Last 4 hours): 


Vital Signs











  Pulse Resp Pulse Ox


 


 02/06/19 08:25  88  


 


 02/06/19 08:00  88  30 H  98











Intake and Output (Last 8hrs): 


                                 Intake & Output











 02/05/19 02/06/19 02/06/19





 22:59 06:59 14:59


 


Intake Total 540 950 


 


Output Total 650 1100 


 


Balance -110 -150 


 


Intake:   


 


   950 


 


    antibiotics 400 700 


 


    cardizem 60  


 


    heparin 80  


 


Output:   


 


  Urine 650 1100 


 


    Urine, Voided 650 1100 


 


Other:   


 


  # Voids   


 


    Urine, Voided 1  


 


  # Bowel Movements 0  














- Physical Exam


Head: Positive for: Atraumatic, Normocephalic


Pupils: Positive for: PERRL


Extroacular Muscles: Positive for: EOMI


Conjunctiva: Positive for: Normal


Mouth: Positive for: Moist Mucous Membranes


Neck: Positive for: Normal Range of Motion


Respiratory/Chest: Positive for: Clear to Auscultation, Good Air Exchange.  

Negative for: Respiratory Distress, Accessory Muscle Use


Cardiovascular: Positive for: Normal S1, S2, Irregular Rhythm.  Negative for: 

Murmurs


Abdomen: Positive for: Normal Bowel Sounds, Other (globular ).  Negative for: 

Tenderness, Distention, Peritoneal Signs


Back: Positive for: Normal Inspection


Upper Extremity: Positive for: Normal Inspection.  Negative for: Cyanosis, Edema


Lower Extremity: Positive for: Neurovascularly Intact.  Negative for: Normal 

Inspection (Lymphedema bilaterally, with chronic venous stasis changes; dry 

skin), Any's Sign


Neurological: Positive for: GCS=15, CN II-XII Intact, Speech Normal, Motor Func 

Grossly Intact, Normal Sensory Function


Skin: Positive for: Warm, Dry, Normal Color.  Negative for: Rashes


Psychiatric: Positive for: Alert, Oriented x 3, Normal Insight, Normal 

Concentration





- Medications


Active Medications: 


Active Medications











Generic Name Dose Route Start Last Admin





  Trade Name Freq  PRN Reason Stop Dose Admin


 


Acetaminophen  650 mg  02/05/19 06:19  02/06/19 02:33





  Tylenol 325mg Tab  PO   650 mg





  Q6 PRN   Administration





  TEMP>=99.5F   





     





     





     


 


Acetaminophen  650 mg  02/05/19 06:19  





  Tylenol 650 Mg Supp  RC   





  Q6H PRN   





  TEMP>=99.5F   





     





     





     


 


Albuterol/Ipratropium  3 ml  02/06/19 11:31  





  Duoneb 3 Mg/0.5 Mg (3 Ml) Ud  IH   





  [id number] TREV   





     





     





     





     


 


Atorvastatin Calcium  40 mg  02/05/19 17:00  02/05/19 17:46





  Lipitor  PO   Not Given





  DIN TREV   





     





     





     





     


 


Docusate Sodium  100 mg  02/05/19 10:00  02/06/19 09:51





  Colace  PO   100 mg





  TID TREV   Administration





     





     





     





     


 


Furosemide  40 mg  02/05/19 06:30  02/06/19 05:29





  Lasix  IVP   40 mg





  Q8H TREV   Administration





     





     





     





     


 


diltiaZEM IVPB 100mg in NS  100 mls @ 5 mls/hr  02/05/19 02:55  02/05/19 22:26





  Cardizem 100mg In Ns  IV   5 mg/hr





  .Q20H PRN   5 mls/hr





  TITRATE PER MD ORDER   Administration





     





  Protocol   





  5 MG/HR   


 


Linezolid  600 mg in 300 mls @ 200 mls/hr  02/05/19 22:00  02/06/19 09:52





  Zyvox 600mg/300ml D5w  IVPB  02/12/19 22:01  200 mls/hr





  Q12 TREV   Administration





     





     





  Protocol   





     


 


Doxycycline Hyclate 100 mg/  100 mls @ 100 mls/hr  02/05/19 13:00  02/06/19 

09:52





  Sodium Chloride  IVPB   100 mls/hr





  Q12 TREV   Administration





     





     





  Protocol   





     


 


Heparin Sodium/Sodium Chloride  25,000 units in 250 mls @ 20.02 mls/hr  02/05/19

13:13  02/06/19 03:55





  Heparin 70880 Units/250ml 1/2 Normal Saline  IV   8.25 units/kg/hr





  .[id number] PRN   20.02 mls/hr





  ADJUST RATE PER PROTOCOL   Administration





     





  Protocol   





  8.25 UNITS/KG/HR   


 


Methylprednisolone  20 mg  02/05/19 13:00  02/06/19 05:29





  Solu-Medrol  IVP   20 mg





  Q8H TREV   Administration





     





     





     





     


 


Mupirocin  0 gm  02/06/19 10:00  





  Bactroban Ointment  TOP   





  BID TREV   





     





     





     





     


 


Ondansetron HCl  4 mg  02/05/19 06:19  





  Zofran Inj  IVP   





  Q4H PRN   





  Nausea/Vomiting   





     





     





     


 


Pantoprazole Sodium  40 mg  02/06/19 06:00  02/06/19 05:28





  Protonix Ec Tab  PO   40 mg





  0600 TREV   Administration





     





     





     





     














- Patient Studies


Lab Studies: 


                                   Lab Studies











  02/06/19 02/06/19 02/06/19 Range/Units





  08:30 06:00 06:00 


 


WBC    8.7  D  (4.5-11.0)  10^3/uL


 


RBC    4.33  (3.5-6.1)  10^6/uL


 


Hgb    8.5 L  (14.0-18.0)  g/dL


 


Hct    33.1 L  (42.0-52.0)  %


 


MCV    76.4 L  (80.0-105.0)  fl


 


MCH    19.6 L  (25.0-35.0)  pg


 


MCHC    25.7 L  (31.0-37.0)  g/dl


 


RDW    20.1 H  (11.5-14.5)  %


 


Plt Count    241  (120.0-450.0)  10^3/uL


 


MPV    9.7  (7.0-11.0)  fl


 


Neut % (Auto)    89.5 H  (50.0-68.0)  %


 


Lymph % (Auto)    7.8 L  (22.0-35.0)  %


 


Mono % (Auto)    2.5  (1.0-6.0)  %


 


Eos % (Auto)    0.1 L  (1.5-5.0)  %


 


Baso % (Auto)    0.1  (0.0-3.0)  %


 


Lymph # (Auto)    0.7 L  (1.2-3.4)  


 


Mono # (Auto)    0.2  (0.1-0.6)  


 


Eos # (Auto)    0.0  (0.0-0.7)  


 


Baso # (Auto)    0.01  (0.0-2.0)  K/mm3


 


Absolute Neuts (auto)    7.77 H  (1.4-6.5)  


 


APTT     (26.9-38.3)  Seconds


 


pCO2  85 H*    (35-45)  mm/Hg


 


pO2  118.0 H    ()  mm/Hg


 


HCO3  34.8 H    (21-28)  mmol/L


 


ABG pH  7.22 L    (7.35-7.45)  


 


ABG Total CO2  37.4 H    (22-28)  mmol.L


 


ABG O2 Saturation  98.5 H    (95-98)  %


 


ABG O2 Content  11.8 L    (15-23)  ML/dl


 


ABG Base Excess  5.6 H    (-2.0-3.0)  mmol/L


 


ABG Hemoglobin  8.6 L    (11.7-17.4)  g/dL


 


ABG Carboxyhemoglobin  2.0 H    (0.5-1.5)  %


 


POC ABG HHb (Measured)  1.5    (0-5)  %


 


ABG Methemoglobin  1.1    (0.0-3.0)  %


 


ABG O2 Capacity  12.0 L    (16-24)  mL/dl


 


ABG Potassium     (3.6-5.2)  mmol/L


 


Hgb O2 Saturation  95.4    (95.0-98.0)  %


 


Sodium   138   (132-148)  mmol/L


 


Chloride   97 L   ()  mmol/L


 


Glucose     ()  mg/dl


 


Lactate     (0.7-2.1)  mmol/L


 


FiO2  50.0    %


 


Inspiratory BiPAP     


 


Crit Value Called To  j.dr Ksenia    


 


Crit Value Called By  lin Lino    


 


Blood Gas Notified Time  843    


 


Potassium   5.1 H   (3.6-5.0)  mmol/L


 


Carbon Dioxide   35 H   (21-33)  mmol/L


 


Anion Gap   11   (10-20)  


 


BUN   26 H   (7-21)  mg/dL


 


Creatinine   0.9   (0.8-1.5)  mg/dl


 


Est GFR (African Amer)   > 60   


 


Est GFR (Non-Af Amer)   > 60   


 


Random Glucose   110   ()  mg/dL


 


Hemoglobin A1c     (4.2-6.5)  %


 


Calcium   7.8 L   (8.4-10.5)  mg/dL


 


Phosphorus   5.8 H   (2.5-4.5)  mg/dL


 


Magnesium   1.8   (1.7-2.2)  mg/dL


 


Ferritin     ng/mL


 


Total Bilirubin   1.0   (0.2-1.3)  mg/dL


 


Direct Bilirubin   0.7 H   (0.0-0.4)  mg/dL


 


AST   57   (17-59)  U/L


 


ALT   21   (7-56)  U/L


 


Alkaline Phosphatase   35 L   ()  U/L


 


Total Creatine Kinase     ()  U/L


 


Troponin I     ng/mL


 


Total Protein   7.7   (5.8-8.3)  g/dL


 


Albumin   3.4   (3.0-4.8)  g/dL


 


Globulin   4.3   gm/dL


 


Albumin/Globulin Ratio   0.8 L   (1.1-1.8)  


 


Vitamin B12     (239-931)  pg/mL


 


25-OH Vitamin D Total     (30.0-100.0)  NG/ML


 


Folate     ng/mL


 


Arterial Blood Potassium     (3.6-5.2)  mmol/L


 


Crossmatch     














  02/06/19 02/06/19 02/05/19 Range/Units





  03:18 02:40 23:00 


 


WBC     (4.5-11.0)  10^3/uL


 


RBC     (3.5-6.1)  10^6/uL


 


Hgb     (14.0-18.0)  g/dL


 


Hct     (42.0-52.0)  %


 


MCV     (80.0-105.0)  fl


 


MCH     (25.0-35.0)  pg


 


MCHC     (31.0-37.0)  g/dl


 


RDW     (11.5-14.5)  %


 


Plt Count     (120.0-450.0)  10^3/uL


 


MPV     (7.0-11.0)  fl


 


Neut % (Auto)     (50.0-68.0)  %


 


Lymph % (Auto)     (22.0-35.0)  %


 


Mono % (Auto)     (1.0-6.0)  %


 


Eos % (Auto)     (1.5-5.0)  %


 


Baso % (Auto)     (0.0-3.0)  %


 


Lymph # (Auto)     (1.2-3.4)  


 


Mono # (Auto)     (0.1-0.6)  


 


Eos # (Auto)     (0.0-0.7)  


 


Baso # (Auto)     (0.0-2.0)  K/mm3


 


Absolute Neuts (auto)     (1.4-6.5)  


 


APTT   76.8 H   (26.9-38.3)  Seconds


 


pCO2  99 H*   78 H*  (35-45)  mm/Hg


 


pO2  339.0 H   105.0 H  ()  mm/Hg


 


HCO3  36.1 H   31.9 H  (21-28)  mmol/L


 


ABG pH  7.17 L*   7.22 L  (7.35-7.45)  


 


ABG Total CO2  39.1 H   34.3 H  (22-28)  mmol.L


 


ABG O2 Saturation  99.1 H   97.9  (95-98)  %


 


ABG O2 Content  12.9 L   12.1 L  (15-23)  ML/dl


 


ABG Base Excess  5.8 H   3.0  (-2.0-3.0)  mmol/L


 


ABG Hemoglobin  8.8 L   8.9 L  (11.7-17.4)  g/dL


 


ABG Carboxyhemoglobin  1.9 H   2.0 H  (0.5-1.5)  %


 


POC ABG HHb (Measured)  0.9   2.0  (0-5)  %


 


ABG Methemoglobin  0.4   0.7  (0.0-3.0)  %


 


ABG O2 Capacity  13.0 L   12.4 L  (16-24)  mL/dl


 


ABG Potassium     (3.6-5.2)  mmol/L


 


Hgb O2 Saturation  96.8   95.4  (95.0-98.0)  %


 


Sodium     (132-148)  mmol/L


 


Chloride     ()  mmol/L


 


Glucose     ()  mg/dl


 


Lactate     (0.7-2.1)  mmol/L


 


FiO2  100.0   100.0  %


 


Inspiratory BiPAP     


 


Crit Value Called To  suma hopson Dr.  


 


Crit Value Called By  Manpreet lott   [address]  


 


Blood Gas Notified Time  335   2330  


 


Potassium     (3.6-5.0)  mmol/L


 


Carbon Dioxide     (21-33)  mmol/L


 


Anion Gap     (10-20)  


 


BUN     (7-21)  mg/dL


 


Creatinine     (0.8-1.5)  mg/dl


 


Est GFR (African Amer)     


 


Est GFR (Non-Af Amer)     


 


Random Glucose     ()  mg/dL


 


Hemoglobin A1c     (4.2-6.5)  %


 


Calcium     (8.4-10.5)  mg/dL


 


Phosphorus     (2.5-4.5)  mg/dL


 


Magnesium     (1.7-2.2)  mg/dL


 


Ferritin     ng/mL


 


Total Bilirubin     (0.2-1.3)  mg/dL


 


Direct Bilirubin     (0.0-0.4)  mg/dL


 


AST     (17-59)  U/L


 


ALT     (7-56)  U/L


 


Alkaline Phosphatase     ()  U/L


 


Total Creatine Kinase     ()  U/L


 


Troponin I     ng/mL


 


Total Protein     (5.8-8.3)  g/dL


 


Albumin     (3.0-4.8)  g/dL


 


Globulin     gm/dL


 


Albumin/Globulin Ratio     (1.1-1.8)  


 


Vitamin B12     (239-931)  pg/mL


 


25-OH Vitamin D Total     (30.0-100.0)  NG/ML


 


Folate     ng/mL


 


Arterial Blood Potassium     (3.6-5.2)  mmol/L


 


Crossmatch     














  02/05/19 02/05/19 02/05/19 Range/Units





  20:20 17:30 17:30 


 


WBC   11.0   (4.5-11.0)  10^3/uL


 


RBC   4.56   (3.5-6.1)  10^6/uL


 


Hgb   8.9 L   (14.0-18.0)  g/dL


 


Hct   34.5 L   (42.0-52.0)  %


 


MCV   75.7 L D   (80.0-105.0)  fl


 


MCH   19.5 L   (25.0-35.0)  pg


 


MCHC   25.8 L   (31.0-37.0)  g/dl


 


RDW   20.2 H   (11.5-14.5)  %


 


Plt Count   241   (120.0-450.0)  10^3/uL


 


MPV   9.1   (7.0-11.0)  fl


 


Neut % (Auto)     (50.0-68.0)  %


 


Lymph % (Auto)     (22.0-35.0)  %


 


Mono % (Auto)     (1.0-6.0)  %


 


Eos % (Auto)     (1.5-5.0)  %


 


Baso % (Auto)     (0.0-3.0)  %


 


Lymph # (Auto)     (1.2-3.4)  


 


Mono # (Auto)     (0.1-0.6)  


 


Eos # (Auto)     (0.0-0.7)  


 


Baso # (Auto)     (0.0-2.0)  K/mm3


 


Absolute Neuts (auto)     (1.4-6.5)  


 


APTT  55.1 H    (26.9-38.3)  Seconds


 


pCO2     (35-45)  mm/Hg


 


pO2     ()  mm/Hg


 


HCO3     (21-28)  mmol/L


 


ABG pH     (7.35-7.45)  


 


ABG Total CO2     (22-28)  mmol.L


 


ABG O2 Saturation     (95-98)  %


 


ABG O2 Content     (15-23)  ML/dl


 


ABG Base Excess     (-2.0-3.0)  mmol/L


 


ABG Hemoglobin     (11.7-17.4)  g/dL


 


ABG Carboxyhemoglobin     (0.5-1.5)  %


 


POC ABG HHb (Measured)     (0-5)  %


 


ABG Methemoglobin     (0.0-3.0)  %


 


ABG O2 Capacity     (16-24)  mL/dl


 


ABG Potassium     (3.6-5.2)  mmol/L


 


Hgb O2 Saturation     (95.0-98.0)  %


 


Sodium     (132-148)  mmol/L


 


Chloride     ()  mmol/L


 


Glucose     ()  mg/dl


 


Lactate     (0.7-2.1)  mmol/L


 


FiO2     %


 


Inspiratory BiPAP     


 


Crit Value Called To     


 


Crit Value Called By     


 


Blood Gas Notified Time     


 


Potassium     (3.6-5.0)  mmol/L


 


Carbon Dioxide     (21-33)  mmol/L


 


Anion Gap     (10-20)  


 


BUN     (7-21)  mg/dL


 


Creatinine     (0.8-1.5)  mg/dl


 


Est GFR (African Amer)     


 


Est GFR (Non-Af Amer)     


 


Random Glucose     ()  mg/dL


 


Hemoglobin A1c     (4.2-6.5)  %


 


Calcium     (8.4-10.5)  mg/dL


 


Phosphorus     (2.5-4.5)  mg/dL


 


Magnesium     (1.7-2.2)  mg/dL


 


Ferritin     ng/mL


 


Total Bilirubin     (0.2-1.3)  mg/dL


 


Direct Bilirubin     (0.0-0.4)  mg/dL


 


AST     (17-59)  U/L


 


ALT     (7-56)  U/L


 


Alkaline Phosphatase     ()  U/L


 


Total Creatine Kinase    67  ()  U/L


 


Troponin I    0.02  ng/mL


 


Total Protein     (5.8-8.3)  g/dL


 


Albumin     (3.0-4.8)  g/dL


 


Globulin     gm/dL


 


Albumin/Globulin Ratio     (1.1-1.8)  


 


Vitamin B12     (239-931)  pg/mL


 


25-OH Vitamin D Total     (30.0-100.0)  NG/ML


 


Folate     ng/mL


 


Arterial Blood Potassium     (3.6-5.2)  mmol/L


 


Crossmatch     














  02/05/19 02/05/19 02/05/19 Range/Units





  15:10 11:00 10:10 


 


WBC     (4.5-11.0)  10^3/uL


 


RBC     (3.5-6.1)  10^6/uL


 


Hgb     (14.0-18.0)  g/dL


 


Hct     (42.0-52.0)  %


 


MCV     (80.0-105.0)  fl


 


MCH     (25.0-35.0)  pg


 


MCHC     (31.0-37.0)  g/dl


 


RDW     (11.5-14.5)  %


 


Plt Count     (120.0-450.0)  10^3/uL


 


MPV     (7.0-11.0)  fl


 


Neut % (Auto)     (50.0-68.0)  %


 


Lymph % (Auto)     (22.0-35.0)  %


 


Mono % (Auto)     (1.0-6.0)  %


 


Eos % (Auto)     (1.5-5.0)  %


 


Baso % (Auto)     (0.0-3.0)  %


 


Lymph # (Auto)     (1.2-3.4)  


 


Mono # (Auto)     (0.1-0.6)  


 


Eos # (Auto)     (0.0-0.7)  


 


Baso # (Auto)     (0.0-2.0)  K/mm3


 


Absolute Neuts (auto)     (1.4-6.5)  


 


APTT     (26.9-38.3)  Seconds


 


pCO2  76 H*    (35-45)  mm/Hg


 


pO2  107.0 H    ()  mm/Hg


 


HCO3  32.6 H    (21-28)  mmol/L


 


ABG pH  7.24 L    (7.35-7.45)  


 


ABG Total CO2  34.9 H    (22-28)  mmol.L


 


ABG O2 Saturation  98.5 H    (95-98)  %


 


ABG O2 Content     (15-23)  ML/dl


 


ABG Base Excess  2.8    (-2.0-3.0)  mmol/L


 


ABG Hemoglobin     (11.7-17.4)  g/dL


 


ABG Carboxyhemoglobin     (0.5-1.5)  %


 


POC ABG HHb (Measured)     (0-5)  %


 


ABG Methemoglobin     (0.0-3.0)  %


 


ABG O2 Capacity     (16-24)  mL/dl


 


ABG Potassium  4.2    (3.6-5.2)  mmol/L


 


Hgb O2 Saturation     (95.0-98.0)  %


 


Sodium  137.0    (132-148)  mmol/L


 


Chloride  103.0    ()  mmol/L


 


Glucose  90    ()  mg/dl


 


Lactate  0.6 L    (0.7-2.1)  mmol/L


 


FiO2  40.0    %


 


Inspiratory BiPAP  22    


 


Crit Value Called To  narciso Boucher    


 


Crit Value Called By  lin Lino    


 


Blood Gas Notified Time  1314    


 


Potassium     (3.6-5.0)  mmol/L


 


Carbon Dioxide     (21-33)  mmol/L


 


Anion Gap     (10-20)  


 


BUN     (7-21)  mg/dL


 


Creatinine     (0.8-1.5)  mg/dl


 


Est GFR (African Amer)     


 


Est GFR (Non-Af Amer)     


 


Random Glucose     ()  mg/dL


 


Hemoglobin A1c     (4.2-6.5)  %


 


Calcium     (8.4-10.5)  mg/dL


 


Phosphorus     (2.5-4.5)  mg/dL


 


Magnesium     (1.7-2.2)  mg/dL


 


Ferritin     ng/mL


 


Total Bilirubin     (0.2-1.3)  mg/dL


 


Direct Bilirubin     (0.0-0.4)  mg/dL


 


AST     (17-59)  U/L


 


ALT     (7-56)  U/L


 


Alkaline Phosphatase     ()  U/L


 


Total Creatine Kinase     ()  U/L


 


Troponin I     ng/mL


 


Total Protein     (5.8-8.3)  g/dL


 


Albumin     (3.0-4.8)  g/dL


 


Globulin     gm/dL


 


Albumin/Globulin Ratio     (1.1-1.8)  


 


Vitamin B12     (239-931)  pg/mL


 


25-OH Vitamin D Total   5 L  < 12.8 L  (30.0-100.0)  NG/ML


 


Folate     ng/mL


 


Arterial Blood Potassium  4.2    (3.6-5.2)  mmol/L


 


Crossmatch     














  02/05/19 02/05/19 02/05/19 Range/Units





  06:00 06:00 06:00 


 


WBC     (4.5-11.0)  10^3/uL


 


RBC     (3.5-6.1)  10^6/uL


 


Hgb     (14.0-18.0)  g/dL


 


Hct     (42.0-52.0)  %


 


MCV     (80.0-105.0)  fl


 


MCH     (25.0-35.0)  pg


 


MCHC     (31.0-37.0)  g/dl


 


RDW     (11.5-14.5)  %


 


Plt Count     (120.0-450.0)  10^3/uL


 


MPV     (7.0-11.0)  fl


 


Neut % (Auto)     (50.0-68.0)  %


 


Lymph % (Auto)     (22.0-35.0)  %


 


Mono % (Auto)     (1.0-6.0)  %


 


Eos % (Auto)     (1.5-5.0)  %


 


Baso % (Auto)     (0.0-3.0)  %


 


Lymph # (Auto)     (1.2-3.4)  


 


Mono # (Auto)     (0.1-0.6)  


 


Eos # (Auto)     (0.0-0.7)  


 


Baso # (Auto)     (0.0-2.0)  K/mm3


 


Absolute Neuts (auto)     (1.4-6.5)  


 


APTT     (26.9-38.3)  Seconds


 


pCO2     (35-45)  mm/Hg


 


pO2     ()  mm/Hg


 


HCO3     (21-28)  mmol/L


 


ABG pH     (7.35-7.45)  


 


ABG Total CO2     (22-28)  mmol.L


 


ABG O2 Saturation     (95-98)  %


 


ABG O2 Content     (15-23)  ML/dl


 


ABG Base Excess     (-2.0-3.0)  mmol/L


 


ABG Hemoglobin     (11.7-17.4)  g/dL


 


ABG Carboxyhemoglobin     (0.5-1.5)  %


 


POC ABG HHb (Measured)     (0-5)  %


 


ABG Methemoglobin     (0.0-3.0)  %


 


ABG O2 Capacity     (16-24)  mL/dl


 


ABG Potassium     (3.6-5.2)  mmol/L


 


Hgb O2 Saturation     (95.0-98.0)  %


 


Sodium     (132-148)  mmol/L


 


Chloride     ()  mmol/L


 


Glucose     ()  mg/dl


 


Lactate     (0.7-2.1)  mmol/L


 


FiO2     %


 


Inspiratory BiPAP     


 


Crit Value Called To     


 


Crit Value Called By     


 


Blood Gas Notified Time     


 


Potassium     (3.6-5.0)  mmol/L


 


Carbon Dioxide     (21-33)  mmol/L


 


Anion Gap     (10-20)  


 


BUN     (7-21)  mg/dL


 


Creatinine     (0.8-1.5)  mg/dl


 


Est GFR (African Amer)     


 


Est GFR (Non-Af Amer)     


 


Random Glucose     ()  mg/dL


 


Hemoglobin A1c  6.2    (4.2-6.5)  %


 


Calcium     (8.4-10.5)  mg/dL


 


Phosphorus     (2.5-4.5)  mg/dL


 


Magnesium     (1.7-2.2)  mg/dL


 


Ferritin   20.2   ng/mL


 


Total Bilirubin     (0.2-1.3)  mg/dL


 


Direct Bilirubin     (0.0-0.4)  mg/dL


 


AST     (17-59)  U/L


 


ALT     (7-56)  U/L


 


Alkaline Phosphatase     ()  U/L


 


Total Creatine Kinase     ()  U/L


 


Troponin I     ng/mL


 


Total Protein     (5.8-8.3)  g/dL


 


Albumin     (3.0-4.8)  g/dL


 


Globulin     gm/dL


 


Albumin/Globulin Ratio     (1.1-1.8)  


 


Vitamin B12   797   (239-931)  pg/mL


 


25-OH Vitamin D Total     (30.0-100.0)  NG/ML


 


Folate   7.7   ng/mL


 


Arterial Blood Potassium     (3.6-5.2)  mmol/L


 


Crossmatch    See Detail  








                         Laboratory Results - last 24 hr











  02/05/19 02/05/19 02/05/19





  06:00 06:00 06:00


 


WBC   


 


RBC   


 


Hgb   


 


Hct   


 


MCV   


 


MCH   


 


MCHC   


 


RDW   


 


Plt Count   


 


MPV   


 


Neut % (Auto)   


 


Lymph % (Auto)   


 


Mono % (Auto)   


 


Eos % (Auto)   


 


Baso % (Auto)   


 


Lymph # (Auto)   


 


Mono # (Auto)   


 


Eos # (Auto)   


 


Baso # (Auto)   


 


Absolute Neuts (auto)   


 


APTT   


 


pCO2   


 


pO2   


 


HCO3   


 


ABG pH   


 


ABG Total CO2   


 


ABG O2 Saturation   


 


ABG O2 Content   


 


ABG Base Excess   


 


ABG Hemoglobin   


 


ABG Carboxyhemoglobin   


 


POC ABG HHb (Measured)   


 


ABG Methemoglobin   


 


ABG O2 Capacity   


 


ABG Potassium   


 


Hgb O2 Saturation   


 


Sodium   


 


Chloride   


 


Glucose   


 


Lactate   


 


FiO2   


 


Inspiratory BiPAP   


 


Crit Value Called To   


 


Crit Value Called By   


 


Blood Gas Notified Time   


 


Potassium   


 


Carbon Dioxide   


 


Anion Gap   


 


BUN   


 


Creatinine   


 


Est GFR ( Amer)   


 


Est GFR (Non-Af Amer)   


 


Random Glucose   


 


Hemoglobin A1c    6.2


 


Calcium   


 


Phosphorus   


 


Magnesium   


 


Ferritin   20.2 


 


Total Bilirubin   


 


Direct Bilirubin   


 


AST   


 


ALT   


 


Alkaline Phosphatase   


 


Total Creatine Kinase   


 


Troponin I   


 


Total Protein   


 


Albumin   


 


Globulin   


 


Albumin/Globulin Ratio   


 


Vitamin B12   797 


 


25-OH Vitamin D Total   


 


Folate   7.7 


 


Arterial Blood Potassium   


 


Crossmatch  See Detail  














  02/05/19 02/05/19 02/05/19





  10:10 11:00 15:10


 


WBC   


 


RBC   


 


Hgb   


 


Hct   


 


MCV   


 


MCH   


 


MCHC   


 


RDW   


 


Plt Count   


 


MPV   


 


Neut % (Auto)   


 


Lymph % (Auto)   


 


Mono % (Auto)   


 


Eos % (Auto)   


 


Baso % (Auto)   


 


Lymph # (Auto)   


 


Mono # (Auto)   


 


Eos # (Auto)   


 


Baso # (Auto)   


 


Absolute Neuts (auto)   


 


APTT   


 


pCO2    76 H*


 


pO2    107.0 H


 


HCO3    32.6 H


 


ABG pH    7.24 L


 


ABG Total CO2    34.9 H


 


ABG O2 Saturation    98.5 H


 


ABG O2 Content   


 


ABG Base Excess    2.8


 


ABG Hemoglobin   


 


ABG Carboxyhemoglobin   


 


POC ABG HHb (Measured)   


 


ABG Methemoglobin   


 


ABG O2 Capacity   


 


ABG Potassium    4.2


 


Hgb O2 Saturation   


 


Sodium    137.0


 


Chloride    103.0


 


Glucose    90


 


Lactate    0.6 L


 


FiO2    40.0


 


Inspiratory BiPAP    22


 


Crit Value Called To    narciso Boucher


 


Crit Value Called By    lin Lino


 


Blood Gas Notified Time    1314


 


Potassium   


 


Carbon Dioxide   


 


Anion Gap   


 


BUN   


 


Creatinine   


 


Est GFR ( Amer)   


 


Est GFR (Non-Af Amer)   


 


Random Glucose   


 


Hemoglobin A1c   


 


Calcium   


 


Phosphorus   


 


Magnesium   


 


Ferritin   


 


Total Bilirubin   


 


Direct Bilirubin   


 


AST   


 


ALT   


 


Alkaline Phosphatase   


 


Total Creatine Kinase   


 


Troponin I   


 


Total Protein   


 


Albumin   


 


Globulin   


 


Albumin/Globulin Ratio   


 


Vitamin B12   


 


25-OH Vitamin D Total  < 12.8 L  5 L 


 


Folate   


 


Arterial Blood Potassium    4.2


 


Crossmatch   














  02/05/19 02/05/19 02/05/19





  17:30 17:30 20:20


 


WBC   11.0 


 


RBC   4.56 


 


Hgb   8.9 L 


 


Hct   34.5 L 


 


MCV   75.7 L D 


 


MCH   19.5 L 


 


MCHC   25.8 L 


 


RDW   20.2 H 


 


Plt Count   241 


 


MPV   9.1 


 


Neut % (Auto)   


 


Lymph % (Auto)   


 


Mono % (Auto)   


 


Eos % (Auto)   


 


Baso % (Auto)   


 


Lymph # (Auto)   


 


Mono # (Auto)   


 


Eos # (Auto)   


 


Baso # (Auto)   


 


Absolute Neuts (auto)   


 


APTT    55.1 H


 


pCO2   


 


pO2   


 


HCO3   


 


ABG pH   


 


ABG Total CO2   


 


ABG O2 Saturation   


 


ABG O2 Content   


 


ABG Base Excess   


 


ABG Hemoglobin   


 


ABG Carboxyhemoglobin   


 


POC ABG HHb (Measured)   


 


ABG Methemoglobin   


 


ABG O2 Capacity   


 


ABG Potassium   


 


Hgb O2 Saturation   


 


Sodium   


 


Chloride   


 


Glucose   


 


Lactate   


 


FiO2   


 


Inspiratory BiPAP   


 


Crit Value Called To   


 


Crit Value Called By   


 


Blood Gas Notified Time   


 


Potassium   


 


Carbon Dioxide   


 


Anion Gap   


 


BUN   


 


Creatinine   


 


Est GFR ( Amer)   


 


Est GFR (Non-Af Amer)   


 


Random Glucose   


 


Hemoglobin A1c   


 


Calcium   


 


Phosphorus   


 


Magnesium   


 


Ferritin   


 


Total Bilirubin   


 


Direct Bilirubin   


 


AST   


 


ALT   


 


Alkaline Phosphatase   


 


Total Creatine Kinase  67  


 


Troponin I  0.02  


 


Total Protein   


 


Albumin   


 


Globulin   


 


Albumin/Globulin Ratio   


 


Vitamin B12   


 


25-OH Vitamin D Total   


 


Folate   


 


Arterial Blood Potassium   


 


Crossmatch   














  02/05/19 02/06/19 02/06/19





  23:00 02:40 03:18


 


WBC   


 


RBC   


 


Hgb   


 


Hct   


 


MCV   


 


MCH   


 


MCHC   


 


RDW   


 


Plt Count   


 


MPV   


 


Neut % (Auto)   


 


Lymph % (Auto)   


 


Mono % (Auto)   


 


Eos % (Auto)   


 


Baso % (Auto)   


 


Lymph # (Auto)   


 


Mono # (Auto)   


 


Eos # (Auto)   


 


Baso # (Auto)   


 


Absolute Neuts (auto)   


 


APTT   76.8 H 


 


pCO2  78 H*   99 H*


 


pO2  105.0 H   339.0 H


 


HCO3  31.9 H   36.1 H


 


ABG pH  7.22 L   7.17 L*


 


ABG Total CO2  34.3 H   39.1 H


 


ABG O2 Saturation  97.9   99.1 H


 


ABG O2 Content  12.1 L   12.9 L


 


ABG Base Excess  3.0   5.8 H


 


ABG Hemoglobin  8.9 L   8.8 L


 


ABG Carboxyhemoglobin  2.0 H   1.9 H


 


POC ABG HHb (Measured)  2.0   0.9


 


ABG Methemoglobin  0.7   0.4


 


ABG O2 Capacity  12.4 L   13.0 L


 


ABG Potassium   


 


Hgb O2 Saturation  95.4   96.8


 


Sodium   


 


Chloride   


 


Glucose   


 


Lactate   


 


FiO2  100.0   100.0


 


Inspiratory BiPAP   


 


Crit Value Called To  suma Velazquez dr.


 


Crit Value Called By  Jennifer lott


 


Blood Gas Notified Time  2330   335


 


Potassium   


 


Carbon Dioxide   


 


Anion Gap   


 


BUN   


 


Creatinine   


 


Est GFR ( Amer)   


 


Est GFR (Non-Af Amer)   


 


Random Glucose   


 


Hemoglobin A1c   


 


Calcium   


 


Phosphorus   


 


Magnesium   


 


Ferritin   


 


Total Bilirubin   


 


Direct Bilirubin   


 


AST   


 


ALT   


 


Alkaline Phosphatase   


 


Total Creatine Kinase   


 


Troponin I   


 


Total Protein   


 


Albumin   


 


Globulin   


 


Albumin/Globulin Ratio   


 


Vitamin B12   


 


25-OH Vitamin D Total   


 


Folate   


 


Arterial Blood Potassium   


 


Crossmatch   














  02/06/19 02/06/19 02/06/19





  06:00 06:00 08:30


 


WBC  8.7  D  


 


RBC  4.33  


 


Hgb  8.5 L  


 


Hct  33.1 L  


 


MCV  76.4 L  


 


MCH  19.6 L  


 


MCHC  25.7 L  


 


RDW  20.1 H  


 


Plt Count  241  


 


MPV  9.7  


 


Neut % (Auto)  89.5 H  


 


Lymph % (Auto)  7.8 L  


 


Mono % (Auto)  2.5  


 


Eos % (Auto)  0.1 L  


 


Baso % (Auto)  0.1  


 


Lymph # (Auto)  0.7 L  


 


Mono # (Auto)  0.2  


 


Eos # (Auto)  0.0  


 


Baso # (Auto)  0.01  


 


Absolute Neuts (auto)  7.77 H  


 


APTT   


 


pCO2    85 H*


 


pO2    118.0 H


 


HCO3    34.8 H


 


ABG pH    7.22 L


 


ABG Total CO2    37.4 H


 


ABG O2 Saturation    98.5 H


 


ABG O2 Content    11.8 L


 


ABG Base Excess    5.6 H


 


ABG Hemoglobin    8.6 L


 


ABG Carboxyhemoglobin    2.0 H


 


POC ABG HHb (Measured)    1.5


 


ABG Methemoglobin    1.1


 


ABG O2 Capacity    12.0 L


 


ABG Potassium   


 


Hgb O2 Saturation    95.4


 


Sodium   138 


 


Chloride   97 L 


 


Glucose   


 


Lactate   


 


FiO2    50.0


 


Inspiratory BiPAP   


 


Crit Value Called To    j.dr Ksenia


 


Crit Value Called By    lin Lino


 


Blood Gas Notified Time    843


 


Potassium   5.1 H 


 


Carbon Dioxide   35 H 


 


Anion Gap   11 


 


BUN   26 H 


 


Creatinine   0.9 


 


Est GFR ( Amer)   > 60 


 


Est GFR (Non-Af Amer)   > 60 


 


Random Glucose   110 


 


Hemoglobin A1c   


 


Calcium   7.8 L 


 


Phosphorus   5.8 H 


 


Magnesium   1.8 


 


Ferritin   


 


Total Bilirubin   1.0 


 


Direct Bilirubin   0.7 H 


 


AST   57 


 


ALT   21 


 


Alkaline Phosphatase   35 L 


 


Total Creatine Kinase   


 


Troponin I   


 


Total Protein   7.7 


 


Albumin   3.4 


 


Globulin   4.3 


 


Albumin/Globulin Ratio   0.8 L 


 


Vitamin B12   


 


25-OH Vitamin D Total   


 


Folate   


 


Arterial Blood Potassium   


 


Crossmatch   











Radiology Impressions: 


                              Radiology Impressions





Extremity Ultrasound  02/05/19 02:29


IMPRESSION:


No sonographic evidence for deep venous thrombosis in the visualized 


segments of both lower extremities. 


 


Extremely limited study 


 


 








Lung Scan Nuclear Medicine  02/05/19 15:00


IMPRESSION:


Lowprobability ventilation perfusion scan for pulmonary embolism. 


 


 














Critical Care Progress Note





- Nutrition


Nutrition: 


                                    Nutrition











 Category Date Time Status


 


 NPO Diet [DIET] Diets  02/05/19 Breakfast Ordered














Assessment/Plan





- Assessment and Plan (Free Text)


Assessment: 





This is a 58 year old male with PMH of morbid obesity, B/L chronic leg 

lymphedema and obesity hypoventilation syndrome presenting to the ICU for 

hypercapnic respiratory failure 2/2 LORIN vs COPD currently on BIPAP.  





Plan: 





Hypercapnic respiratory failure 2/2 LORIN vs COPD


-currently on BIPAP IPAP/EPAP 24/16 with FiO2 of 30%, will titrate down as to

lerated


-ABG this morning pH/CO2/O2/bicarb 7/17/99/339/35


-solumedrol 20mg q8 IVP


-low threshold for intubation


-AOx3, protecting airway


-anesthesiology following patient 


-V/Q scan shows low probability for PE


-LE dopplers shows no DVT


-CXR 2/5/19 shows no active disease 





New onset Afib - rate controlled


-currently on cardizem drip at 5mcg/hr 


-currently on heparin drip 


-echo 2/5/19 shows EF 56%, moderate to severe AR, left atrium dilated, RVS 36


-Cardiology on consult, Dr. Cardona





B/L Chronic leg lymphedema 


-concern for leg cellulitis


-currently on doxyxycline, zyvox 


-WBC is downtrending, afebrile


-blood culture pending 


-lasix 40mg IV q8


-ID on consult, Dr. Olvera





Microcytic anemia


-Hg is 8.5 from 8.9. s/p 1 unit PRBC


-darren monitor 


-GI on consult, no intervention at this time 


-Heme on consult, Dr. Orellana 





PPX with protonix and heparin drip





Patient seen and case discussed with attending, Dr. Marsh 





<Jose Eduardo Marsh - Last Filed: 02/06/19 12:18>





CCU Objective





- Vital Signs / Intake & Output


Vital Signs (Last 4 hours): 


Vital Signs











  Pulse


 


 02/06/19 08:25  88











Intake and Output (Last 8hrs): 


                                 Intake & Output











 02/05/19 02/06/19 02/06/19





 22:59 06:59 14:59


 


Intake Total 540 950 


 


Output Total 650 1100 


 


Balance -110 -150 


 


Intake:   


 


   950 


 


    antibiotics 400 700 


 


    cardizem 60  


 


    heparin 80  


 


Output:   


 


  Urine 650 1100 


 


    Urine, Voided 650 1100 


 


Other:   


 


  # Voids   


 


    Urine, Voided 1  


 


  # Bowel Movements 0  














- Medications


Active Medications: 


Active Medications











Generic Name Dose Route Start Last Admin





  Trade Name Freq  PRN Reason Stop Dose Admin


 


Acetaminophen  650 mg  02/05/19 06:19  02/06/19 02:33





  Tylenol 325mg Tab  PO   650 mg





  Q6 PRN   Administration





  TEMP>=99.5F   





     





     





     


 


Acetaminophen  650 mg  02/05/19 06:19  





  Tylenol 650 Mg Supp  RC   





  Q6H PRN   





  TEMP>=99.5F   





     





     





     


 


Albuterol/Ipratropium  3 ml  02/06/19 11:31  





  Duoneb 3 Mg/0.5 Mg (3 Ml) Ud  IH   





  [id number] TREV   





     





     





     





     


 


Atorvastatin Calcium  40 mg  02/05/19 17:00  02/05/19 17:46





  Lipitor  PO   Not Given





  DIN TREV   





     





     





     





     


 


Docusate Sodium  100 mg  02/05/19 10:00  02/06/19 09:51





  Colace  PO   100 mg





  TID TREV   Administration





     





     





     





     


 


Furosemide  40 mg  02/05/19 06:30  02/06/19 05:29





  Lasix  IVP   40 mg





  Q8H TREV   Administration





     





     





     





     


 


diltiaZEM IVPB 100mg in NS  100 mls @ 5 mls/hr  02/05/19 02:55  02/05/19 22:26





  Cardizem 100mg In Ns  IV   5 mg/hr





  .Q20H PRN   5 mls/hr





  TITRATE PER MD ORDER   Administration





     





  Protocol   





  5 MG/HR   


 


Linezolid  600 mg in 300 mls @ 200 mls/hr  02/05/19 22:00  02/06/19 09:52





  Zyvox 600mg/300ml D5w  IVPB  02/12/19 22:01  200 mls/hr





  Q12 TREV   Administration





     





     





  Protocol   





     


 


Doxycycline Hyclate 100 mg/  100 mls @ 100 mls/hr  02/05/19 13:00  02/06/19 

09:52





  Sodium Chloride  IVPB   100 mls/hr





  Q12 TREV   Administration





     





     





  Protocol   





     


 


Heparin Sodium/Sodium Chloride  25,000 units in 250 mls @ 20.02 mls/hr  02/05/19

 13:13  02/06/19 03:55





  Heparin 39876 Units/250ml 1/2 Normal Saline  IV   8.25 units/kg/hr





  .[id number] PRN   20.02 mls/hr





  ADJUST RATE PER PROTOCOL   Administration





     





  Protocol   





  8.25 UNITS/KG/HR   


 


Methylprednisolone  20 mg  02/05/19 13:00  02/06/19 05:29





  Solu-Medrol  IVP   20 mg





  Q8H TREV   Administration





     





     





     





     


 


Mupirocin  0 gm  02/06/19 10:00  





  Bactroban Ointment  TOP   





  BID TREV   





     





     





     





     


 


Ondansetron HCl  4 mg  02/05/19 06:19  





  Zofran Inj  IVP   





  Q4H PRN   





  Nausea/Vomiting   





     





     





     


 


Pantoprazole Sodium  40 mg  02/06/19 06:00  02/06/19 05:28





  Protonix Ec Tab  PO   40 mg





  0600 TREV   Administration





     





     





     





     














- Patient Studies


Lab Studies: 


                              Microbiology Studies











 02/05/19 11:10 MRSA Culture (Admit) - Final





 Naris    MRSA NOT DETECTED








                                   Lab Studies











  02/06/19 02/06/19 02/06/19 Range/Units





  08:30 06:00 06:00 


 


WBC    8.7  D  (4.5-11.0)  10^3/uL


 


RBC    4.33  (3.5-6.1)  10^6/uL


 


Hgb    8.5 L  (14.0-18.0)  g/dL


 


Hct    33.1 L  (42.0-52.0)  %


 


MCV    76.4 L  (80.0-105.0)  fl


 


MCH    19.6 L  (25.0-35.0)  pg


 


MCHC    25.7 L  (31.0-37.0)  g/dl


 


RDW    20.1 H  (11.5-14.5)  %


 


Plt Count    241  (120.0-450.0)  10^3/uL


 


MPV    9.7  (7.0-11.0)  fl


 


Neut % (Auto)    89.5 H  (50.0-68.0)  %


 


Lymph % (Auto)    7.8 L  (22.0-35.0)  %


 


Mono % (Auto)    2.5  (1.0-6.0)  %


 


Eos % (Auto)    0.1 L  (1.5-5.0)  %


 


Baso % (Auto)    0.1  (0.0-3.0)  %


 


Lymph # (Auto)    0.7 L  (1.2-3.4)  


 


Mono # (Auto)    0.2  (0.1-0.6)  


 


Eos # (Auto)    0.0  (0.0-0.7)  


 


Baso # (Auto)    0.01  (0.0-2.0)  K/mm3


 


Absolute Neuts (auto)    7.77 H  (1.4-6.5)  


 


APTT     (26.9-38.3)  Seconds


 


pCO2  85 H*    (35-45)  mm/Hg


 


pO2  118.0 H    ()  mm/Hg


 


HCO3  34.8 H    (21-28)  mmol/L


 


ABG pH  7.22 L    (7.35-7.45)  


 


ABG Total CO2  37.4 H    (22-28)  mmol.L


 


ABG O2 Saturation  98.5 H    (95-98)  %


 


ABG O2 Content  11.8 L    (15-23)  ML/dl


 


ABG Base Excess  5.6 H    (-2.0-3.0)  mmol/L


 


ABG Hemoglobin  8.6 L    (11.7-17.4)  g/dL


 


ABG Carboxyhemoglobin  2.0 H    (0.5-1.5)  %


 


POC ABG HHb (Measured)  1.5    (0-5)  %


 


ABG Methemoglobin  1.1    (0.0-3.0)  %


 


ABG O2 Capacity  12.0 L    (16-24)  mL/dl


 


ABG Potassium     (3.6-5.2)  mmol/L


 


Hgb O2 Saturation  95.4    (95.0-98.0)  %


 


Sodium   138   (132-148)  mmol/L


 


Chloride   97 L   ()  mmol/L


 


Glucose     ()  mg/dl


 


Lactate     (0.7-2.1)  mmol/L


 


FiO2  50.0    %


 


Inspiratory BiPAP     


 


Crit Value Called To  j.dr Ksenia    


 


Crit Value Called By  lin Lino    


 


Blood Gas Notified Time  843    


 


Potassium   5.1 H   (3.6-5.0)  mmol/L


 


Carbon Dioxide   35 H   (21-33)  mmol/L


 


Anion Gap   11   (10-20)  


 


BUN   26 H   (7-21)  mg/dL


 


Creatinine   0.9   (0.8-1.5)  mg/dl


 


Est GFR (African Amer)   > 60   


 


Est GFR (Non-Af Amer)   > 60   


 


Random Glucose   110   ()  mg/dL


 


Hemoglobin A1c     (4.2-6.5)  %


 


Calcium   7.8 L   (8.4-10.5)  mg/dL


 


Phosphorus   5.8 H   (2.5-4.5)  mg/dL


 


Magnesium   1.8   (1.7-2.2)  mg/dL


 


Ferritin     ng/mL


 


Total Bilirubin   1.0   (0.2-1.3)  mg/dL


 


Direct Bilirubin   0.7 H   (0.0-0.4)  mg/dL


 


AST   57   (17-59)  U/L


 


ALT   21   (7-56)  U/L


 


Alkaline Phosphatase   35 L   ()  U/L


 


Total Creatine Kinase     ()  U/L


 


Troponin I     ng/mL


 


Total Protein   7.7   (5.8-8.3)  g/dL


 


Albumin   3.4   (3.0-4.8)  g/dL


 


Globulin   4.3   gm/dL


 


Albumin/Globulin Ratio   0.8 L   (1.1-1.8)  


 


Vitamin B12     (239-931)  pg/mL


 


25-OH Vitamin D Total     (30.0-100.0)  NG/ML


 


Folate     ng/mL


 


Arterial Blood Potassium     (3.6-5.2)  mmol/L


 


Crossmatch     














  02/06/19 02/06/19 02/05/19 Range/Units





  03:18 02:40 23:00 


 


WBC     (4.5-11.0)  10^3/uL


 


RBC     (3.5-6.1)  10^6/uL


 


Hgb     (14.0-18.0)  g/dL


 


Hct     (42.0-52.0)  %


 


MCV     (80.0-105.0)  fl


 


MCH     (25.0-35.0)  pg


 


MCHC     (31.0-37.0)  g/dl


 


RDW     (11.5-14.5)  %


 


Plt Count     (120.0-450.0)  10^3/uL


 


MPV     (7.0-11.0)  fl


 


Neut % (Auto)     (50.0-68.0)  %


 


Lymph % (Auto)     (22.0-35.0)  %


 


Mono % (Auto)     (1.0-6.0)  %


 


Eos % (Auto)     (1.5-5.0)  %


 


Baso % (Auto)     (0.0-3.0)  %


 


Lymph # (Auto)     (1.2-3.4)  


 


Mono # (Auto)     (0.1-0.6)  


 


Eos # (Auto)     (0.0-0.7)  


 


Baso # (Auto)     (0.0-2.0)  K/mm3


 


Absolute Neuts (auto)     (1.4-6.5)  


 


APTT   76.8 H   (26.9-38.3)  Seconds


 


pCO2  99 H*   78 H*  (35-45)  mm/Hg


 


pO2  339.0 H   105.0 H  ()  mm/Hg


 


HCO3  36.1 H   31.9 H  (21-28)  mmol/L


 


ABG pH  7.17 L*   7.22 L  (7.35-7.45)  


 


ABG Total CO2  39.1 H   34.3 H  (22-28)  mmol.L


 


ABG O2 Saturation  99.1 H   97.9  (95-98)  %


 


ABG O2 Content  12.9 L   12.1 L  (15-23)  ML/dl


 


ABG Base Excess  5.8 H   3.0  (-2.0-3.0)  mmol/L


 


ABG Hemoglobin  8.8 L   8.9 L  (11.7-17.4)  g/dL


 


ABG Carboxyhemoglobin  1.9 H   2.0 H  (0.5-1.5)  %


 


POC ABG HHb (Measured)  0.9   2.0  (0-5)  %


 


ABG Methemoglobin  0.4   0.7  (0.0-3.0)  %


 


ABG O2 Capacity  13.0 L   12.4 L  (16-24)  mL/dl


 


ABG Potassium     (3.6-5.2)  mmol/L


 


Hgb O2 Saturation  96.8   95.4  (95.0-98.0)  %


 


Sodium     (132-148)  mmol/L


 


Chloride     ()  mmol/L


 


Glucose     ()  mg/dl


 


Lactate     (0.7-2.1)  mmol/L


 


FiO2  100.0   100.0  %


 


Inspiratory BiPAP     


 


Crit Value Called To  suma hopson Dr.  


 


Crit Value Called By  Manpreet lott   Th0mas smith  


 


Blood Gas Notified Time  335   2330  


 


Potassium     (3.6-5.0)  mmol/L


 


Carbon Dioxide     (21-33)  mmol/L


 


Anion Gap     (10-20)  


 


BUN     (7-21)  mg/dL


 


Creatinine     (0.8-1.5)  mg/dl


 


Est GFR (African Amer)     


 


Est GFR (Non-Af Amer)     


 


Random Glucose     ()  mg/dL


 


Hemoglobin A1c     (4.2-6.5)  %


 


Calcium     (8.4-10.5)  mg/dL


 


Phosphorus     (2.5-4.5)  mg/dL


 


Magnesium     (1.7-2.2)  mg/dL


 


Ferritin     ng/mL


 


Total Bilirubin     (0.2-1.3)  mg/dL


 


Direct Bilirubin     (0.0-0.4)  mg/dL


 


AST     (17-59)  U/L


 


ALT     (7-56)  U/L


 


Alkaline Phosphatase     ()  U/L


 


Total Creatine Kinase     ()  U/L


 


Troponin I     ng/mL


 


Total Protein     (5.8-8.3)  g/dL


 


Albumin     (3.0-4.8)  g/dL


 


Globulin     gm/dL


 


Albumin/Globulin Ratio     (1.1-1.8)  


 


Vitamin B12     (239-931)  pg/mL


 


25-OH Vitamin D Total     (30.0-100.0)  NG/ML


 


Folate     ng/mL


 


Arterial Blood Potassium     (3.6-5.2)  mmol/L


 


Crossmatch     














  02/05/19 02/05/19 02/05/19 Range/Units





  20:20 17:30 17:30 


 


WBC   11.0   (4.5-11.0)  10^3/uL


 


RBC   4.56   (3.5-6.1)  10^6/uL


 


Hgb   8.9 L   (14.0-18.0)  g/dL


 


Hct   34.5 L   (42.0-52.0)  %


 


MCV   75.7 L D   (80.0-105.0)  fl


 


MCH   19.5 L   (25.0-35.0)  pg


 


MCHC   25.8 L   (31.0-37.0)  g/dl


 


RDW   20.2 H   (11.5-14.5)  %


 


Plt Count   241   (120.0-450.0)  10^3/uL


 


MPV   9.1   (7.0-11.0)  fl


 


Neut % (Auto)     (50.0-68.0)  %


 


Lymph % (Auto)     (22.0-35.0)  %


 


Mono % (Auto)     (1.0-6.0)  %


 


Eos % (Auto)     (1.5-5.0)  %


 


Baso % (Auto)     (0.0-3.0)  %


 


Lymph # (Auto)     (1.2-3.4)  


 


Mono # (Auto)     (0.1-0.6)  


 


Eos # (Auto)     (0.0-0.7)  


 


Baso # (Auto)     (0.0-2.0)  K/mm3


 


Absolute Neuts (auto)     (1.4-6.5)  


 


APTT  55.1 H    (26.9-38.3)  Seconds


 


pCO2     (35-45)  mm/Hg


 


pO2     ()  mm/Hg


 


HCO3     (21-28)  mmol/L


 


ABG pH     (7.35-7.45)  


 


ABG Total CO2     (22-28)  mmol.L


 


ABG O2 Saturation     (95-98)  %


 


ABG O2 Content     (15-23)  ML/dl


 


ABG Base Excess     (-2.0-3.0)  mmol/L


 


ABG Hemoglobin     (11.7-17.4)  g/dL


 


ABG Carboxyhemoglobin     (0.5-1.5)  %


 


POC ABG HHb (Measured)     (0-5)  %


 


ABG Methemoglobin     (0.0-3.0)  %


 


ABG O2 Capacity     (16-24)  mL/dl


 


ABG Potassium     (3.6-5.2)  mmol/L


 


Hgb O2 Saturation     (95.0-98.0)  %


 


Sodium     (132-148)  mmol/L


 


Chloride     ()  mmol/L


 


Glucose     ()  mg/dl


 


Lactate     (0.7-2.1)  mmol/L


 


FiO2     %


 


Inspiratory BiPAP     


 


Crit Value Called To     


 


Crit Value Called By     


 


Blood Gas Notified Time     


 


Potassium     (3.6-5.0)  mmol/L


 


Carbon Dioxide     (21-33)  mmol/L


 


Anion Gap     (10-20)  


 


BUN     (7-21)  mg/dL


 


Creatinine     (0.8-1.5)  mg/dl


 


Est GFR (African Amer)     


 


Est GFR (Non-Af Amer)     


 


Random Glucose     ()  mg/dL


 


Hemoglobin A1c     (4.2-6.5)  %


 


Calcium     (8.4-10.5)  mg/dL


 


Phosphorus     (2.5-4.5)  mg/dL


 


Magnesium     (1.7-2.2)  mg/dL


 


Ferritin     ng/mL


 


Total Bilirubin     (0.2-1.3)  mg/dL


 


Direct Bilirubin     (0.0-0.4)  mg/dL


 


AST     (17-59)  U/L


 


ALT     (7-56)  U/L


 


Alkaline Phosphatase     ()  U/L


 


Total Creatine Kinase    67  ()  U/L


 


Troponin I    0.02  ng/mL


 


Total Protein     (5.8-8.3)  g/dL


 


Albumin     (3.0-4.8)  g/dL


 


Globulin     gm/dL


 


Albumin/Globulin Ratio     (1.1-1.8)  


 


Vitamin B12     (239-931)  pg/mL


 


25-OH Vitamin D Total     (30.0-100.0)  NG/ML


 


Folate     ng/mL


 


Arterial Blood Potassium     (3.6-5.2)  mmol/L


 


Crossmatch     














  02/05/19 02/05/19 02/05/19 Range/Units





  15:10 11:00 10:10 


 


WBC     (4.5-11.0)  10^3/uL


 


RBC     (3.5-6.1)  10^6/uL


 


Hgb     (14.0-18.0)  g/dL


 


Hct     (42.0-52.0)  %


 


MCV     (80.0-105.0)  fl


 


MCH     (25.0-35.0)  pg


 


MCHC     (31.0-37.0)  g/dl


 


RDW     (11.5-14.5)  %


 


Plt Count     (120.0-450.0)  10^3/uL


 


MPV     (7.0-11.0)  fl


 


Neut % (Auto)     (50.0-68.0)  %


 


Lymph % (Auto)     (22.0-35.0)  %


 


Mono % (Auto)     (1.0-6.0)  %


 


Eos % (Auto)     (1.5-5.0)  %


 


Baso % (Auto)     (0.0-3.0)  %


 


Lymph # (Auto)     (1.2-3.4)  


 


Mono # (Auto)     (0.1-0.6)  


 


Eos # (Auto)     (0.0-0.7)  


 


Baso # (Auto)     (0.0-2.0)  K/mm3


 


Absolute Neuts (auto)     (1.4-6.5)  


 


APTT     (26.9-38.3)  Seconds


 


pCO2  76 H*    (35-45)  mm/Hg


 


pO2  107.0 H    ()  mm/Hg


 


HCO3  32.6 H    (21-28)  mmol/L


 


ABG pH  7.24 L    (7.35-7.45)  


 


ABG Total CO2  34.9 H    (22-28)  mmol.L


 


ABG O2 Saturation  98.5 H    (95-98)  %


 


ABG O2 Content     (15-23)  ML/dl


 


ABG Base Excess  2.8    (-2.0-3.0)  mmol/L


 


ABG Hemoglobin     (11.7-17.4)  g/dL


 


ABG Carboxyhemoglobin     (0.5-1.5)  %


 


POC ABG HHb (Measured)     (0-5)  %


 


ABG Methemoglobin     (0.0-3.0)  %


 


ABG O2 Capacity     (16-24)  mL/dl


 


ABG Potassium  4.2    (3.6-5.2)  mmol/L


 


Hgb O2 Saturation     (95.0-98.0)  %


 


Sodium  137.0    (132-148)  mmol/L


 


Chloride  103.0    ()  mmol/L


 


Glucose  90    ()  mg/dl


 


Lactate  0.6 L    (0.7-2.1)  mmol/L


 


FiO2  40.0    %


 


Inspiratory BiPAP  22    


 


Crit Value Called To  narciso Boucher    


 


Crit Value Called By  lin Lino    


 


Blood Gas Notified Time  1314    


 


Potassium     (3.6-5.0)  mmol/L


 


Carbon Dioxide     (21-33)  mmol/L


 


Anion Gap     (10-20)  


 


BUN     (7-21)  mg/dL


 


Creatinine     (0.8-1.5)  mg/dl


 


Est GFR (African Amer)     


 


Est GFR (Non-Af Amer)     


 


Random Glucose     ()  mg/dL


 


Hemoglobin A1c     (4.2-6.5)  %


 


Calcium     (8.4-10.5)  mg/dL


 


Phosphorus     (2.5-4.5)  mg/dL


 


Magnesium     (1.7-2.2)  mg/dL


 


Ferritin     ng/mL


 


Total Bilirubin     (0.2-1.3)  mg/dL


 


Direct Bilirubin     (0.0-0.4)  mg/dL


 


AST     (17-59)  U/L


 


ALT     (7-56)  U/L


 


Alkaline Phosphatase     ()  U/L


 


Total Creatine Kinase     ()  U/L


 


Troponin I     ng/mL


 


Total Protein     (5.8-8.3)  g/dL


 


Albumin     (3.0-4.8)  g/dL


 


Globulin     gm/dL


 


Albumin/Globulin Ratio     (1.1-1.8)  


 


Vitamin B12     (239-931)  pg/mL


 


25-OH Vitamin D Total   5 L  < 12.8 L  (30.0-100.0)  NG/ML


 


Folate     ng/mL


 


Arterial Blood Potassium  4.2    (3.6-5.2)  mmol/L


 


Crossmatch     














  02/05/19 02/05/19 02/05/19 Range/Units





  06:00 06:00 06:00 


 


WBC     (4.5-11.0)  10^3/uL


 


RBC     (3.5-6.1)  10^6/uL


 


Hgb     (14.0-18.0)  g/dL


 


Hct     (42.0-52.0)  %


 


MCV     (80.0-105.0)  fl


 


MCH     (25.0-35.0)  pg


 


MCHC     (31.0-37.0)  g/dl


 


RDW     (11.5-14.5)  %


 


Plt Count     (120.0-450.0)  10^3/uL


 


MPV     (7.0-11.0)  fl


 


Neut % (Auto)     (50.0-68.0)  %


 


Lymph % (Auto)     (22.0-35.0)  %


 


Mono % (Auto)     (1.0-6.0)  %


 


Eos % (Auto)     (1.5-5.0)  %


 


Baso % (Auto)     (0.0-3.0)  %


 


Lymph # (Auto)     (1.2-3.4)  


 


Mono # (Auto)     (0.1-0.6)  


 


Eos # (Auto)     (0.0-0.7)  


 


Baso # (Auto)     (0.0-2.0)  K/mm3


 


Absolute Neuts (auto)     (1.4-6.5)  


 


APTT     (26.9-38.3)  Seconds


 


pCO2     (35-45)  mm/Hg


 


pO2     ()  mm/Hg


 


HCO3     (21-28)  mmol/L


 


ABG pH     (7.35-7.45)  


 


ABG Total CO2     (22-28)  mmol.L


 


ABG O2 Saturation     (95-98)  %


 


ABG O2 Content     (15-23)  ML/dl


 


ABG Base Excess     (-2.0-3.0)  mmol/L


 


ABG Hemoglobin     (11.7-17.4)  g/dL


 


ABG Carboxyhemoglobin     (0.5-1.5)  %


 


POC ABG HHb (Measured)     (0-5)  %


 


ABG Methemoglobin     (0.0-3.0)  %


 


ABG O2 Capacity     (16-24)  mL/dl


 


ABG Potassium     (3.6-5.2)  mmol/L


 


Hgb O2 Saturation     (95.0-98.0)  %


 


Sodium     (132-148)  mmol/L


 


Chloride     ()  mmol/L


 


Glucose     ()  mg/dl


 


Lactate     (0.7-2.1)  mmol/L


 


FiO2     %


 


Inspiratory BiPAP     


 


Crit Value Called To     


 


Crit Value Called By     


 


Blood Gas Notified Time     


 


Potassium     (3.6-5.0)  mmol/L


 


Carbon Dioxide     (21-33)  mmol/L


 


Anion Gap     (10-20)  


 


BUN     (7-21)  mg/dL


 


Creatinine     (0.8-1.5)  mg/dl


 


Est GFR (African Amer)     


 


Est GFR (Non-Af Amer)     


 


Random Glucose     ()  mg/dL


 


Hemoglobin A1c  6.2    (4.2-6.5)  %


 


Calcium     (8.4-10.5)  mg/dL


 


Phosphorus     (2.5-4.5)  mg/dL


 


Magnesium     (1.7-2.2)  mg/dL


 


Ferritin   20.2   ng/mL


 


Total Bilirubin     (0.2-1.3)  mg/dL


 


Direct Bilirubin     (0.0-0.4)  mg/dL


 


AST     (17-59)  U/L


 


ALT     (7-56)  U/L


 


Alkaline Phosphatase     ()  U/L


 


Total Creatine Kinase     ()  U/L


 


Troponin I     ng/mL


 


Total Protein     (5.8-8.3)  g/dL


 


Albumin     (3.0-4.8)  g/dL


 


Globulin     gm/dL


 


Albumin/Globulin Ratio     (1.1-1.8)  


 


Vitamin B12   797   (239-931)  pg/mL


 


25-OH Vitamin D Total     (30.0-100.0)  NG/ML


 


Folate   7.7   ng/mL


 


Arterial Blood Potassium     (3.6-5.2)  mmol/L


 


Crossmatch    See Detail  








                         Laboratory Results - last 24 hr











  02/05/19 02/05/19 02/05/19





  06:00 06:00 06:00


 


WBC   


 


RBC   


 


Hgb   


 


Hct   


 


MCV   


 


MCH   


 


MCHC   


 


RDW   


 


Plt Count   


 


MPV   


 


Neut % (Auto)   


 


Lymph % (Auto)   


 


Mono % (Auto)   


 


Eos % (Auto)   


 


Baso % (Auto)   


 


Lymph # (Auto)   


 


Mono # (Auto)   


 


Eos # (Auto)   


 


Baso # (Auto)   


 


Absolute Neuts (auto)   


 


APTT   


 


pCO2   


 


pO2   


 


HCO3   


 


ABG pH   


 


ABG Total CO2   


 


ABG O2 Saturation   


 


ABG O2 Content   


 


ABG Base Excess   


 


ABG Hemoglobin   


 


ABG Carboxyhemoglobin   


 


POC ABG HHb (Measured)   


 


ABG Methemoglobin   


 


ABG O2 Capacity   


 


ABG Potassium   


 


Hgb O2 Saturation   


 


Sodium   


 


Chloride   


 


Glucose   


 


Lactate   


 


FiO2   


 


Inspiratory BiPAP   


 


Crit Value Called To   


 


Crit Value Called By   


 


Blood Gas Notified Time   


 


Potassium   


 


Carbon Dioxide   


 


Anion Gap   


 


BUN   


 


Creatinine   


 


Est GFR ( Amer)   


 


Est GFR (Non-Af Amer)   


 


Random Glucose   


 


Hemoglobin A1c    6.2


 


Calcium   


 


Phosphorus   


 


Magnesium   


 


Ferritin   20.2 


 


Total Bilirubin   


 


Direct Bilirubin   


 


AST   


 


ALT   


 


Alkaline Phosphatase   


 


Total Creatine Kinase   


 


Troponin I   


 


Total Protein   


 


Albumin   


 


Globulin   


 


Albumin/Globulin Ratio   


 


Vitamin B12   797 


 


25-OH Vitamin D Total   


 


Folate   7.7 


 


Arterial Blood Potassium   


 


Crossmatch  See Detail  














  02/05/19 02/05/19 02/05/19





  10:10 11:00 15:10


 


WBC   


 


RBC   


 


Hgb   


 


Hct   


 


MCV   


 


MCH   


 


MCHC   


 


RDW   


 


Plt Count   


 


MPV   


 


Neut % (Auto)   


 


Lymph % (Auto)   


 


Mono % (Auto)   


 


Eos % (Auto)   


 


Baso % (Auto)   


 


Lymph # (Auto)   


 


Mono # (Auto)   


 


Eos # (Auto)   


 


Baso # (Auto)   


 


Absolute Neuts (auto)   


 


APTT   


 


pCO2    76 H*


 


pO2    107.0 H


 


HCO3    32.6 H


 


ABG pH    7.24 L


 


ABG Total CO2    34.9 H


 


ABG O2 Saturation    98.5 H


 


ABG O2 Content   


 


ABG Base Excess    2.8


 


ABG Hemoglobin   


 


ABG Carboxyhemoglobin   


 


POC ABG HHb (Measured)   


 


ABG Methemoglobin   


 


ABG O2 Capacity   


 


ABG Potassium    4.2


 


Hgb O2 Saturation   


 


Sodium    137.0


 


Chloride    103.0


 


Glucose    90


 


Lactate    0.6 L


 


FiO2    40.0


 


Inspiratory BiPAP    22


 


Crit Value Called To    williams Boucher.


 


Crit Value Called By    lin Lino


 


Blood Gas Notified Time    1314


 


Potassium   


 


Carbon Dioxide   


 


Anion Gap   


 


BUN   


 


Creatinine   


 


Est GFR ( Amer)   


 


Est GFR (Non-Af Amer)   


 


Random Glucose   


 


Hemoglobin A1c   


 


Calcium   


 


Phosphorus   


 


Magnesium   


 


Ferritin   


 


Total Bilirubin   


 


Direct Bilirubin   


 


AST   


 


ALT   


 


Alkaline Phosphatase   


 


Total Creatine Kinase   


 


Troponin I   


 


Total Protein   


 


Albumin   


 


Globulin   


 


Albumin/Globulin Ratio   


 


Vitamin B12   


 


25-OH Vitamin D Total  < 12.8 L  5 L 


 


Folate   


 


Arterial Blood Potassium    4.2


 


Crossmatch   














  02/05/19 02/05/19 02/05/19





  17:30 17:30 20:20


 


WBC   11.0 


 


RBC   4.56 


 


Hgb   8.9 L 


 


Hct   34.5 L 


 


MCV   75.7 L D 


 


MCH   19.5 L 


 


MCHC   25.8 L 


 


RDW   20.2 H 


 


Plt Count   241 


 


MPV   9.1 


 


Neut % (Auto)   


 


Lymph % (Auto)   


 


Mono % (Auto)   


 


Eos % (Auto)   


 


Baso % (Auto)   


 


Lymph # (Auto)   


 


Mono # (Auto)   


 


Eos # (Auto)   


 


Baso # (Auto)   


 


Absolute Neuts (auto)   


 


APTT    55.1 H


 


pCO2   


 


pO2   


 


HCO3   


 


ABG pH   


 


ABG Total CO2   


 


ABG O2 Saturation   


 


ABG O2 Content   


 


ABG Base Excess   


 


ABG Hemoglobin   


 


ABG Carboxyhemoglobin   


 


POC ABG HHb (Measured)   


 


ABG Methemoglobin   


 


ABG O2 Capacity   


 


ABG Potassium   


 


Hgb O2 Saturation   


 


Sodium   


 


Chloride   


 


Glucose   


 


Lactate   


 


FiO2   


 


Inspiratory BiPAP   


 


Crit Value Called To   


 


Crit Value Called By   


 


Blood Gas Notified Time   


 


Potassium   


 


Carbon Dioxide   


 


Anion Gap   


 


BUN   


 


Creatinine   


 


Est GFR ( Amer)   


 


Est GFR (Non-Af Amer)   


 


Random Glucose   


 


Hemoglobin A1c   


 


Calcium   


 


Phosphorus   


 


Magnesium   


 


Ferritin   


 


Total Bilirubin   


 


Direct Bilirubin   


 


AST   


 


ALT   


 


Alkaline Phosphatase   


 


Total Creatine Kinase  67  


 


Troponin I  0.02  


 


Total Protein   


 


Albumin   


 


Globulin   


 


Albumin/Globulin Ratio   


 


Vitamin B12   


 


25-OH Vitamin D Total   


 


Folate   


 


Arterial Blood Potassium   


 


Crossmatch   














  02/05/19 02/06/19 02/06/19





  23:00 02:40 03:18


 


WBC   


 


RBC   


 


Hgb   


 


Hct   


 


MCV   


 


MCH   


 


MCHC   


 


RDW   


 


Plt Count   


 


MPV   


 


Neut % (Auto)   


 


Lymph % (Auto)   


 


Mono % (Auto)   


 


Eos % (Auto)   


 


Baso % (Auto)   


 


Lymph # (Auto)   


 


Mono # (Auto)   


 


Eos # (Auto)   


 


Baso # (Auto)   


 


Absolute Neuts (auto)   


 


APTT   76.8 H 


 


pCO2  78 H*   99 H*


 


pO2  105.0 H   339.0 H


 


HCO3  31.9 H   36.1 H


 


ABG pH  7.22 L   7.17 L*


 


ABG Total CO2  34.3 H   39.1 H


 


ABG O2 Saturation  97.9   99.1 H


 


ABG O2 Content  12.1 L   12.9 L


 


ABG Base Excess  3.0   5.8 H


 


ABG Hemoglobin  8.9 L   8.8 L


 


ABG Carboxyhemoglobin  2.0 H   1.9 H


 


POC ABG HHb (Measured)  2.0   0.9


 


ABG Methemoglobin  0.7   0.4


 


ABG O2 Capacity  12.4 L   13.0 L


 


ABG Potassium   


 


Hgb O2 Saturation  95.4   96.8


 


Sodium   


 


Chloride   


 


Glucose   


 


Lactate   


 


FiO2  100.0   100.0


 


Inspiratory BiPAP   


 


Crit Value Called To  suma Velazquez dr.


 


Crit Value Called By  Jennifer lott


 


Blood Gas Notified Time  4414   335


 


Potassium   


 


Carbon Dioxide   


 


Anion Gap   


 


BUN   


 


Creatinine   


 


Est GFR ( Amer)   


 


Est GFR (Non-Af Amer)   


 


Random Glucose   


 


Hemoglobin A1c   


 


Calcium   


 


Phosphorus   


 


Magnesium   


 


Ferritin   


 


Total Bilirubin   


 


Direct Bilirubin   


 


AST   


 


ALT   


 


Alkaline Phosphatase   


 


Total Creatine Kinase   


 


Troponin I   


 


Total Protein   


 


Albumin   


 


Globulin   


 


Albumin/Globulin Ratio   


 


Vitamin B12   


 


25-OH Vitamin D Total   


 


Folate   


 


Arterial Blood Potassium   


 


Crossmatch   














  02/06/19 02/06/19 02/06/19





  06:00 06:00 08:30


 


WBC  8.7  D  


 


RBC  4.33  


 


Hgb  8.5 L  


 


Hct  33.1 L  


 


MCV  76.4 L  


 


MCH  19.6 L  


 


MCHC  25.7 L  


 


RDW  20.1 H  


 


Plt Count  241  


 


MPV  9.7  


 


Neut % (Auto)  89.5 H  


 


Lymph % (Auto)  7.8 L  


 


Mono % (Auto)  2.5  


 


Eos % (Auto)  0.1 L  


 


Baso % (Auto)  0.1  


 


Lymph # (Auto)  0.7 L  


 


Mono # (Auto)  0.2  


 


Eos # (Auto)  0.0  


 


Baso # (Auto)  0.01  


 


Absolute Neuts (auto)  7.77 H  


 


APTT   


 


pCO2    85 H*


 


pO2    118.0 H


 


HCO3    34.8 H


 


ABG pH    7.22 L


 


ABG Total CO2    37.4 H


 


ABG O2 Saturation    98.5 H


 


ABG O2 Content    11.8 L


 


ABG Base Excess    5.6 H


 


ABG Hemoglobin    8.6 L


 


ABG Carboxyhemoglobin    2.0 H


 


POC ABG HHb (Measured)    1.5


 


ABG Methemoglobin    1.1


 


ABG O2 Capacity    12.0 L


 


ABG Potassium   


 


Hgb O2 Saturation    95.4


 


Sodium   138 


 


Chloride   97 L 


 


Glucose   


 


Lactate   


 


FiO2    50.0


 


Inspiratory BiPAP   


 


Crit Value Called To    j.dr Ksenia


 


Crit Value Called By    lin Lino


 


Blood Gas Notified Time    843


 


Potassium   5.1 H 


 


Carbon Dioxide   35 H 


 


Anion Gap   11 


 


BUN   26 H 


 


Creatinine   0.9 


 


Est GFR ( Amer)   > 60 


 


Est GFR (Non-Af Amer)   > 60 


 


Random Glucose   110 


 


Hemoglobin A1c   


 


Calcium   7.8 L 


 


Phosphorus   5.8 H 


 


Magnesium   1.8 


 


Ferritin   


 


Total Bilirubin   1.0 


 


Direct Bilirubin   0.7 H 


 


AST   57 


 


ALT   21 


 


Alkaline Phosphatase   35 L 


 


Total Creatine Kinase   


 


Troponin I   


 


Total Protein   7.7 


 


Albumin   3.4 


 


Globulin   4.3 


 


Albumin/Globulin Ratio   0.8 L 


 


Vitamin B12   


 


25-OH Vitamin D Total   


 


Folate   


 


Arterial Blood Potassium   


 


Crossmatch   











Radiology Impressions: 


                              Radiology Impressions





Extremity Ultrasound  02/05/19 02:29


IMPRESSION:


No sonographic evidence for deep venous thrombosis in the visualized 


segments of both lower extremities. 


 


Extremely limited study 


 


 








Lung Scan Nuclear Medicine  02/05/19 15:00


IMPRESSION:


Lowprobability ventilation perfusion scan for pulmonary embolism. 


 


 














Critical Care Progress Note





- Nutrition


Nutrition: 


                                    Nutrition











 Category Date Time Status


 


 Liquid Diet [DIET] Diets  02/06/19 Lunch Ordered














Attending/Attestation





- Attestation


I have personally seen and examined this patient.: Yes


I have fully participated in the care of the patient.: Yes


I have reviewed all pertinent clinical information: Yes


Notes (Text): 





02/06/19 12:14


The patient was seen and examined at the bedside. 


Patient care was discussed with resident 


Medical records, lab studies were reviewed and management issues were discussed 

and formulated.


Agree with above treatment plans as outlined in 's note with addition 

of the following:








Acute Respiratory Insufficiency \ Hypoxemia \ Hypercapnea \ LORIN - OHS \ Afib 

with RVR \ Cellulitis \ CHF \ ro COPD 








-hemodynamic monitoring to maintain MAP>65


-cardizem drip for rate control as per cardiology team


-o2 supplementation and Bipap PRN and HS to maintain Spo2 88-92 Pao2>60


-deescalate to NC this morning; tolerating well 


-monitor airway closely; pt awake and alert in AM, able to protect airway


-nebs PRN


-consider pulmonary eval as pt will need sleep study and pulmonary f\u outpt 

once improves


-continue Abx as per ID team and f\u cultures


-f\u Bun\Cr  and U\o; continue diuresis with lasix; monitor and replace e-lites


-PO diet  and aspiration precautions


-ISS and BGM monitoring


-anticoagulation with heparin; monitor PTT and for bleeding


-DVT \ PUD prophylaxis








CCM time 34min

## 2019-02-06 NOTE — PN
DATE:  02/06/2019



SUBJECTIVE:  The patient is still in ICU, bed 7.  The patient is seen lying

in the bed.  The patient is requiring CPAP for respiratory support. 

Overnight nurse's notes were reviewed.



PHYSICAL EXAMINATION:

VITAL SIGNS:  Overnight the last 12 hours vital signs, T-max 97.9. 

Telemetry shows atrial fibrillation, heart rate 78, 84; blood pressure

112/55; respirations 27-31; O2 sat 91%.

HEENT:  Head, normocephalic, atraumatic.  Examination shows pinkish pale

conjunctivae.  Anicteric sclerae.

NECK:  Short and supple.

CHEST:  Examination is morbidly obese.  Decreased breath sounds

bilaterally.  Occasional rhonchi upper lung field, anteriorly noted.

CARDIOVASCULAR:  S1, S2, irregular rhythm.  Questionable systolic murmur

left sternal border, right second intercostal space, left second

intercostal space.  Positive systolic murmur, left second intercostal

space, right second intercostal space, left sternal border.

ABDOMEN:  Super morbidly obese.  Unable to palpate any hepatosplenomegaly.

GENITALIA:  Male.

RECTAL:  Examination is deferred.

EXTREMITIES:  Lower extremity shows positive lymphedema of the lower

extremity with chronic area of chronic venous stasis of the lower extremity

lymphedema.

MUSCULOSKELETAL:  Examination shows an elevated body mass index of greater

than 70.  Body weight is more than 500 pounds.

NEUROLOGIC:  The patient is alert, awake, responsive.  Gait examination

could not be tested.



LABORATORY DATA:  On 02/06/2019, WBC 8.7, hemoglobin/hematocrit 8.5/33.1,

platelet 241.  ABG; pH of 7.17, pCO2 of 99, pO2 of 339, bicarb 36,

saturation 99%.  Sodium 138, potassium 5.1, chloride 97, CO2 of 35, BUN 26,

creatinine 0.9, glucose 110, calcium 7.8, phosphorus 5.8, magnesium 1.8.



DIAGNOSTIC DATA:  Echocardiogram shows ejection fraction 55%; left atrial

enlargement; moderate aortic regurgitation; mild tricuspid, pulmonic and

mitral regurgitation; and dilated aortic root.



IMPRESSION:

1.  New-onset atrial fibrillation with transient rapid ventricular

response.

2.  Severe respiratory acidosis with hypercarbia.

3.  Mild metabolic alkalosis.

4.  Possible acute-on-chronic hypercarbic-hypercapnic respiratory failure.

5.  Mild hyperkalemia.

6.  Hyperphosphatemia.

7.  Left atrial enlargement.

8.  Moderate aortic regurgitation.

9.  Mild mitral, pulmonic, tricuspid regurgitation.

10.  Dilated aortic root.

11.  Anemia.

12.  Status post packed red blood cell transfusion.

13.  Refractory anemia.

14.  Possible iron-deficiency anemia.

15.  Hypoxemia.

16.  Tachypnea.

17.  Super morbid obesity.

18.  Bilateral lower extremity lymphedema and bilateral lower extremity

venous stasis ulceration versus chronic bilateral lower extremity venous

stasis dermatitis.

19.  Severe gait dysfunction and deconditioning and bedridden status and

functional quadriplegia.

20.  Possible systolic congestive heart failure and with elevated pro

b-type natriuretic peptide.



PLAN:  At this time, the patient has been started on IV heparin, the

patient has been started on IV Cardizem drip.  The patient has been ordered

PRBC transfusion to keep hemoglobin around 10.  The patient is on BiPAP

support..  The patient has been ordered Kayexalate 30 g p.o. one dose

ordered.  Current consultation Cardiology, Podiatry, Infectious Disease and

gastroenterology and hematology evaluation has been requested.  At present,

the patient will be continued on the medications as per the MAR of today. 

Once the patient is subjectively, objectively stabilized with improved

pulmonary and cardiac status, the patient will be considered to be

transferred out of the ICU after clearance by Cardiology and other

subspecialty.



Time spent 35 minutes.



Prognosis guarded.  Condition critical.





__________________________________________

Varinder Mooney MD





DD:  02/06/2019 9:57:33

DT:  02/06/2019 10:00:35

Job # [id number]

## 2019-02-06 NOTE — CON
DATE OF CONSULTATION:  02/06/2019



REQUESTING PHYSICIAN:  Varinder Mooney MD



REASON FOR CONSULTATION:  I have been asked to see this morbidly obese

58-year-old male with a BMI of 72.6 with a history of hypoventilation

syndrome with chronic CO2 retention, chronic lymphedema of the legs with

associated cellulitis, chronic anemia, who comes to the hospital with

increasing shortness of breath.  He denied any chest pain, cough, fevers or

chills.  He has a history of MRSA infection of his right leg.  In the

emergency room, he was found to have new-onset atrial fibrillation and

significant CO2 retention with associated respiratory acidosis.  He denies

any rectal bleeding, melena, nausea, vomiting.



PAST MEDICAL HISTORY:  Notable for morbid obesity, chronic CO2 retention,

chronic lymphedema of the legs and cellulitis.



PAST SURGICAL HISTORY:  Notable for I and D of the right leg cellulitis and

right ankle fracture.



FAMILY HISTORY:  Noncontributory.



SOCIAL HISTORY:  Denies cigarette smoking or alcohol use.



REVIEW OF SYSTEMS:  A 14-point review of systems is notable for increasing

shortness of breath.



PHYSICAL EXAMINATION:

GENERAL:  Morbidly obese male weighing over 500 pounds, BMI is 72.6, in no

distress.  He has a BiPAP mask on.

VITAL SIGNS:  He is afebrile.  Blood pressure 118/59, heart rate of 88.

HEENT:  Reveal sclerae to be white.  Conjunctivae pale.

NECK:  Supple.

CHEST:  Distant breath sounds.

HEART:  Regular rate and rhythm.

ABDOMEN:  Obese, soft, nontender.

EXTREMITIES:  Chronic stasis changes with lymphedema of the legs.  His

right lower leg is covered with a dressing.



LABORATORY DATA:  White blood cell count 8.7, hemoglobin 8.5.  Chemistries

reveal BUN 26, creatinine 0.9, bicarb of 35, potassium 5.1.



IMPRESSION:  A 58-year-old male, morbidly obese, with CO2 retention,

admitted with shortness of breath with new-onset atrial fibrillation with

anemia.  This is a chronic anemia.  The patient's hemoglobin back in 2016

was 9.5.  His current hemoglobin is close to his baseline.  There is no

evidence of any overt GI bleeding.  I suspect again that this anemia is

secondary to chronic disease.



RECOMMENDATIONS:

1.  Continue PPI.

2.  Start anticoagulation as needed for atrial fibrillation.

3.  No plans for endoscopy at this time given high cardiopulmonary risk

given his morbid obesity and CO2 retention.





__________________________________________

Darrion Whittaker MD





DD:  02/06/2019 8:38:56

DT:  02/06/2019 8:40:39

Job # [id number]

## 2019-02-06 NOTE — CON
DATE OF CONSULTATION:  02/06/2019



HISTORY:  The patient is a 58-year-old male who presents with dyspnea.



The patient is morbidly obese and presents with chronic pedal edema.  His

history is poor and unable to give a coherent history.  However, he denies

chest pain.  He denies previous cardiac history.



He was found to be in atrial fibrillation.



Echocardiogram reveals good LV function with mild pulmonary hypertension.



Review of systems is noncontributory.



PHYSICAL EXAMINATION:

GENERAL:  The patient is morbidly obese, in no acute distress.

VITAL SIGNS:  Blood pressure is 112/55, heart rate is in the 70s, atrial

fibrillation.

NECK:  Negative JVD.

LUNGS:  Decreased breath sounds.

HEART:  S1, S2.

EXTREMITIES:  Marked edema in the lower extremities.



DIAGNOSTIC DATA:  EKG shows atrial fibrillation with nonspecific ST-T

changes.



LABORATORY DATA:  BUN and creatinine are unremarkable.  Troponins are

negative x1.  ProBNP is 2910.  The hemoglobin is 8.5.



IMPRESSION:

1.  Morbid obesity.

2.  Chronic pedal edema.

3.  Mild pulmonary hypertension.

4.  Dyspnea.

5.  Anemia.

6.  New-onset atrial fibrillation.



PLAN:  Given these findings, I agree with IV Lasix at this time.



His heart rate is well controlled.  Will need to consider long-term

anticoagulation once the source of his anemia is discovered.  Need to rule

out any bleed.





__________________________________________

Johnny Cardona MD





DD:  02/06/2019 8:07:59

DT:  02/06/2019 8:11:37

Job # [id number]

## 2019-02-06 NOTE — CP.PCM.PN
<Adam Ceballos - Last Filed: 02/06/19 08:49>





Subjective





- Date & Time of Evaluation


Date of Evaluation: 02/06/19


Time of Evaluation: 08:49





- Subjective


Subjective: 


Podiatry consult note for Dr. Huffman





57 y/o male seen and evaluated in the ICU due to right lateral leg wound. 

Patient unable to converse at this time due to increased shortness of breath.  

As per nursing, no overnight events





Objective





- Vital Signs/Intake and Output


Vital Signs (last 24 hours): 


                                        











Temp Pulse Resp BP Pulse Ox


 


 98 F   88   30 H  118/59 L  98 


 


 02/06/19 04:00  02/06/19 08:00  02/06/19 08:00  02/06/19 07:30  02/06/19 08:00








Intake and Output: 


                                        











 02/06/19 02/06/19





 06:59 18:59


 


Intake Total 950 


 


Output Total 1100 


 


Balance -150 














- Medications


Medications: 


                               Current Medications





Acetaminophen (Tylenol 325mg Tab)  650 mg PO Q6 PRN


   PRN Reason: TEMP>=99.5F


   Last Admin: 02/06/19 02:33 Dose:  650 mg


Acetaminophen (Tylenol 650 Mg Supp)  650 mg RC Q6H PRN


   PRN Reason: TEMP>=99.5F


Albuterol/Ipratropium (Duoneb 3 Mg/0.5 Mg (3 Ml) Ud)  3 ml IH Q4H [facility]


   Last Admin: 02/06/19 08:23 Dose:  3 ml


Atorvastatin Calcium (Lipitor)  40 mg PO DIN [facility]


   Last Admin: 02/05/19 17:46 Dose:  Not Given


Docusate Sodium (Colace)  100 mg PO TID [facility]


   Last Admin: 02/05/19 17:46 Dose:  Not Given


Furosemide (Lasix)  40 mg IVP Q8H [facility]


   Last Admin: 02/06/19 05:29 Dose:  40 mg


diltiaZEM IVPB 100mg in NS (Cardizem 100mg In Ns)  100 mls @ 5 mls/hr IV .Q20H 

PRN; Protocol


   PRN Reason: TITRATE PER MD ORDER


   Last Admin: 02/05/19 22:26 Dose:  5 mg/hr, 5 mls/hr


Linezolid (Zyvox 600mg/300ml D5w)  600 mg in 300 mls @ 200 mls/hr IVPB Q12 [facility]REV; 

Protocol


   Stop: 02/12/19 22:01


   Last Admin: 02/05/19 21:53 Dose:  200 mls/hr


Doxycycline Hyclate 100 mg/ (Sodium Chloride)  100 mls @ 100 mls/hr IVPB Q12 

TREV; Protocol


   Last Admin: 02/05/19 21:54 Dose:  100 mls/hr


Heparin Sodium/Sodium Chloride (Heparin 50464 Units/250ml 1/2 Normal Saline)  

25,000 units in 250 mls @ 20.02 mls/hr IV .[id number] PRN; Protocol


   PRN Reason: ADJUST RATE PER PROTOCOL


   Last Admin: 02/06/19 03:55 Dose:  8.25 units/kg/hr, 20.02 mls/hr


Methylprednisolone (Solu-Medrol)  20 mg IVP Q8H [facility]


   Last Admin: 02/06/19 05:29 Dose:  20 mg


Ondansetron HCl (Zofran Inj)  4 mg IVP Q4H PRN


   PRN Reason: Nausea/Vomiting


Pantoprazole Sodium (Protonix Ec Tab)  40 mg PO 0600 [facility]


   Last Admin: 02/06/19 05:28 Dose:  40 mg











- Labs


Labs: 


                                        





                                 02/06/19 06:00 





                                 02/06/19 06:00 





                                        











PT  18.5 SECONDS (9.4-12.5)  H  02/05/19  03:10    


 


INR  1.67   02/05/19  03:10    


 


APTT  76.8 Seconds (26.9-38.3)  H  02/06/19  02:40    














- Constitutional


Appears: Well, Non-toxic, In Acute Distress





- Head Exam


Head Exam: ATRAUMATIC, NORMOCEPHALIC





- Extremities Exam


Additional comments: 


Bilateral Lower Extremity Exam





VASC: DP and PT non-palpable secondary to significant lymphedema, TG within 

normal limits





DERM





RIGHT: superficial wounds noted to the lateral aspect of the right leg and to 

the posterior thigh, 100% granular base, no probe to bone, no malodor, minimal 

drainage, no signs of infection noted





LEFT: small superficial wound noted to the medial aspect of the left leg, fibro-

granular base, no probe to bone, no malodor, minimal drainage, no signs of 

infection noted, significant lymphedema noted bilaterally, 





NEURO: unable to assess, patient responding to pain stimuli





ORTHO: pain with movement and range of motion, pain on palpation to wounds








- Neurological Exam


Neurological Exam: Alert, Awake





- Psychiatric Exam


Psychiatric exam: Normal Affect, Normal Mood





Assessment and Plan





- Assessment and Plan (Free Text)


Assessment: 





57 y/o male patient seen with superficial wounds to bilateral lower extremity, 

non-infected


Plan: 


Patient seen and evaluated with Dr. Huffman


Plan discussed with attending


Chart, labs and vitals were reviewed, afebrile, absent leukocytosis


Wound cleansed with saline and dressed with maxorb, optifoam, and ACE


Ordered bactroban for wounds


Patient stable from podiatry standpoint, no intervention required


Podiatry will continue to follow











<Grzegorz Huffman - Last Filed: 02/06/19 09:51>





Objective





- Vital Signs/Intake and Output


Vital Signs (last 24 hours): 


                                        











Temp Pulse Resp BP Pulse Ox


 


 98 F   88   30 H  118/59 L  98 


 


 02/06/19 04:00  02/06/19 08:00  02/06/19 08:00  02/06/19 07:30  02/06/19 08:00








Intake and Output: 


                                        











 02/06/19 02/06/19





 06:59 18:59


 


Intake Total 950 


 


Output Total 1100 


 


Balance -150 














- Medications


Medications: 


                               Current Medications





Acetaminophen (Tylenol 325mg Tab)  650 mg PO Q6 PRN


   PRN Reason: TEMP>=99.5F


   Last Admin: 02/06/19 02:33 Dose:  650 mg


Acetaminophen (Tylenol 650 Mg Supp)  650 mg RC Q6H PRN


   PRN Reason: TEMP>=99.5F


Albuterol/Ipratropium (Duoneb 3 Mg/0.5 Mg (3 Ml) Ud)  3 ml IH Q4H [facility]


   Last Admin: 02/06/19 08:23 Dose:  3 ml


Atorvastatin Calcium (Lipitor)  40 mg PO DIN [facility]


   Last Admin: 02/05/19 17:46 Dose:  Not Given


Docusate Sodium (Colace)  100 mg PO TID [facility]


   Last Admin: 02/05/19 17:46 Dose:  Not Given


Furosemide (Lasix)  40 mg IVP Q8H [facility]


   Last Admin: 02/06/19 05:29 Dose:  40 mg


diltiaZEM IVPB 100mg in NS (Cardizem 100mg In Ns)  100 mls @ 5 mls/hr IV .Q20H 

PRN; Protocol


   PRN Reason: TITRATE PER MD ORDER


   Last Admin: 02/05/19 22:26 Dose:  5 mg/hr, 5 mls/hr


Linezolid (Zyvox 600mg/300ml D5w)  600 mg in 300 mls @ 200 mls/hr IVPB Q12 TREV; 

Protocol


   Stop: 02/12/19 22:01


   Last Admin: 02/05/19 21:53 Dose:  200 mls/hr


Doxycycline Hyclate 100 mg/ (Sodium Chloride)  100 mls @ 100 mls/hr IVPB Q12 

TREV; Protocol


   Last Admin: 02/05/19 21:54 Dose:  100 mls/hr


Heparin Sodium/Sodium Chloride (Heparin 14510 Units/250ml 1/2 Normal Saline)  25

,000 units in 250 mls @ 20.02 mls/hr IV .[id number] PRN; Protocol


   PRN Reason: ADJUST RATE PER PROTOCOL


   Last Admin: 02/06/19 03:55 Dose:  8.25 units/kg/hr, 20.02 mls/hr


Methylprednisolone (Solu-Medrol)  20 mg IVP Q8H [facility]


   Last Admin: 02/06/19 05:29 Dose:  20 mg


Mupirocin (Bactroban Ointment)  0 gm TOP BID TREV


Ondansetron HCl (Zofran Inj)  4 mg IVP Q4H PRN


   PRN Reason: Nausea/Vomiting


Pantoprazole Sodium (Protonix Ec Tab)  40 mg PO 0600 [facility]


   Last Admin: 02/06/19 05:28 Dose:  40 mg











- Labs


Labs: 


                                        





                                 02/06/19 06:00 





                                 02/06/19 06:00 





                                        











PT  18.5 SECONDS (9.4-12.5)  H  02/05/19  03:10    


 


INR  1.67   02/05/19  03:10    


 


APTT  76.8 Seconds (26.9-38.3)  H  02/06/19  02:40    














Attending/Attestation





- Attestation


I have personally seen and examined this patient.: Yes


I have fully participated in the care of the patient.: Yes


I have reviewed all pertinent clinical information, including history, physical 

exam and plan: Yes

## 2019-02-06 NOTE — CP.PCM.PCO
Physician Communication Note





- Physician Communication Note


Physician Communication Note: pt.morbid obese,w.LORIN admit w.chf,afib.on card 

drip, desated 88%, on bipap

## 2019-02-06 NOTE — CP.PCM.PN
<Tacos Murphy - Last Filed: 02/06/19 13:40>





Subjective





- Date & Time of Evaluation


Date of Evaluation: 02/06/19


Time of Evaluation: 07:00





- Subjective


Subjective: 





ID Progress Note 





Patient seen and examined. Patient more alert today. No fevers overnight. 





Objective





- Vital Signs/Intake and Output


Vital Signs (last 24 hours): 


                                        











Temp Pulse Resp BP Pulse Ox


 


 98 F   88   30 H  118/59 L  98 


 


 02/06/19 04:00  02/06/19 08:25  02/06/19 08:00  02/06/19 07:30  02/06/19 08:00








Intake and Output: 


                                        











 02/06/19 02/06/19





 06:59 18:59


 


Intake Total 950 


 


Output Total 1100 


 


Balance -150 














- Medications


Medications: 


                               Current Medications





Acetaminophen (Tylenol 325mg Tab)  650 mg PO Q6 PRN


   PRN Reason: TEMP>=99.5F


   Last Admin: 02/06/19 02:33 Dose:  650 mg


Acetaminophen (Tylenol 650 Mg Supp)  650 mg RC Q6H PRN


   PRN Reason: TEMP>=99.5F


Albuterol/Ipratropium (Duoneb 3 Mg/0.5 Mg (3 Ml) Ud)  3 ml IH [id number] [facility]


Atorvastatin Calcium (Lipitor)  40 mg PO DIN [facility]


   Last Admin: 02/05/19 17:46 Dose:  Not Given


Docusate Sodium (Colace)  100 mg PO TID [facility]


   Last Admin: 02/06/19 09:51 Dose:  100 mg


Furosemide (Lasix)  40 mg IVP Q8H [facility]


   Last Admin: 02/06/19 05:29 Dose:  40 mg


diltiaZEM IVPB 100mg in NS (Cardizem 100mg In Ns)  100 mls @ 5 mls/hr IV .Q20H 

PRN; Protocol


   PRN Reason: TITRATE PER MD ORDER


   Last Admin: 02/05/19 22:26 Dose:  5 mg/hr, 5 mls/hr


Linezolid (Zyvox 600mg/300ml D5w)  600 mg in 300 mls @ 200 mls/hr IVPB Q12 T[facility]V; 

Protocol


   Stop: 02/12/19 22:01


   Last Admin: 02/06/19 09:52 Dose:  200 mls/hr


Doxycycline Hyclate 100 mg/ (Sodium Chloride)  100 mls @ 100 mls/hr IVPB Q12 S

CH; Protocol


   Last Admin: 02/06/19 09:52 Dose:  100 mls/hr


Heparin Sodium/Sodium Chloride (Heparin 33015 Units/250ml 1/2 Normal Saline)  

25,000 units in 250 mls @ 20.02 mls/hr IV .[id number] PRN; Protocol


   PRN Reason: ADJUST RATE PER PROTOCOL


   Last Admin: 02/06/19 03:55 Dose:  8.25 units/kg/hr, 20.02 mls/hr


Methylprednisolone (Solu-Medrol)  20 mg IVP Q8H [facility]


   Last Admin: 02/06/19 05:29 Dose:  20 mg


Mupirocin (Bactroban Ointment)  0 gm TOP BID [facility]


Ondansetron HCl (Zofran Inj)  4 mg IVP Q4H PRN


   PRN Reason: Nausea/Vomiting


Pantoprazole Sodium (Protonix Ec Tab)  40 mg PO 0600 [facility]


   Last Admin: 02/06/19 05:28 Dose:  40 mg











- Labs


Labs: 


                                        





                                 02/06/19 06:00 





                                 02/06/19 06:00 





                                        











PT  18.5 SECONDS (9.4-12.5)  H  02/05/19  03:10    


 


INR  1.67   02/05/19  03:10    


 


APTT  76.8 Seconds (26.9-38.3)  H  02/06/19  02:40    














- Constitutional


Appears: Toxic, Unkempt





- Head Exam


Head Exam: ATRAUMATIC, NORMAL INSPECTION, NORMOCEPHALIC





- Respiratory Exam


Respiratory Exam: Decreased Breath Sounds


Additional comments: 





BIPAP





- Cardiovascular Exam


Cardiovascular Exam: RRR, +S1, +S2





- GI/Abdominal Exam


GI & Abdominal Exam: Soft, Normal Bowel Sounds.  absent: Tenderness





- Extremities Exam


Additional comments: 





Chronic lymphadema skin changes in both legs. B/l leg swelling +2 swelling





- Neurological Exam


Neurological Exam: Alert, Oriented x3





- Psychiatric Exam


Psychiatric exam: Normal Affect, Normal Mood





- Skin


Skin Exam: Dry, Intact, Warm





Assessment and Plan





- Assessment and Plan (Free Text)


Plan: 





CHF vs COPD exacerbation


Hx of chronic lympedema 


Hx of HTN


Hx of morbid obesity


Hx of lower extremity cellulitis with Actinobacter baumanii





Plan


No indication of lower leg cellulitis, will stop Zyvox


Continue Doxycycline for potential bronchitis/pneumonia 


V/Q scan low probability for PE


Continue to monitor closely 





Jeffrey, PGY-3





<Aron Grier S - Last Filed: 02/06/19 15:16>





Objective





- Vital Signs/Intake and Output


Vital Signs (last 24 hours): 


                                        











Temp Pulse Resp BP Pulse Ox


 


 98 F   88   30 H  103/85   98 


 


 02/06/19 04:00  02/06/19 08:25  02/06/19 08:00  02/06/19 14:28  02/06/19 08:00








Intake and Output: 


                                        











 02/06/19 02/06/19





 06:59 18:59


 


Intake Total 950 


 


Output Total 1100 


 


Balance -150 














- Medications


Medications: 


                               Current Medications





Acetaminophen (Tylenol 325mg Tab)  650 mg PO Q6 PRN


   PRN Reason: TEMP>=99.5F


   Last Admin: 02/06/19 02:33 Dose:  650 mg


Acetaminophen (Tylenol 650 Mg Supp)  650 mg RC Q6H PRN


   PRN Reason: TEMP>=99.5F


Albuterol/Ipratropium (Duoneb 3 Mg/0.5 Mg (3 Ml) Ud)  3 ml IH [id number] [facility]


   Last Admin: 02/06/19 15:07 Dose:  3 ml


Atorvastatin Calcium (Lipitor)  40 mg PO DIN [facility]


   Last Admin: 02/05/19 17:46 Dose:  Not Given


Docusate Sodium (Colace)  100 mg PO TID [facility]


   Last Admin: 02/06/19 14:30 Dose:  100 mg


Furosemide (Lasix)  40 mg IVP Q8H [facility]


   Last Admin: 02/06/19 14:28 Dose:  40 mg


diltiaZEM IVPB 100mg in NS (Cardizem 100mg In Ns)  100 mls @ 5 mls/hr IV .Q20H 

PRN; Protocol


   PRN Reason: TITRATE PER MD ORDER


   Last Admin: 02/05/19 22:26 Dose:  5 mg/hr, 5 mls/hr


Doxycycline Hyclate 100 mg/ (Sodium Chloride)  100 mls @ 100 mls/hr IVPB Q12 

TREV; Protocol


   Last Admin: 02/06/19 09:52 Dose:  100 mls/hr


Heparin Sodium/Sodium Chloride (Heparin 64274 Units/250ml 1/2 Normal Saline)  

25,000 units in 250 mls @ 20.02 mls/hr IV .[id number] PRN; Protocol


   PRN Reason: ADJUST RATE PER PROTOCOL


   Last Admin: 02/06/19 03:55 Dose:  8.25 units/kg/hr, 20.02 mls/hr


Methylprednisolone (Solu-Medrol)  20 mg IVP Q8H [facility]


   Last Admin: 02/06/19 14:28 Dose:  20 mg


Mupirocin (Bactroban Ointment)  0 gm TOP BID [facility]


Ondansetron HCl (Zofran Inj)  4 mg IVP Q4H PRN


   PRN Reason: Nausea/Vomiting


Pantoprazole Sodium (Protonix Ec Tab)  40 mg PO 0600 [facility]


   Last Admin: 02/06/19 05:28 Dose:  40 mg











- Labs


Labs: 


                                        





                                 02/06/19 06:00 





                                 02/06/19 06:00 





                                        











PT  18.5 SECONDS (9.4-12.5)  H  02/05/19  03:10    


 


INR  1.67   02/05/19  03:10    


 


APTT  76.8 Seconds (26.9-38.3)  H  02/06/19  02:40    














Assessment and Plan





- Assessment and Plan (Free Text)


Plan: 





Infectious diseases Attending Physician Attestation


Patient seen and examined, discussed with medical resident. I have reviewed the 

patient's history of present illness, past medical, social, personal and family 

histories, pertinent physical exam findings, course so far in this hospital 

admission, pertinent laboratory and imaging results. I agree with the above 

findings, assessment and plan. In addition, patient with leg wounds without 

obvious evidence of infection - will d/c Zyvox and observe and follow up further

recommendations of Podiatry. Continue PO Doxycycline for acute bronchitis.

## 2019-02-07 LAB
% IRON SATURATION: 6 % (ref 20–55)
ALBUMIN SERPL-MCNC: 3.4 G/DL (ref 3–4.8)
ALBUMIN/GLOB SERPL: 0.8 {RATIO} (ref 1.1–1.8)
ALT SERPL-CCNC: 12 U/L (ref 7–56)
ARTERIAL BLOOD GAS HEMOGLOBIN: 8.1 G/DL (ref 11.7–17.4)
ARTERIAL BLOOD GAS O2 SAT: 96.4 % (ref 95–98)
ARTERIAL BLOOD GAS PCO2: 68 MM/HG (ref 35–45)
ARTERIAL BLOOD GAS TCO2: 38.8 MMOL.L (ref 22–28)
AST SERPL-CCNC: 48 U/L (ref 17–59)
BASOPHILS # BLD AUTO: 0 K/MM3 (ref 0–2)
BASOPHILS NFR BLD: 0 % (ref 0–3)
BILIRUB DIRECT SERPL-MCNC: 0.3 MG/DL (ref 0–0.4)
BUN SERPL-MCNC: 27 MG/DL (ref 7–21)
CALCIUM SERPL-MCNC: 8.2 MG/DL (ref 8.4–10.5)
EOSINOPHIL # BLD: 0 10*3/UL (ref 0–0.7)
EOSINOPHIL NFR BLD: 0 % (ref 1.5–5)
ERYTHROCYTE [DISTWIDTH] IN BLOOD BY AUTOMATED COUNT: 20.5 % (ref 11.5–14.5)
FERRITIN SERPL-MCNC: 21.9 NG/ML
FOLATE SERPL-MCNC: 6.2 NG/ML
GFR NON-AFRICAN AMERICAN: > 60
HCO3 BLDA-SCNC: 36.7 MMOL/L (ref 21–28)
HGB BLD-MCNC: 8.3 G/DL (ref 14–18)
INHALED O2 CONCENTRATION: 36 %
IRON SERPL-MCNC: 19 UG/DL (ref 45–180)
LYMPHOCYTES # BLD: 0.6 10*3/UL (ref 1.2–3.4)
LYMPHOCYTES NFR BLD AUTO: 9.4 % (ref 22–35)
MCH RBC QN AUTO: 19.2 PG (ref 25–35)
MCHC RBC AUTO-ENTMCNC: 25.5 G/DL (ref 31–37)
MCV RBC AUTO: 75.3 FL (ref 80–105)
MONOCYTES # BLD AUTO: 0.2 10*3/UL (ref 0.1–0.6)
MONOCYTES NFR BLD: 2.9 % (ref 1–6)
O2 CAP BLDA-SCNC: 11.1 ML/DL (ref 16–24)
O2 CT BLDA-SCNC: 10.7 ML/DL (ref 15–23)
PH BLDA: 7.34 [PH] (ref 7.35–7.45)
PLATELET # BLD: 236 10^3/UL (ref 120–450)
PMV BLD AUTO: 9.3 FL (ref 7–11)
PO2 BLDA: 72 MM/HG (ref 80–100)
RBC # BLD AUTO: 4.33 10^6/UL (ref 3.5–6.1)
TIBC SERPL-MCNC: 296 UG/DL (ref 261–462)
VIT B12 SERPL-MCNC: 737 PG/ML (ref 239–931)
WBC # BLD AUTO: 6.5 10^3/UL (ref 4.5–11)

## 2019-02-07 RX ADMIN — METHYLPREDNISOLONE SODIUM SUCCINATE SCH MG: 40 INJECTION, POWDER, FOR SOLUTION INTRAMUSCULAR; INTRAVENOUS at 21:35

## 2019-02-07 RX ADMIN — METHYLPREDNISOLONE SODIUM SUCCINATE SCH MG: 40 INJECTION, POWDER, FOR SOLUTION INTRAMUSCULAR; INTRAVENOUS at 14:00

## 2019-02-07 RX ADMIN — IPRATROPIUM BROMIDE AND ALBUTEROL SULFATE SCH ML: .5; 3 SOLUTION RESPIRATORY (INHALATION) at 00:32

## 2019-02-07 RX ADMIN — SILVER SULFADIAZINE SCH GM: 10 CREAM TOPICAL at 17:12

## 2019-02-07 RX ADMIN — SILVER SULFADIAZINE SCH GM: 10 CREAM TOPICAL at 13:52

## 2019-02-07 RX ADMIN — HEPARIN SODIUM PRN MLS/HR: 10000 INJECTION, SOLUTION INTRAVENOUS at 06:54

## 2019-02-07 RX ADMIN — PANTOPRAZOLE SODIUM SCH MG: 40 TABLET, DELAYED RELEASE ORAL at 06:41

## 2019-02-07 RX ADMIN — HEPARIN SODIUM PRN MLS/HR: 10000 INJECTION, SOLUTION INTRAVENOUS at 21:23

## 2019-02-07 RX ADMIN — METHYLPREDNISOLONE SODIUM SUCCINATE SCH MG: 40 INJECTION, POWDER, FOR SOLUTION INTRAMUSCULAR; INTRAVENOUS at 06:41

## 2019-02-07 RX ADMIN — IPRATROPIUM BROMIDE AND ALBUTEROL SULFATE SCH ML: .5; 3 SOLUTION RESPIRATORY (INHALATION) at 04:29

## 2019-02-07 RX ADMIN — IPRATROPIUM BROMIDE AND ALBUTEROL SULFATE SCH: .5; 3 SOLUTION RESPIRATORY (INHALATION) at 13:32

## 2019-02-07 RX ADMIN — IPRATROPIUM BROMIDE AND ALBUTEROL SULFATE SCH ML: .5; 3 SOLUTION RESPIRATORY (INHALATION) at 20:39

## 2019-02-07 RX ADMIN — HEPARIN SODIUM PRN MLS/HR: 10000 INJECTION, SOLUTION INTRAVENOUS at 08:16

## 2019-02-07 RX ADMIN — IPRATROPIUM BROMIDE AND ALBUTEROL SULFATE SCH ML: .5; 3 SOLUTION RESPIRATORY (INHALATION) at 07:11

## 2019-02-07 RX ADMIN — IPRATROPIUM BROMIDE AND ALBUTEROL SULFATE SCH ML: .5; 3 SOLUTION RESPIRATORY (INHALATION) at 13:32

## 2019-02-07 NOTE — CP.PCM.PN
<Aaliyah Ceballosalberto - Last Filed: 02/07/19 08:29>





Subjective





- Date & Time of Evaluation


Date of Evaluation: 02/07/19


Time of Evaluation: 08:29





- Subjective


Subjective: 


Podiatry consult note for Dr. Huffman





57 y/o male seen and evaluated in the ICU for bilateral leg wounds. Patient 

alert, awake, and in much better spirits today.  As per nursing, no acute 

overnight events. Patient reports he was scheduled for an appointment with the 

wound care center [facility] Dr. Heck for the wounds. 














Objective





- Vital Signs/Intake and Output


Vital Signs (last 24 hours): 


                                        











Temp Pulse Resp BP Pulse Ox


 


 98.8 F   90   18   148/81   91 L


 


 02/07/19 04:00  02/07/19 08:01  02/07/19 07:15  02/07/19 08:01  02/07/19 08:01








Intake and Output: 


                                        











 02/07/19 02/07/19





 06:59 18:59


 


Intake Total 790 250


 


Output Total 1500 


 


Balance -710 250














- Medications


Medications: 


                               Current Medications





Acetaminophen (Tylenol 325mg Tab)  650 mg PO Q6 PRN


   PRN Reason: TEMP>=99.5F


   Last Admin: 02/06/19 02:33 Dose:  650 mg


Acetaminophen (Tylenol 650 Mg Supp)  650 mg RC Q6H PRN


   PRN Reason: TEMP>=99.5F


Albuterol/Ipratropium (Duoneb 3 Mg/0.5 Mg (3 Ml) Ud)  3 ml IH [id number] [facility]


   Last Admin: 02/07/19 07:11 Dose:  3 ml


Atorvastatin Calcium (Lipitor)  40 mg PO DIN [facility]


   Last Admin: 02/06/19 17:37 Dose:  40 mg


Docusate Sodium (Colace)  100 mg PO TID [facility]


   Last Admin: 02/06/19 19:50 Dose:  Not Given


Ergocalciferol (Drisdol 50,000 Intl Units Cap)  1 cap PO Q7D [facility]


Furosemide (Lasix)  40 mg IVP Q8H [facility]


   Last Admin: 02/07/19 06:40 Dose:  40 mg


Doxycycline Hyclate 100 mg/ (Sodium Chloride)  100 mls @ 100 mls/hr IVPB Q12 TREV

; Protocol


   Last Admin: 02/06/19 21:37 Dose:  100 mls/hr


Heparin Sodium/Sodium Chloride (Heparin 87995 Units/250ml 1/2 Normal Saline)  

25,000 units in 250 mls @ 20.02 mls/hr IV .[id number] PRN; Protocol


   PRN Reason: ADJUST RATE PER PROTOCOL


   Last Admin: 02/07/19 08:16 Dose:  8.25 units/kg/hr, 20.02 mls/hr


Methylprednisolone (Solu-Medrol)  20 mg IVP Q8H [facility]


   Last Admin: 02/07/19 06:41 Dose:  20 mg


Metoprolol Tartrate (Lopressor)  25 mg PO BID [facility]


   Last Admin: 02/06/19 17:40 Dose:  25 mg


Mupirocin (Bactroban Ointment)  0 gm TOP BID [facility]


Ondansetron HCl (Zofran Inj)  4 mg IVP Q4H PRN


   PRN Reason: Nausea/Vomiting


Pantoprazole Sodium (Protonix Ec Tab)  40 mg PO 0600 [facility]


   Last Admin: 02/07/19 06:41 Dose:  40 mg











- Labs


Labs: 


                                        





                                 02/07/19 06:10 





                                 02/07/19 06:10 





                                        











PT  18.5 SECONDS (9.4-12.5)  H  02/05/19  03:10    


 


INR  1.67   02/05/19  03:10    


 


APTT  41.0 Seconds (26.9-38.3)  H  02/07/19  06:10    














- Constitutional


Appears: Well, Non-toxic, No Acute Distress





- Head Exam


Head Exam: ATRAUMATIC, NORMOCEPHALIC





- Extremities Exam


Additional comments: 


Bilateral Lower Extremity Exam





VASC: DP and PT non-palpable secondary to significant lymphedema, TG within 

normal limits





DERM





RIGHT: superficial wounds noted to the lateral aspect of the right leg and to 

the posterior thigh, 100% granular base, no probe to bone, no malodor, minimal 

drainage, no signs of infection noted





LEFT: small superficial wound noted to the medial aspect of the left leg, fibro-

granular base, no probe to bone, no malodor, minimal drainage, no signs of 

infection noted, significant lymphedema noted bilaterally, 





NEURO: unable to assess, patient responding to pain stimuli





ORTHO: pain with movement and range of motion, pain on palpation to wounds








- Neurological Exam


Neurological Exam: Alert, Awake, Oriented x3





- Psychiatric Exam


Psychiatric exam: Normal Affect, Normal Mood





Assessment and Plan





- Assessment and Plan (Free Text)


Assessment: 


57 y/o male patient seen with superficial wounds to bilateral lower extremity, 

non-infected





Plan: 


Patient seen and evaluated with Dr. Huffman


Plan discussed with attending


Chart, labs and vitals were reviewed, afebrile, absent leukocytosis


Wound cleansed with saline and dressed with bactroban, maxorb, optifoam, and ACE


Patient stable from podiatry standpoint, no intervention required


Podiatry will continue to follow








<Grzegorz Huffman - Last Filed: 02/08/19 14:40>





Objective





- Vital Signs/Intake and Output


Vital Signs (last 24 hours): 


                                        











Temp Pulse Resp BP Pulse Ox


 


 97 F L  90   20   157/72 H  94 L


 


 02/08/19 08:00  02/08/19 10:00  02/08/19 08:00  02/08/19 09:42  02/08/19 08:00








Intake and Output: 


                                        











 02/08/19 02/08/19





 06:59 18:59


 


Intake Total 240 250


 


Balance 240 250














- Medications


Medications: 


                               Current Medications





Acetaminophen (Tylenol 325mg Tab)  650 mg PO Q6 PRN


   PRN Reason: TEMP>=99.5F


   Last Admin: 02/06/19 02:33 Dose:  650 mg


Acetaminophen (Tylenol 650 Mg Supp)  650 mg RC Q6H PRN


   PRN Reason: TEMP>=99.5F


Albuterol/Ipratropium (Duoneb 3 Mg/0.5 Mg (3 Ml) Ud)  3 ml IH [id number] [facility]


   Last Admin: 02/08/19 13:16 Dose:  3 ml


Atorvastatin Calcium (Lipitor)  40 mg PO DIN [facility]


   Last Admin: 02/07/19 17:09 Dose:  40 mg


Docusate Sodium (Colace)  100 mg PO TID [facility]


   Last Admin: 02/08/19 14:39 Dose:  Not Given


Ergocalciferol (Drisdol 50,000 Intl Units Cap)  1 cap PO Q7D [facility]


   Last Admin: 02/07/19 09:51 Dose:  1 cap


Furosemide (Lasix)  40 mg IVP Q8H [facility]


   Last Admin: 02/08/19 05:31 Dose:  40 mg


Doxycycline Hyclate 100 mg/ (Sodium Chloride)  100 mls @ 100 mls/hr IVPB Q12 

[facility]; Protocol


   Last Admin: 02/08/19 11:00 Dose:  100 mls/hr


Heparin Sodium/Sodium Chloride (Heparin 16474 Units/250ml 1/2 Normal Saline)  

25,000 units in 250 mls @ 20.02 mls/hr IV .[id number] PRN; Protocol


   PRN Reason: ADJUST RATE PER PROTOCOL


   Last Admin: 02/08/19 14:05 Dose:  7.25 units/kg/hr, 17.594 mls/hr


Iron Sucrose 200 mg/ Sodium (Chloride)  110 mls @ 110 mls/hr IVPB DAILY [facility]


   Stop: 02/11/19 10:59


   Last Admin: 02/08/19 11:00 Dose:  110 mls/hr


Methylprednisolone (Solu-Medrol)  20 mg IVP Q8H [facility]


   Last Admin: 02/08/19 05:30 Dose:  20 mg


Metoprolol Tartrate (Lopressor)  25 mg PO BID [facility]


   Last Admin: 02/08/19 09:42 Dose:  25 mg


Mupirocin (Bactroban Ointment)  0 gm TOP DAILY [facility]


   Last Admin: 02/08/19 09:41 Dose:  1 applic


Ondansetron HCl (Zofran Inj)  4 mg IVP Q4H PRN


   PRN Reason: Nausea/Vomiting


Pantoprazole Sodium (Protonix Ec Tab)  40 mg PO 0600 [facility]


   Last Admin: 02/08/19 05:31 Dose:  40 mg


Silver Sulfadiazine (Silvadene 1% 25 Gm)  0 gm TP BID [facility]


   Last Admin: 02/08/19 09:41 Dose:  25 gm











- Labs


Labs: 


                                        





                                 02/08/19 05:50 





                                 02/08/19 05:50 





                                        











PT  18.5 SECONDS (9.4-12.5)  H  02/05/19  03:10    


 


INR  1.67   02/05/19  03:10    


 


APTT  63.5 Seconds (26.9-38.3)  H  02/08/19  12:15    














Attending/Attestation





- Attestation


I have personally seen and examined this patient.: Yes


I have fully participated in the care of the patient.: Yes


I have reviewed all pertinent clinical information, including history, physical 

exam and plan: Yes

## 2019-02-07 NOTE — CP.PCM.CON
History of Present Illness





- History of Present Illness


History of Present Illness: 


58 year old male patient with Hx morbid obesity, leg cellulitis, and chronic 

lymphedema, who was admitted for persistnet shorntess of breath COPD 

exacerbation. He was found to have new onset atrial fibrillation and was 

admitted to the ICU. Hematology consulted for micricytic anemia. At this time 

patient denies any blood in the stool, urine, hemoptysis, nose bleeds, bruising.

He states he has never had a colonoscopy or an endoscopy. He was told in the 

past that he was anemic but was never worked up further. Patient denies any 

family history of blood disorders or malignancy. He has no fevers, chills, night

sweats, appetite changes or weight loss. At this time he is being moved from the

ICU to a regular floor. 





PMhx: morbid obesity, leg cellulitis, and chronic lymphedema


PSurgHx: surgery for R ankle fracture in 2006, I+D of RLE for cellulitis in 2016


Allergies: NKDA


Home meds: none


Fam hx: noncontributory


Soc hx: denies smoking, EtOH or illicit drug use








Review of Systems





- Review of Systems


All systems: reviewed and no additional remarkable complaints except





- Constitutional


Constitutional: Sleep Apnea.  absent: Chills, Fatigue, Fever, Frequent Falls, 

Headache





- EENT


Eyes: absent: Blind Spots, Diplopia, Irritation, Sees Flashes


Nose/Mouth/Throat: absent: Epistaxis, Nasal Congestion, Nasal Discharge, Sinus 

Pain, Sore Throat





- Cardiovascular


Cardiovascular: Dyspnea, Irregular Heart Rhythm.  absent: Chest Pain, Chest Pain

at Rest





- Respiratory


Respiratory: Dyspnea on Exertion.  absent: Cough, Dyspnea, Hemoptysis, Wheezing





- Gastrointestinal


Gastrointestinal: absent: Abdominal Pain, Bloating, Change in Bowel Habits, 

Change in Stool Character, Early Satiety, Excessive Flatus





- Genitourinary


Genitourinary: absent: Change in Urinary Stream, Difficulty Urinating, Pyuria, 

Nocturia, Urinary Incontinence, Voiding Freq/Small Amts





- Musculoskeletal


Musculoskeletal: Back Pain, Muscle Weakness





- Integumentary


Integumentary: Wounds





- Neurological


Neurological: As Per HPI.  absent: Abnormal Gait





- Endocrine


Endocrine: As Per HPI





Past Patient History





- Past Social History


Smoking Status: Never Smoked





- CARDIAC


Hx Cardiac Disorders: Yes


Hx Hypertension: Yes





- PULMONARY


Hx Respiratory Disorders: No





- NEUROLOGICAL


Hx Neurological Disorder: No





- HEENT


Hx HEENT Problems: No





- RENAL


Hx Chronic Kidney Disease: No





- ENDOCRINE/METABOLIC


Hx Endocrine Disorders: No





- HEMATOLOGICAL/ONCOLOGICAL


Hx Blood Transfusions: Yes


Hx Blood Transfusion Reaction: No





- INTEGUMENTARY


Hx Dermatological Problems: No


Other/Comment: BILATERAL LYMPEDEMA WITH 2 LARGE OPENING ONE IS TO RIGHT LOWER 

LATERAL SIDE OF RIGHT LEG AND 2ND TO BACKSIDE OF THIGH.DRAINING COPIOUS 

SEROUSANGUINEOUS DRAINAGE,





- MUSCULOSKELETAL/RHEUMATOLOGICAL


Hx Musculoskeletal Disorders: Yes





- GASTROINTESTINAL


Hx Gastrointestinal Disorders: No





- GENITOURINARY/GYNECOLOGICAL


Hx Genitourinary Disorders: No





- PSYCHIATRIC


Hx Psychophysiologic Disorder: No


Hx Substance Use: No





- SURGICAL HISTORY


Hx Orthopedic Surgery: Yes (right ankle)





- ANESTHESIA


Hx Anesthesia Reactions: No


Hx Malignant Hyperthermia: No





Meds


Allergies/Adverse Reactions: 


                                    Allergies











Allergy/AdvReac Type Severity Reaction Status Date / Time


 


No Known Allergies Allergy   Verified 03/18/16 19:49














- Medications


Medications: 


                               Current Medications





Acetaminophen (Tylenol 325mg Tab)  650 mg PO Q6 PRN


   PRN Reason: TEMP>=99.5F


   Last Admin: 02/06/19 02:33 Dose:  650 mg


Acetaminophen (Tylenol 650 Mg Supp)  650 mg RC Q6H PRN


   PRN Reason: TEMP>=99.5F


Albuterol/Ipratropium (Duoneb 3 Mg/0.5 Mg (3 Ml) Ud)  3 ml IH [id number] [facility]


   Last Admin: 02/07/19 07:11 Dose:  3 ml


Atorvastatin Calcium (Lipitor)  40 mg PO DIN [facility]


   Last Admin: 02/06/19 17:37 Dose:  40 mg


Docusate Sodium (Colace)  100 mg PO TID [facility]


   Last Admin: 02/07/19 09:50 Dose:  100 mg


Ergocalciferol (Drisdol 50,000 Intl Units Cap)  1 cap PO Q7D [facility]


   Last Admin: 02/07/19 09:51 Dose:  1 cap


Furosemide (Lasix)  40 mg IVP Q8H [facility]


   Last Admin: 02/07/19 06:40 Dose:  40 mg


Doxycycline Hyclate 100 mg/ (Sodium Chloride)  100 mls @ 100 mls/hr IVPB Q12 

[facility]; Protocol


   Last Admin: 02/07/19 09:52 Dose:  100 mls/hr


Heparin Sodium/Sodium Chloride (Heparin 54803 Units/250ml 1/2 Normal Saline)  

25,000 units in 250 mls @ 20.02 mls/hr IV .[id number] PRN; Protocol


   PRN Reason: ADJUST RATE PER PROTOCOL


   Last Titration: 02/07/19 09:46 Dose:  10.25 units/kg/hr, 24.874 mls/hr


Iron Sucrose 200 mg/ Sodium (Chloride)  110 mls @ 110 mls/hr IVPB DAILY [facility]


   Stop: 02/11/19 10:59


Methylprednisolone (Solu-Medrol)  20 mg IVP Q8H [facility]


   Last Admin: 02/07/19 06:41 Dose:  20 mg


Metoprolol Tartrate (Lopressor)  25 mg PO BID [facility]


   Last Admin: 02/07/19 09:51 Dose:  25 mg


Mupirocin (Bactroban Ointment)  0 gm TOP BID [facility]


Ondansetron HCl (Zofran Inj)  4 mg IVP Q4H PRN


   PRN Reason: Nausea/Vomiting


Pantoprazole Sodium (Protonix Ec Tab)  40 mg PO 0600 [facility]


   Last Admin: 02/07/19 06:41 Dose:  40 mg


Silver Sulfadiazine (Silvadene 1% 25 Gm)  0 gm TP BID [facility]











Physical Exam





- Constitutional


Appears: Well, No Acute Distress





- Head Exam


Head Exam: ATRAUMATIC, NORMAL INSPECTION, NORMOCEPHALIC





- Eye Exam


Eye Exam: EOMI, Normal appearance, PERRL


Pupil Exam: NORMAL ACCOMODATION





- ENT Exam


ENT Exam: Mucous Membranes Moist, Normal Exam





- Neck Exam


Neck exam: Positive for: Full Rom, Normal Inspection.  Negative for: 

Lymphadenopathy





- Respiratory Exam


Respiratory Exam: Clear to Auscultation Bilateral, NORMAL BREATHING PATTERN





- Cardiovascular Exam


Cardiovascular Exam: +S1, +S2





- GI/Abdominal Exam


GI & Abdominal Exam: Normal Bowel Sounds, Soft





- Rectal Exam


Rectal Exam: Deferred





- Neurological Exam


Neurological exam: Alert, CN II-XII Intact, Oriented x3





- Psychiatric Exam


Psychiatric exam: Normal Affect





- Additional Findings


Additional findings: 


patient has bilateral leg wounds which are wrapped in ACE bandage. 








Results





- Vital Signs


Recent Vital Signs: 


                                Last Vital Signs











Temp  98.8 F   02/07/19 04:00


 


Pulse  102 H  02/07/19 09:51


 


Resp  18   02/07/19 07:15


 


BP  151/77 H  02/07/19 09:51


 


Pulse Ox  91 L  02/07/19 08:01














- Labs


Result Diagrams: 


                                 02/07/19 06:10





                                 02/07/19 06:10


Labs: 


                         Laboratory Results - last 24 hr











  02/05/19 02/07/19 02/07/19





  10:10 05:00 06:10


 


WBC    6.5  D


 


RBC    4.33


 


Hgb    8.3 L


 


Hct    32.6 L


 


MCV    75.3 L


 


MCH    19.2 L


 


MCHC    25.5 L


 


RDW    20.5 H


 


Plt Count    236


 


MPV    9.3


 


Neut % (Auto)    87.7 H


 


Lymph % (Auto)    9.4 L


 


Mono % (Auto)    2.9


 


Eos % (Auto)    0.0 L


 


Baso % (Auto)    0.0


 


Lymph # (Auto)    0.6 L


 


Mono # (Auto)    0.2


 


Eos # (Auto)    0.0


 


Baso # (Auto)    0.00


 


Absolute Neuts (auto)    5.72


 


APTT   


 


pCO2   68 H 


 


pO2   72.0 L 


 


HCO3   36.7 H 


 


ABG pH   7.34 L 


 


ABG Total CO2   38.8 H 


 


ABG O2 Saturation   96.4 


 


ABG O2 Content   10.7 L 


 


ABG Base Excess   9.5 H 


 


ABG Hemoglobin   8.1 L 


 


ABG Carboxyhemoglobin   2.3 H 


 


POC ABG HHb (Measured)   3.5 


 


ABG Methemoglobin   1.1 


 


ABG O2 Capacity   11.1 L 


 


Hgb O2 Saturation   93.1 L 


 


FiO2   36.0 


 


Crit Value Called To   Dr nils scott 


 


Crit Value Called By   Avni lott 


 


Blood Gas Notified Time   524 


 


Sodium   


 


Potassium   


 


Chloride   


 


Carbon Dioxide   


 


Anion Gap   


 


BUN   


 


Creatinine   


 


Est GFR ( Amer)   


 


Est GFR (Non-Af Amer)   


 


Random Glucose   


 


Calcium   


 


Phosphorus   


 


Magnesium   


 


Erythropoietin  303.6 H  


 


Total Bilirubin   


 


Direct Bilirubin   


 


AST   


 


ALT   


 


Alkaline Phosphatase   


 


Total Protein   


 


Albumin   


 


Globulin   


 


Albumin/Globulin Ratio   














  02/07/19 02/07/19





  06:10 06:10


 


WBC  


 


RBC  


 


Hgb  


 


Hct  


 


MCV  


 


MCH  


 


MCHC  


 


RDW  


 


Plt Count  


 


MPV  


 


Neut % (Auto)  


 


Lymph % (Auto)  


 


Mono % (Auto)  


 


Eos % (Auto)  


 


Baso % (Auto)  


 


Lymph # (Auto)  


 


Mono # (Auto)  


 


Eos # (Auto)  


 


Baso # (Auto)  


 


Absolute Neuts (auto)  


 


APTT   41.0 H


 


pCO2  


 


pO2  


 


HCO3  


 


ABG pH  


 


ABG Total CO2  


 


ABG O2 Saturation  


 


ABG O2 Content  


 


ABG Base Excess  


 


ABG Hemoglobin  


 


ABG Carboxyhemoglobin  


 


POC ABG HHb (Measured)  


 


ABG Methemoglobin  


 


ABG O2 Capacity  


 


Hgb O2 Saturation  


 


FiO2  


 


Crit Value Called To  


 


Crit Value Called By  


 


Blood Gas Notified Time  


 


Sodium  139 


 


Potassium  4.9 


 


Chloride  98 


 


Carbon Dioxide  37 H 


 


Anion Gap  8 L 


 


BUN  27 H 


 


Creatinine  1.0 


 


Est GFR ( Amer)  > 60 


 


Est GFR (Non-Af Amer)  > 60 


 


Random Glucose  133 H 


 


Calcium  8.2 L 


 


Phosphorus  3.6 


 


Magnesium  1.9 


 


Erythropoietin  


 


Total Bilirubin  0.7 


 


Direct Bilirubin  0.3 


 


AST  48 


 


ALT  12 


 


Alkaline Phosphatase  35 L 


 


Total Protein  7.6 


 


Albumin  3.4 


 


Globulin  4.1 


 


Albumin/Globulin Ratio  0.8 L 














Assessment & Plan





- Assessment and Plan (Free Text)


Assessment: 


58 year old male patient with history of chronic leg ulcers and morbid obesity 

admitted for shortness of breath into the ICU noted to have hypochromic 

microcytic anemia. The etiology of his anemia at this time is unclear but likely

related to his acute on chronic blood loss most likely via gastrointestinal 

tract. 





Plan


Iron, B12, Folate, Retic, Ferritin, TIBC


Will give 2 doses of venofer in the next three days


GI evaluation, will need to have upper and lower endoscopic evaluation in order 

to rule out source of blood loss


UA 


Would transfuse if Hb drops below 8


Will continue to follow 





Thank you for allowing me to partake in your patient care. 





Sincerely, 


Prakash Rosales

## 2019-02-07 NOTE — PN
DATE:  02/07/2019



SUBJECTIVE:  The patient is seen lying in the bed in ICU, bed 7.  The

patient is presently reading the newspaper and watching video on his phone.

The patient is completely alert, awake, responsive.  The patient does not

appear to be in respiratory distress according to the patient.  The patient

is feeling significantly better since the day of admission.  Overnight

nurse's notes were reviewed.



OBJECTIVE:

Vital signs:  T-max 98.7.  Telemetry shows a heart rate atrial fibrillation

in 90s and 88.  Telemetry shows atrial fibrillation.  Blood pressure 122/67

and 111/84.  Respiration 18, O2 sat is 92%, 93%, 94%, 95%.  Intake and

output, output was 2000 yesterday.

HEENT:  Head examination normocephalic, atraumatic.  HEENT examination

shows pinkish pale conjunctivae.  Anicteric sclerae.  No oropharyngeal

lesion.

GENERAL:  Morbidly obese  male lying in the bed,

comfortable and in no distress.

CARDIOVASCULAR:  Shows S1, S2, irregular rhythm.  Positive systolic murmur

in left sternal border, right second intercostal space, left second

intercostal space.

ABDOMEN:  Super morbidly obese.

GENITALIA:  Male.

RECTAL:  Examination is deferred.

EXTREMITIES:  Shows chronic lymphedema.  Positive Ace wrap of the lower

legs.

MUSCULOSKELETAL:  Shows a body mass index of 73 and a body weight of 535

pounds.

NEUROLOGIC:  The patient is alert, awake, responsive.  Oriented to person,

place and time.



DIAGNOSTICS:  From 02/07/2019, WBC 6.5, hemoglobin/hematocrit 8.3/32.6,

platelet 236, granulocyte 88% segs.  PTT is 41.  ABG done today on 36%

FIO2, pH of 7.34, which has improved from the pH since admission of 7.17. 

On today's ABG, pH is 7.34, pCO2 is down to 68 from 95, pO2 is 72, bicarb

37, saturation of 96%.  Sodium is 139, potassium 4.9, chloride 98, CO2 of

37, anion gap 8, BUN 27, creatinine 1.0, GFR greater than 60, glucose 133,

calcium 8.2, phosphorus 3.6, magnesium 1.9.  LFTs are normal.



IMPRESSION AND PLAN:

1.  New onset atrial fibrillation.

2.  Acute versus acute-on-chronic hypercarbic hypercapnic respiratory

failure with severe respiratory acidosis.

3.  Atrial fibrillation.

4.  History of hypertension.

5.  Severe lymphedema of the bilateral lower extremity.

6  Super morbid obesity with elevated body mass index of 73.

7.  Microcytic anemia with granulocytosis.

8.  Severe respiratory acidosis with hypercapnic respiratory failure.

9.  Hyperkalemia.

10.  Prerenal kidney injury.

11.  Hypovitaminosis vitamin D.

12.  Iron deficiency anemia.

13.  Probably acute systolic congestive heart failure with elevated ProBNP.

14.  Hyperphosphatemia.

15.  Status post packed red blood cells transfusion x1.

16.  Questionable sleep apnea.

17.  Hypoxemia.

18.  Severe gait dysfunction and deconditioning and functional quadriplegia

with chronic bedridden status.

19.  Concentric left ventricular hypertrophy.

20.  Reduced right ventricular systolic function, moderately.

21.  Moderately dilated left atrium.

22.  Moderate-to-severe aortic regurgitation and mildly thickened aortic

valve.

23.  Mild mitral regurgitation.

24.  Mild pulmonary hypertension with right ventricular systolic pressure

of 36 mmHg.

25.  Chronic bilateral lymphedema of the lower extremity.

26.  Gait dysfunction.

27.  Deconditioning.

28.  Acute versus acute on chronic hypercapnic respiratory failure.

29.  Probable severe obstructive sleep apnea.

30.  Bilateral lower extremity noninfected superficial wounds.

31.  Hyperlipidemia.



PLAN:  At this time, the patient has been updated about his condition,

diagnosis, overall guarded to poor prognosis, need for further diagnostic

therapeutic intervention, need for packed red blood cell transfusion has

been discussed with the patient at length.  I have also spoken to the

patient's next of kin yesterday and updated about the patient's condition,

overall guarded prognosis.  I have advised the patient's next of kin to

have the patient's family visit him, including his former wife and his

children, which the patient's next of kin is going to contact and inform

them.  At present, the patient and the patient's next of kin has been

updated about the patient's overall guarded to poor prognosis and all the

diagnostic details and diagnosis and recommendation by all the physician

involved in the care has been explained to the patient and the patient's

next of kin.



At present, the patient will be followed up.  The patient will be continued

on repeat labs.  Consultation with Gastroenterology, Hematology/Oncology,

Infectious Disease, Cardiology, Podiatry.



MEDICATIONS:  Current medications are Colace 100 three times a day,

doxycycline 100 every 12 hours, Drisdol 50,000 weekly, DuoNeb nebulizer

every 4 hours, heparin drip, Lasix 40 IV every 8 hours, Lipitor 40 mg

daily, Lopressor 25 twice a day, Protonix 40 daily, Solu-Medrol decreased

to 20 IV every 8 hours, Tylenol 650 mg every 6 hours, Zofran 4 mg IV every

4 hours p.r.n. BIPAP, the patient is on BIPAP 18/6, 90% FIO2, rate of 15. 

Heart healthy diet, RODERICK stockings, SCDs.  The patient has been ordered

transfusion of one more unit of PRBC today.



Cardiology recommends long-term anticoagulation.  GI does not recommend any

GI workup, awaiting further recommendation from Hematology/Oncology,

Infectious Disease, Podiatry.



Dictated and electronically signed, not read.







__________________________________________

Varinder Mooney MD





DD:  02/07/2019 8:22:14

DT:  02/07/2019 8:28:14

Job # [id number]

## 2019-02-07 NOTE — CP.CCUPN
<FaniLakisha - Last Filed: 02/07/19 10:10>





CCU Subjective





- Physician Review


Subjective (Free Text): 








Lakisha Mohr PGY1 Critical Care Progress Note





Patient seen and examined at bedside this morning. No acute events overnight. 

Patient states he slept more comfortably during the night compared to previous 

nights. Used bipap for a total of two hours overnight. ABG improved this 

morning. Patient offers no complaints at this time and denies CP, SOB, nausea, 

vomiting, headaches and abdominal pain. 





CCU Objective





- Vital Signs / Intake & Output


Vital Signs (Last 4 hours): 


Vital Signs











  Resp BP Pulse Ox


 


 02/07/19 07:15  18   92 L


 


 02/07/19 06:40   122/67 











Intake and Output (Last 8hrs): 


                                 Intake & Output











 02/06/19 02/07/19 02/07/19





 22:59 06:59 14:59


 


Intake Total 1290 250 


 


Output Total 2000  


 


Balance -710 250 


 


Intake:   


 


   250 


 


    antibiotics 250  


 


    cardizem 50  


 


    heparin 240  


 


  Oral 500  


 


Output:   


 


  Urine 2000  


 


    Urethral (Weller) 1500  


 


    Urine, Voided 500  


 


Other:   


 


  # Voids   


 


    Urine, Voided 2  














- Physical Exam


Head: Positive for: Atraumatic, Normocephalic


Pupils: Positive for: PERRL


Extroacular Muscles: Positive for: EOMI


Conjunctiva: Positive for: Normal


Mouth: Positive for: Moist Mucous Membranes


Neck: Positive for: Normal Range of Motion


Respiratory/Chest: Positive for: Clear to Auscultation, Good Air Exchange.  

Negative for: Respiratory Distress, Accessory Muscle Use


Cardiovascular: Positive for: Normal S1, S2, Irregular Rhythm.  Negative for: 

Murmurs


Abdomen: Positive for: Normal Bowel Sounds.  Negative for: Tenderness, 

Distention, Peritoneal Signs


Back: Positive for: Normal Inspection


Upper Extremity: Positive for: Normal Inspection.  Negative for: Cyanosis, Edema


Lower Extremity: Positive for: Neurovascularly Intact.  Negative for: Normal 

Inspection (Lymphedema bilaterally, with chronic venous stasis changes; dry 

skin), Any's Sign


Neurological: Positive for: GCS=15, CN II-XII Intact, Speech Normal, Motor Func 

Grossly Intact, Normal Sensory Function


Skin: Positive for: Warm, Dry, Normal Color.  Negative for: Rashes


Psychiatric: Positive for: Alert, Oriented x 3, Normal Insight, Normal Concen

tration





- Medications


Active Medications: 


Active Medications











Generic Name Dose Route Start Last Admin





  Trade Name Freq  PRN Reason Stop Dose Admin


 


Acetaminophen  650 mg  02/05/19 06:19  02/06/19 02:33





  Tylenol 325mg Tab  PO   650 mg





  Q6 PRN   Administration





  TEMP>=99.5F   





     





     





     


 


Acetaminophen  650 mg  02/05/19 06:19  





  Tylenol 650 Mg Supp  RC   





  Q6H PRN   





  TEMP>=99.5F   





     





     





     


 


Albuterol/Ipratropium  3 ml  02/06/19 11:31  02/07/19 07:11





  Duoneb 3 Mg/0.5 Mg (3 Ml) Ud  IH   3 ml





  [id number] LEX   Administration





     





     





     





     


 


Atorvastatin Calcium  40 mg  02/05/19 17:00  02/06/19 17:37





  Lipitor  PO   40 mg





  DIN LEX   Administration





     





     





     





     


 


Docusate Sodium  100 mg  02/05/19 10:00  02/06/19 19:50





  Colace  PO   Not Given





  TID LEX   





     





     





     





     


 


Furosemide  40 mg  02/05/19 06:30  02/07/19 06:40





  Lasix  IVP   40 mg





  Q8H LEX   Administration





     





     





     





     


 


Doxycycline Hyclate 100 mg/  100 mls @ 100 mls/hr  02/05/19 13:00  02/06/19 

21:37





  Sodium Chloride  IVPB   100 mls/hr





  Q12 LEX   Administration





     





     





  Protocol   





     


 


Heparin Sodium/Sodium Chloride  25,000 units in 250 mls @ 20.02 mls/hr  02/05/19

13:13  02/07/19 06:54





  Heparin 93448 Units/250ml 1/2 Normal Saline  IV   8.25 units/kg/hr





  [id number] PRN   20.02 mls/hr





  ADJUST RATE PER PROTOCOL   Administration





     





  Protocol   





  8.25 UNITS/KG/HR   


 


Methylprednisolone  20 mg  02/05/19 13:00  02/07/19 06:41





  Solu-Medrol  IVP   20 mg





  Q8H LEX   Administration





     





     





     





     


 


Metoprolol Tartrate  25 mg  02/06/19 18:00  02/06/19 17:40





  Lopressor  PO   25 mg





  BID [facility]   Administration





     





     





     





     


 


Mupirocin  0 gm  02/06/19 10:00  





  Bactroban Ointment  TOP   





  BID [facility]   





     





     





     





     


 


Ondansetron HCl  4 mg  02/05/19 06:19  





  Zofran Inj  IVP   





  Q4H PRN   





  Nausea/Vomiting   





     





     





     


 


Pantoprazole Sodium  40 mg  02/06/19 06:00  02/07/19 06:41





  Protonix Ec Tab  PO   40 mg





  0600 LEX   Administration





     





     





     





     














- Patient Studies


Lab Studies: 


                              Microbiology Studies











 02/05/19 11:10 MRSA Culture (Admit) - Final





 Naris    MRSA NOT DETECTED








                                   Lab Studies











  02/07/19 02/07/19 02/07/19 Range/Units





  06:10 06:10 06:10 


 


WBC    6.5  D  (4.5-11.0)  10^3/uL


 


RBC    4.33  (3.5-6.1)  10^6/uL


 


Hgb    8.3 L  (14.0-18.0)  g/dL


 


Hct    32.6 L  (42.0-52.0)  %


 


MCV    75.3 L  (80.0-105.0)  fl


 


MCH    19.2 L  (25.0-35.0)  pg


 


MCHC    25.5 L  (31.0-37.0)  g/dl


 


RDW    20.5 H  (11.5-14.5)  %


 


Plt Count    236  (120.0-450.0)  10^3/uL


 


MPV    9.3  (7.0-11.0)  fl


 


Neut % (Auto)    87.7 H  (50.0-68.0)  %


 


Lymph % (Auto)    9.4 L  (22.0-35.0)  %


 


Mono % (Auto)    2.9  (1.0-6.0)  %


 


Eos % (Auto)    0.0 L  (1.5-5.0)  %


 


Baso % (Auto)    0.0  (0.0-3.0)  %


 


Lymph # (Auto)    0.6 L  (1.2-3.4)  


 


Mono # (Auto)    0.2  (0.1-0.6)  


 


Eos # (Auto)    0.0  (0.0-0.7)  


 


Baso # (Auto)    0.00  (0.0-2.0)  K/mm3


 


Absolute Neuts (auto)    5.72  (1.4-6.5)  


 


APTT  41.0 H    (26.9-38.3)  Seconds


 


pCO2     (35-45)  mm/Hg


 


pO2     ()  mm/Hg


 


HCO3     (21-28)  mmol/L


 


ABG pH     (7.35-7.45)  


 


ABG Total CO2     (22-28)  mmol.L


 


ABG O2 Saturation     (95-98)  %


 


ABG O2 Content     (15-23)  ML/dl


 


ABG Base Excess     (-2.0-3.0)  mmol/L


 


ABG Hemoglobin     (11.7-17.4)  g/dL


 


ABG Carboxyhemoglobin     (0.5-1.5)  %


 


POC ABG HHb (Measured)     (0-5)  %


 


ABG Methemoglobin     (0.0-3.0)  %


 


ABG O2 Capacity     (16-24)  mL/dl


 


Hgb O2 Saturation     (95.0-98.0)  %


 


FiO2     %


 


Crit Value Called To     


 


Crit Value Called By     


 


Blood Gas Notified Time     


 


Sodium   139   (132-148)  mmol/L


 


Potassium   4.9   (3.6-5.0)  mmol/L


 


Chloride   98   ()  mmol/L


 


Carbon Dioxide   37 H   (21-33)  mmol/L


 


Anion Gap   8 L   (10-20)  


 


BUN   27 H   (7-21)  mg/dL


 


Creatinine   1.0   (0.8-1.5)  mg/dl


 


Est GFR (African Amer)   > 60   


 


Est GFR (Non-Af Amer)   > 60   


 


Random Glucose   133 H   ()  mg/dL


 


Calcium   8.2 L   (8.4-10.5)  mg/dL


 


Phosphorus   3.6   (2.5-4.5)  mg/dL


 


Magnesium   1.9   (1.7-2.2)  mg/dL


 


Erythropoietin     (2.6-18.5)  mIU/mL


 


Total Bilirubin   0.7   (0.2-1.3)  mg/dL


 


Direct Bilirubin   0.3   (0.0-0.4)  mg/dL


 


AST   48   (17-59)  U/L


 


ALT   12   (7-56)  U/L


 


Alkaline Phosphatase   35 L   ()  U/L


 


Total Protein   7.6   (5.8-8.3)  g/dL


 


Albumin   3.4   (3.0-4.8)  g/dL


 


Globulin   4.1   gm/dL


 


Albumin/Globulin Ratio   0.8 L   (1.1-1.8)  














  02/07/19 02/06/19 02/05/19 Range/Units





  05:00 08:30 10:10 


 


WBC     (4.5-11.0)  10^3/uL


 


RBC     (3.5-6.1)  10^6/uL


 


Hgb     (14.0-18.0)  g/dL


 


Hct     (42.0-52.0)  %


 


MCV     (80.0-105.0)  fl


 


MCH     (25.0-35.0)  pg


 


MCHC     (31.0-37.0)  g/dl


 


RDW     (11.5-14.5)  %


 


Plt Count     (120.0-450.0)  10^3/uL


 


MPV     (7.0-11.0)  fl


 


Neut % (Auto)     (50.0-68.0)  %


 


Lymph % (Auto)     (22.0-35.0)  %


 


Mono % (Auto)     (1.0-6.0)  %


 


Eos % (Auto)     (1.5-5.0)  %


 


Baso % (Auto)     (0.0-3.0)  %


 


Lymph # (Auto)     (1.2-3.4)  


 


Mono # (Auto)     (0.1-0.6)  


 


Eos # (Auto)     (0.0-0.7)  


 


Baso # (Auto)     (0.0-2.0)  K/mm3


 


Absolute Neuts (auto)     (1.4-6.5)  


 


APTT     (26.9-38.3)  Seconds


 


pCO2  68 H  85 H*   (35-45)  mm/Hg


 


pO2  72.0 L  118.0 H   ()  mm/Hg


 


HCO3  36.7 H  34.8 H   (21-28)  mmol/L


 


ABG pH  7.34 L  7.22 L   (7.35-7.45)  


 


ABG Total CO2  38.8 H  37.4 H   (22-28)  mmol.L


 


ABG O2 Saturation  96.4  98.5 H   (95-98)  %


 


ABG O2 Content  10.7 L  11.8 L   (15-23)  ML/dl


 


ABG Base Excess  9.5 H  5.6 H   (-2.0-3.0)  mmol/L


 


ABG Hemoglobin  8.1 L  8.6 L   (11.7-17.4)  g/dL


 


ABG Carboxyhemoglobin  2.3 H  2.0 H   (0.5-1.5)  %


 


POC ABG HHb (Measured)  3.5  1.5   (0-5)  %


 


ABG Methemoglobin  1.1  1.1   (0.0-3.0)  %


 


ABG O2 Capacity  11.1 L  12.0 L   (16-24)  mL/dl


 


Hgb O2 Saturation  93.1 L  95.4   (95.0-98.0)  %


 


FiO2  36.0  50.0   %


 


Crit Value Called To  j.dr Radha   


 


Crit Value Called By  lin Flaherty   


 


Blood Gas Notified Time  524  843   


 


Sodium     (132-148)  mmol/L


 


Potassium     (3.6-5.0)  mmol/L


 


Chloride     ()  mmol/L


 


Carbon Dioxide     (21-33)  mmol/L


 


Anion Gap     (10-20)  


 


BUN     (7-21)  mg/dL


 


Creatinine     (0.8-1.5)  mg/dl


 


Est GFR (African Amer)     


 


Est GFR (Non-Af Amer)     


 


Random Glucose     ()  mg/dL


 


Calcium     (8.4-10.5)  mg/dL


 


Phosphorus     (2.5-4.5)  mg/dL


 


Magnesium     (1.7-2.2)  mg/dL


 


Erythropoietin    303.6 H  (2.6-18.5)  mIU/mL


 


Total Bilirubin     (0.2-1.3)  mg/dL


 


Direct Bilirubin     (0.0-0.4)  mg/dL


 


AST     (17-59)  U/L


 


ALT     (7-56)  U/L


 


Alkaline Phosphatase     ()  U/L


 


Total Protein     (5.8-8.3)  g/dL


 


Albumin     (3.0-4.8)  g/dL


 


Globulin     gm/dL


 


Albumin/Globulin Ratio     (1.1-1.8)  








                         Laboratory Results - last 24 hr











  02/05/19 02/06/19 02/07/19





  10:10 08:30 05:00


 


WBC   


 


RBC   


 


Hgb   


 


Hct   


 


MCV   


 


MCH   


 


MCHC   


 


RDW   


 


Plt Count   


 


MPV   


 


Neut % (Auto)   


 


Lymph % (Auto)   


 


Mono % (Auto)   


 


Eos % (Auto)   


 


Baso % (Auto)   


 


Lymph # (Auto)   


 


Mono # (Auto)   


 


Eos # (Auto)   


 


Baso # (Auto)   


 


Absolute Neuts (auto)   


 


APTT   


 


pCO2   85 H*  68 H


 


pO2   118.0 H  72.0 L


 


HCO3   34.8 H  36.7 H


 


ABG pH   7.22 L  7.34 L


 


ABG Total CO2   37.4 H  38.8 H


 


ABG O2 Saturation   98.5 H  96.4


 


ABG O2 Content   11.8 L  10.7 L


 


ABG Base Excess   5.6 H  9.5 H


 


ABG Hemoglobin   8.6 L  8.1 L


 


ABG Carboxyhemoglobin   2.0 H  2.3 H


 


POC ABG HHb (Measured)   1.5  3.5


 


ABG Methemoglobin   1.1  1.1


 


ABG O2 Capacity   12.0 L  11.1 L


 


Hgb O2 Saturation   95.4  93.1 L


 


FiO2   50.0  36.0


 


Crit Value Called To   j.dr Dr nils Mccormack


 


Crit Value Called By   lin Lino


 


Blood Gas Notified Time   843  524


 


Sodium   


 


Potassium   


 


Chloride   


 


Carbon Dioxide   


 


Anion Gap   


 


BUN   


 


Creatinine   


 


Est GFR ( Amer)   


 


Est GFR (Non-Af Amer)   


 


Random Glucose   


 


Calcium   


 


Phosphorus   


 


Magnesium   


 


Erythropoietin  303.6 H  


 


Total Bilirubin   


 


Direct Bilirubin   


 


AST   


 


ALT   


 


Alkaline Phosphatase   


 


Total Protein   


 


Albumin   


 


Globulin   


 


Albumin/Globulin Ratio   














  02/07/19 02/07/19 02/07/19





  06:10 06:10 06:10


 


WBC  6.5  D  


 


RBC  4.33  


 


Hgb  8.3 L  


 


Hct  32.6 L  


 


MCV  75.3 L  


 


MCH  19.2 L  


 


MCHC  25.5 L  


 


RDW  20.5 H  


 


Plt Count  236  


 


MPV  9.3  


 


Neut % (Auto)  87.7 H  


 


Lymph % (Auto)  9.4 L  


 


Mono % (Auto)  2.9  


 


Eos % (Auto)  0.0 L  


 


Baso % (Auto)  0.0  


 


Lymph # (Auto)  0.6 L  


 


Mono # (Auto)  0.2  


 


Eos # (Auto)  0.0  


 


Baso # (Auto)  0.00  


 


Absolute Neuts (auto)  5.72  


 


APTT    41.0 H


 


pCO2   


 


pO2   


 


HCO3   


 


ABG pH   


 


ABG Total CO2   


 


ABG O2 Saturation   


 


ABG O2 Content   


 


ABG Base Excess   


 


ABG Hemoglobin   


 


ABG Carboxyhemoglobin   


 


POC ABG HHb (Measured)   


 


ABG Methemoglobin   


 


ABG O2 Capacity   


 


Hgb O2 Saturation   


 


FiO2   


 


Crit Value Called To   


 


Crit Value Called By   


 


Blood Gas Notified Time   


 


Sodium   139 


 


Potassium   4.9 


 


Chloride   98 


 


Carbon Dioxide   37 H 


 


Anion Gap   8 L 


 


BUN   27 H 


 


Creatinine   1.0 


 


Est GFR ( Amer)   > 60 


 


Est GFR (Non-Af Amer)   > 60 


 


Random Glucose   133 H 


 


Calcium   8.2 L 


 


Phosphorus   3.6 


 


Magnesium   1.9 


 


Erythropoietin   


 


Total Bilirubin   0.7 


 


Direct Bilirubin   0.3 


 


AST   48 


 


ALT   12 


 


Alkaline Phosphatase   35 L 


 


Total Protein   7.6 


 


Albumin   3.4 


 


Globulin   4.1 


 


Albumin/Globulin Ratio   0.8 L 














Critical Care Progress Note





- Nutrition


Nutrition: 


                                    Nutrition











 Category Date Time Status


 


 Heart Healthy Diet [DIET] Diets  02/06/19 Dinner Active














Assessment/Plan





- Assessment and Plan (Free Text)


Assessment: 








This is a 58 year old male with PMH of morbid obesity, B/L chronic leg 

lymphedema and obesity hypoventilation syndrome presenting to the ICU for 

hypercapnic respiratory failure 2/2 LORIN vs COPD. ABG improved this morning. 

Currently on NC 4L. 





Plan: 





Hypercapnic respiratory failure 2/2 LORIN vs COPD


-currently on 4L NC with oxygen saturation of 90%. Goal of O2 sat 88-92%


-ABG this morning pH/CO2/O2/bicarb 7.34/68/72/37


-solumedrol 20mg q8 IVP


-duonebs q4 lex


-low threshold for intubation


-AOx3, protecting airway


-anesthesiology following patient 


-V/Q scan shows low probability for PE


-LE dopplers shows no DVT


-CXR 2/5/19 shows no active disease 





New onset Afib - rate controlled


-continue lopressor 25mg BID 


-currently on heparin drip 


-echo 2/5/19 shows EF 56%, moderate to severe AR, left atrium dilated, RVS 36


-Cardiology on consult, Dr. Cardona





B/L Chronic leg lymphedema 


-concern for leg cellulitis


-currently on doxyxycline for bronchitis, zyvox stopped 


-WBC is WNL, afebrile


-lasix 40mg IV q8


-ID on consult, Dr. Olvera





Microcytic anemia


-Hg is 8.3 from 8.5


-darren monitor 


-GI on consult, no intervention at this time 


-Heme on consult, Dr. Orellana 





PPX with protonix and heparin drip





Patient seen and case discussed with attending, Dr. Marsh 





<Jose Eduardo Marsh - Last Filed: 02/07/19 10:14>





CCU Objective





- Vital Signs / Intake & Output


Vital Signs (Last 4 hours): 


Vital Signs











  Pulse Resp BP Pulse Ox


 


 02/07/19 09:51  102 H   151/77 H 


 


 02/07/19 08:01  90   148/81  91 L


 


 02/07/19 08:00  88    81 L


 


 02/07/19 07:15   18   92 L


 


 02/07/19 07:00  88   154/67 H  84 L


 


 02/07/19 06:40    122/67 











Intake and Output (Last 8hrs): 


                                 Intake & Output











 02/06/19 02/07/19 02/07/19





 22:59 06:59 14:59


 


Intake Total 1290 790 260


 


Output Total 2000 1500 


 


Balance -710 -710 260


 


Intake:   


 


   790 260


 


    antibiotics 250 250 


 


    cardizem 50 50 


 


    heparin 240 240 


 


  Oral 500  


 


Output:   


 


  Urine 2000 1500 


 


    Urethral ([place]) 1500 1500 


 


    Urine, Voided 500  


 


Other:   


 


  # Voids   


 


    Urine, Voided 2  


 


  # Bowel Movements  0 














- Medications


Active Medications: 


Active Medications











Generic Name Dose Route Start Last Admin





  Trade Name Freq  PRN Reason Stop Dose Admin


 


Acetaminophen  650 mg  02/05/19 06:19  02/06/19 02:33





  Tylenol 325mg Tab  PO   650 mg





  Q6 PRN   Administration





  TEMP>=99.5F   





     





     





     


 


Acetaminophen  650 mg  02/05/19 06:19  





  Tylenol 650 Mg Supp  RC   





  Q6H PRN   





  TEMP>=99.5F   





     





     





     


 


Albuterol/Ipratropium  3 ml  02/06/19 11:31  02/07/19 07:11





  Duoneb 3 Mg/0.5 Mg (3 Ml) Ud  IH   3 ml





  [id number] LEX   Administration





     





     





     





     


 


Atorvastatin Calcium  40 mg  02/05/19 17:00  02/06/19 17:37





  Lipitor  PO   40 mg





  DIN LEX   Administration





     





     





     





     


 


Docusate Sodium  100 mg  02/05/19 10:00  02/07/19 09:50





  Colace  PO   100 mg





  TID LEX   Administration





     





     





     





     


 


Ergocalciferol  1 cap  02/07/19 08:15  02/07/19 09:51





  Drisdol 50,000 Intl Units Cap  PO   1 cap





  Q7D LEX   Administration





     





     





     





     


 


Furosemide  40 mg  02/05/19 06:30  02/07/19 06:40





  Lasix  IVP   40 mg





  Q8H LEX   Administration





     





     





     





     


 


Doxycycline Hyclate 100 mg/  100 mls @ 100 mls/hr  02/05/19 13:00  02/07/19 

09:52





  Sodium Chloride  IVPB   100 mls/hr





  Q12 LEX   Administration





     





     





  Protocol   





     


 


Heparin Sodium/Sodium Chloride  25,000 units in 250 mls @ 20.02 mls/hr  02/05/19

 13:13  02/07/19 09:46





  Heparin 16069 Units/250ml 1/2 Normal Saline  IV   10.25 units/kg/hr





  .[id number] PRN   24.874 mls/hr





  ADJUST RATE PER PROTOCOL   Titration





     





  Protocol   





  8.25 UNITS/KG/HR   


 


Iron Sucrose 200 mg/ Sodium  110 mls @ 110 mls/hr  02/07/19 10:00  





  Chloride  IVPB  02/11/19 10:59  





  DAILY LEX   





     





     





     





     


 


Methylprednisolone  20 mg  02/05/19 13:00  02/07/19 06:41





  Solu-Medrol  IVP   20 mg





  Q8H LEX   Administration





     





     





     





     


 


Metoprolol Tartrate  25 mg  02/06/19 18:00  02/07/19 09:51





  Lopressor  PO   25 mg





  BID LEX   Administration





     





     





     





     


 


Mupirocin  0 gm  02/06/19 10:00  





  Bactroban Ointment  TOP   





  BID LEX   





     





     





     





     


 


Ondansetron HCl  4 mg  02/05/19 06:19  





  Zofran Inj  IVP   





  Q4H PRN   





  Nausea/Vomiting   





     





     





     


 


Pantoprazole Sodium  40 mg  02/06/19 06:00  02/07/19 06:41





  Protonix Ec Tab  PO   40 mg





  0600 LEX   Administration





     





     





     





     














- Patient Studies


Lab Studies: 


                              Microbiology Studies











 02/05/19 11:10 MRSA Culture (Admit) - Final





 Naris    MRSA NOT DETECTED








                                   Lab Studies











  02/07/19 02/07/19 02/07/19 Range/Units





  06:10 06:10 06:10 


 


WBC    6.5  D  (4.5-11.0)  10^3/uL


 


RBC    4.33  (3.5-6.1)  10^6/uL


 


Hgb    8.3 L  (14.0-18.0)  g/dL


 


Hct    32.6 L  (42.0-52.0)  %


 


MCV    75.3 L  (80.0-105.0)  fl


 


MCH    19.2 L  (25.0-35.0)  pg


 


MCHC    25.5 L  (31.0-37.0)  g/dl


 


RDW    20.5 H  (11.5-14.5)  %


 


Plt Count    236  (120.0-450.0)  10^3/uL


 


MPV    9.3  (7.0-11.0)  fl


 


Neut % (Auto)    87.7 H  (50.0-68.0)  %


 


Lymph % (Auto)    9.4 L  (22.0-35.0)  %


 


Mono % (Auto)    2.9  (1.0-6.0)  %


 


Eos % (Auto)    0.0 L  (1.5-5.0)  %


 


Baso % (Auto)    0.0  (0.0-3.0)  %


 


Lymph # (Auto)    0.6 L  (1.2-3.4)  


 


Mono # (Auto)    0.2  (0.1-0.6)  


 


Eos # (Auto)    0.0  (0.0-0.7)  


 


Baso # (Auto)    0.00  (0.0-2.0)  K/mm3


 


Absolute Neuts (auto)    5.72  (1.4-6.5)  


 


APTT  41.0 H    (26.9-38.3)  Seconds


 


pCO2     (35-45)  mm/Hg


 


pO2     ()  mm/Hg


 


HCO3     (21-28)  mmol/L


 


ABG pH     (7.35-7.45)  


 


ABG Total CO2     (22-28)  mmol.L


 


ABG O2 Saturation     (95-98)  %


 


ABG O2 Content     (15-23)  ML/dl


 


ABG Base Excess     (-2.0-3.0)  mmol/L


 


ABG Hemoglobin     (11.7-17.4)  g/dL


 


ABG Carboxyhemoglobin     (0.5-1.5)  %


 


POC ABG HHb (Measured)     (0-5)  %


 


ABG Methemoglobin     (0.0-3.0)  %


 


ABG O2 Capacity     (16-24)  mL/dl


 


Hgb O2 Saturation     (95.0-98.0)  %


 


FiO2     %


 


Crit Value Called To     


 


Crit Value Called By     


 


Blood Gas Notified Time     


 


Sodium   139   (132-148)  mmol/L


 


Potassium   4.9   (3.6-5.0)  mmol/L


 


Chloride   98   ()  mmol/L


 


Carbon Dioxide   37 H   (21-33)  mmol/L


 


Anion Gap   8 L   (10-20)  


 


BUN   27 H   (7-21)  mg/dL


 


Creatinine   1.0   (0.8-1.5)  mg/dl


 


Est GFR (African Amer)   > 60   


 


Est GFR (Non-Af Amer)   > 60   


 


Random Glucose   133 H   ()  mg/dL


 


Calcium   8.2 L   (8.4-10.5)  mg/dL


 


Phosphorus   3.6   (2.5-4.5)  mg/dL


 


Magnesium   1.9   (1.7-2.2)  mg/dL


 


Erythropoietin     (2.6-18.5)  mIU/mL


 


Total Bilirubin   0.7   (0.2-1.3)  mg/dL


 


Direct Bilirubin   0.3   (0.0-0.4)  mg/dL


 


AST   48   (17-59)  U/L


 


ALT   12   (7-56)  U/L


 


Alkaline Phosphatase   35 L   ()  U/L


 


Total Protein   7.6   (5.8-8.3)  g/dL


 


Albumin   3.4   (3.0-4.8)  g/dL


 


Globulin   4.1   gm/dL


 


Albumin/Globulin Ratio   0.8 L   (1.1-1.8)  














  02/07/19 02/05/19 Range/Units





  05:00 10:10 


 


WBC    (4.5-11.0)  10^3/uL


 


RBC    (3.5-6.1)  10^6/uL


 


Hgb    (14.0-18.0)  g/dL


 


Hct    (42.0-52.0)  %


 


MCV    (80.0-105.0)  fl


 


MCH    (25.0-35.0)  pg


 


MCHC    (31.0-37.0)  g/dl


 


RDW    (11.5-14.5)  %


 


Plt Count    (120.0-450.0)  10^3/uL


 


MPV    (7.0-11.0)  fl


 


Neut % (Auto)    (50.0-68.0)  %


 


Lymph % (Auto)    (22.0-35.0)  %


 


Mono % (Auto)    (1.0-6.0)  %


 


Eos % (Auto)    (1.5-5.0)  %


 


Baso % (Auto)    (0.0-3.0)  %


 


Lymph # (Auto)    (1.2-3.4)  


 


Mono # (Auto)    (0.1-0.6)  


 


Eos # (Auto)    (0.0-0.7)  


 


Baso # (Auto)    (0.0-2.0)  K/mm3


 


Absolute Neuts (auto)    (1.4-6.5)  


 


APTT    (26.9-38.3)  Seconds


 


pCO2  68 H   (35-45)  mm/Hg


 


pO2  72.0 L   ()  mm/Hg


 


HCO3  36.7 H   (21-28)  mmol/L


 


ABG pH  7.34 L   (7.35-7.45)  


 


ABG Total CO2  38.8 H   (22-28)  mmol.L


 


ABG O2 Saturation  96.4   (95-98)  %


 


ABG O2 Content  10.7 L   (15-23)  ML/dl


 


ABG Base Excess  9.5 H   (-2.0-3.0)  mmol/L


 


ABG Hemoglobin  8.1 L   (11.7-17.4)  g/dL


 


ABG Carboxyhemoglobin  2.3 H   (0.5-1.5)  %


 


POC ABG HHb (Measured)  3.5   (0-5)  %


 


ABG Methemoglobin  1.1   (0.0-3.0)  %


 


ABG O2 Capacity  11.1 L   (16-24)  mL/dl


 


Hgb O2 Saturation  93.1 L   (95.0-98.0)  %


 


FiO2  36.0   %


 


Crit Value Called To  Dr nils scott   


 


Crit Value Called By  Avni lott   


 


Blood Gas Notified Time  524   


 


Sodium    (132-148)  mmol/L


 


Potassium    (3.6-5.0)  mmol/L


 


Chloride    ()  mmol/L


 


Carbon Dioxide    (21-33)  mmol/L


 


Anion Gap    (10-20)  


 


BUN    (7-21)  mg/dL


 


Creatinine    (0.8-1.5)  mg/dl


 


Est GFR (African Amer)    


 


Est GFR (Non-Af Amer)    


 


Random Glucose    ()  mg/dL


 


Calcium    (8.4-10.5)  mg/dL


 


Phosphorus    (2.5-4.5)  mg/dL


 


Magnesium    (1.7-2.2)  mg/dL


 


Erythropoietin   303.6 H  (2.6-18.5)  mIU/mL


 


Total Bilirubin    (0.2-1.3)  mg/dL


 


Direct Bilirubin    (0.0-0.4)  mg/dL


 


AST    (17-59)  U/L


 


ALT    (7-56)  U/L


 


Alkaline Phosphatase    ()  U/L


 


Total Protein    (5.8-8.3)  g/dL


 


Albumin    (3.0-4.8)  g/dL


 


Globulin    gm/dL


 


Albumin/Globulin Ratio    (1.1-1.8)  








                         Laboratory Results - last 24 hr











  02/05/19 02/07/19 02/07/19





  10:10 05:00 06:10


 


WBC    6.5  D


 


RBC    4.33


 


Hgb    8.3 L


 


Hct    32.6 L


 


MCV    75.3 L


 


MCH    19.2 L


 


MCHC    25.5 L


 


RDW    20.5 H


 


Plt Count    236


 


MPV    9.3


 


Neut % (Auto)    87.7 H


 


Lymph % (Auto)    9.4 L


 


Mono % (Auto)    2.9


 


Eos % (Auto)    0.0 L


 


Baso % (Auto)    0.0


 


Lymph # (Auto)    0.6 L


 


Mono # (Auto)    0.2


 


Eos # (Auto)    0.0


 


Baso # (Auto)    0.00


 


Absolute Neuts (auto)    5.72


 


APTT   


 


pCO2   68 H 


 


pO2   72.0 L 


 


HCO3   36.7 H 


 


ABG pH   7.34 L 


 


ABG Total CO2   38.8 H 


 


ABG O2 Saturation   96.4 


 


ABG O2 Content   10.7 L 


 


ABG Base Excess   9.5 H 


 


ABG Hemoglobin   8.1 L 


 


ABG Carboxyhemoglobin   2.3 H 


 


POC ABG HHb (Measured)   3.5 


 


ABG Methemoglobin   1.1 


 


ABG O2 Capacity   11.1 L 


 


Hgb O2 Saturation   93.1 L 


 


FiO2   36.0 


 


Crit Value Called To   Dr nils scott 


 


Crit Value Called By   Avni lott 


 


Blood Gas Notified Time   524 


 


Sodium   


 


Potassium   


 


Chloride   


 


Carbon Dioxide   


 


Anion Gap   


 


BUN   


 


Creatinine   


 


Est GFR ( Amer)   


 


Est GFR (Non-Af Amer)   


 


Random Glucose   


 


Calcium   


 


Phosphorus   


 


Magnesium   


 


Erythropoietin  303.6 H  


 


Total Bilirubin   


 


Direct Bilirubin   


 


AST   


 


ALT   


 


Alkaline Phosphatase   


 


Total Protein   


 


Albumin   


 


Globulin   


 


Albumin/Globulin Ratio   














  02/07/19 02/07/19





  06:10 06:10


 


WBC  


 


RBC  


 


Hgb  


 


Hct  


 


MCV  


 


MCH  


 


MCHC  


 


RDW  


 


Plt Count  


 


MPV  


 


Neut % (Auto)  


 


Lymph % (Auto)  


 


Mono % (Auto)  


 


Eos % (Auto)  


 


Baso % (Auto)  


 


Lymph # (Auto)  


 


Mono # (Auto)  


 


Eos # (Auto)  


 


Baso # (Auto)  


 


Absolute Neuts (auto)  


 


APTT   41.0 H


 


pCO2  


 


pO2  


 


HCO3  


 


ABG pH  


 


ABG Total CO2  


 


ABG O2 Saturation  


 


ABG O2 Content  


 


ABG Base Excess  


 


ABG Hemoglobin  


 


ABG Carboxyhemoglobin  


 


POC ABG HHb (Measured)  


 


ABG Methemoglobin  


 


ABG O2 Capacity  


 


Hgb O2 Saturation  


 


FiO2  


 


Crit Value Called To  


 


Crit Value Called By  


 


Blood Gas Notified Time  


 


Sodium  139 


 


Potassium  4.9 


 


Chloride  98 


 


Carbon Dioxide  37 H 


 


Anion Gap  8 L 


 


BUN  27 H 


 


Creatinine  1.0 


 


Est GFR ( Amer)  > 60 


 


Est GFR (Non-Af Amer)  > 60 


 


Random Glucose  133 H 


 


Calcium  8.2 L 


 


Phosphorus  3.6 


 


Magnesium  1.9 


 


Erythropoietin  


 


Total Bilirubin  0.7 


 


Direct Bilirubin  0.3 


 


AST  48 


 


ALT  12 


 


Alkaline Phosphatase  35 L 


 


Total Protein  7.6 


 


Albumin  3.4 


 


Globulin  4.1 


 


Albumin/Globulin Ratio  0.8 L 














Critical Care Progress Note





- Nutrition


Nutrition: 


                                    Nutrition











 Category Date Time Status


 


 Heart Healthy Diet [DIET] Diets  02/06/19 Dinner Active














Attending/Attestation





- Attestation


I have personally seen and examined this patient.: Yes


I have fully participated in the care of the patient.: Yes


I have reviewed all pertinent clinical information: Yes


Notes (Text): 





02/07/19 10:12


The patient was seen and examined at the bedside. 


Patient care was discussed with resident 


Medical records, lab studies were reviewed and management issues were discussed 

and formulated.


Agree with above treatment plans as outlined in 's note with addition 

of the following:








Acute Respiratory Insufficiency \  Hypercapnea \ LORIN - OHS \ Afib with RVR \ 

Cellulitis \ CHF \ ro COPD 








-hemodynamic monitoring to maintain MAP>65


-metoprolol for rate control as per cardiology team


-o2 supplementation and Bipap HS to maintain Spo2 88-92 Pao2>60


-on NC this morning; tolerating well 


-nebs PRN


-ABG improved and near compensated respiratory acidosis noted


-consider pulmonary eval as pt will need sleep study and pulmonary f\u outpt 

once improves


-continue Abx as per ID team and f\u cultures


-f\u Bun\Cr  and U\o; continue diuresis with lasix; monitor and replace e-lites


-PO diet  and aspiration precautions


-ISS and BGM monitoring


-anticoagulation with heparin; monitor PTT and for bleeding


-PT\OT eval


-DVT \ PUD prophylaxis








CCM time 31min

## 2019-02-07 NOTE — CP.PCM.PN
<Tacos Murphy - Last Filed: 02/07/19 12:29>





Subjective





- Date & Time of Evaluation


Date of Evaluation: 02/07/19


Time of Evaluation: 07:00





- Subjective


Subjective: 





ID Progress Note





Patient seen and examined. Patient alert with minimal respiratory distress. No 

fevers. 





Objective





- Vital Signs/Intake and Output


Vital Signs (last 24 hours): 


                                        











Temp Pulse Resp BP Pulse Ox


 


 98.8 F   90   18   148/81   91 L


 


 02/07/19 04:00  02/07/19 08:01  02/07/19 07:15  02/07/19 08:01  02/07/19 08:01








Intake and Output: 


                                        











 02/07/19 02/07/19





 06:59 18:59


 


Intake Total 790 250


 


Output Total 1500 


 


Balance -710 250














- Medications


Medications: 


                               Current Medications





Acetaminophen (Tylenol 325mg Tab)  650 mg PO Q6 PRN


   PRN Reason: TEMP>=99.5F


   Last Admin: 02/06/19 02:33 Dose:  650 mg


Acetaminophen (Tylenol 650 Mg Supp)  650 mg RC Q6H PRN


   PRN Reason: TEMP>=99.5F


Albuterol/Ipratropium (Duoneb 3 Mg/0.5 Mg (3 Ml) Ud)  3 ml IH [id number] [facility]


   Last Admin: 02/07/19 07:11 Dose:  3 ml


Atorvastatin Calcium (Lipitor)  40 mg PO DIN [facility]


   Last Admin: 02/06/19 17:37 Dose:  40 mg


Docusate Sodium (Colace)  100 mg PO TID [facility]


   Last Admin: 02/06/19 19:50 Dose:  Not Given


Ergocalciferol (Drisdol 50,000 Intl Units Cap)  1 cap PO Q7D [facility]


Furosemide (Lasix)  40 mg IVP Q8H [facility]


   Last Admin: 02/07/19 06:40 Dose:  40 mg


Doxycycline Hyclate 100 mg/ (Sodium Chloride)  100 mls @ 100 mls/hr IVPB Q12 

TREV; Protocol


   Last Admin: 02/06/19 21:37 Dose:  100 mls/hr


Heparin Sodium/Sodium Chloride (Heparin 82484 Units/250ml 1/2 Normal Saline)  

25,000 units in 250 mls @ 20.02 mls/hr IV .[id number] PRN; Protocol


   PRN Reason: ADJUST RATE PER PROTOCOL


   Last Admin: 02/07/19 08:16 Dose:  8.25 units/kg/hr, 20.02 mls/hr


Iron Sucrose 200 mg/ Sodium (Chloride)  110 mls @ 110 mls/hr IVPB DAILY [facility]


   Stop: 02/11/19 10:59


Methylprednisolone (Solu-Medrol)  20 mg IVP Q8H [facility]


   Last Admin: 02/07/19 06:41 Dose:  20 mg


Metoprolol Tartrate (Lopressor)  25 mg PO BID [facility]


   Last Admin: 02/06/19 17:40 Dose:  25 mg


Mupirocin (Bactroban Ointment)  0 gm TOP BID [facility]


Ondansetron HCl (Zofran Inj)  4 mg IVP Q4H PRN


   PRN Reason: Nausea/Vomiting


Pantoprazole Sodium (Protonix Ec Tab)  40 mg PO 0600 [facility]


   Last Admin: 02/07/19 06:41 Dose:  40 mg











- Labs


Labs: 


                                        





                                 02/07/19 06:10 





                                 02/07/19 06:10 





                                        











PT  18.5 SECONDS (9.4-12.5)  H  02/05/19  03:10    


 


INR  1.67   02/05/19  03:10    


 


APTT  41.0 Seconds (26.9-38.3)  H  02/07/19  06:10    














- Constitutional


Appears: Non-toxic, No Acute Distress





- Head Exam


Head Exam: ATRAUMATIC, NORMAL INSPECTION, NORMOCEPHALIC





- ENT Exam


ENT Exam: Mucous Membranes Moist





- Respiratory Exam


Respiratory Exam: Decreased Breath Sounds, NORMAL BREATHING PATTERN.  absent: 

Rales, Rhonchi, Wheezes





- Cardiovascular Exam


Cardiovascular Exam: RRR, +S1, +S2





- GI/Abdominal Exam


GI & Abdominal Exam: Soft, Normal Bowel Sounds.  absent: Tenderness





- Extremities Exam


Additional comments: 





Bandaged b/l lower legs


+1 edema 





- Neurological Exam


Neurological Exam: Alert, Awake, Oriented x3





- Psychiatric Exam


Psychiatric exam: Normal Affect, Normal Mood





- Skin


Skin Exam: Intact, Normal Color, Warm





Assessment and Plan





- Assessment and Plan (Free Text)


Plan: 





Acute Bronchitis 


New onset A-fib


Hx of chronic lympedema 


Hx of HTN


Hx of morbid obesity


Hx of lower extremity cellulitis with Actinobacter baumanii





Plan


No cellulitis of lower extremities 


Continue Doxycycline for bronchitis


Blood cultures negative


Continue to monitor closely 





Jeffrey, PGY-3





<Aron Grier S - Last Filed: 02/07/19 17:49>





Objective





- Vital Signs/Intake and Output


Vital Signs (last 24 hours): 


                                        











Temp Pulse Resp BP Pulse Ox


 


 98.8 F   93 H  24   157/94 H  94 L


 


 02/07/19 04:00  02/07/19 17:09  02/07/19 13:35  02/07/19 17:09  02/07/19 13:35








Intake and Output: 


                                        











 02/07/19 02/07/19





 06:59 18:59


 


Intake Total 790 260


 


Output Total 1500 


 


Balance -710 260














- Medications


Medications: 


                               Current Medications





Acetaminophen (Tylenol 325mg Tab)  650 mg PO Q6 PRN


   PRN Reason: TEMP>=99.5F


   Last Admin: 02/06/19 02:33 Dose:  650 mg


Acetaminophen (Tylenol 650 Mg Supp)  650 mg RC Q6H PRN


   PRN Reason: TEMP>=99.5F


Albuterol/Ipratropium (Duoneb 3 Mg/0.5 Mg (3 Ml) Ud)  3 ml IH [id number] [facility]


   Last Admin: 02/07/19 13:32 Dose:  3 ml


Atorvastatin Calcium (Lipitor)  40 mg PO DIN [facility]


   Last Admin: 02/07/19 17:09 Dose:  40 mg


Docusate Sodium (Colace)  100 mg PO TID [facility]


   Last Admin: 02/07/19 17:08 Dose:  Not Given


Ergocalciferol (Drisdol 50,000 Intl Units Cap)  1 cap PO Q7D [facility]


   Last Admin: 02/07/19 09:51 Dose:  1 cap


Furosemide (Lasix)  40 mg IVP Q8H [facility]


   Last Admin: 02/07/19 13:54 Dose:  40 mg


Doxycycline Hyclate 100 mg/ (Sodium Chloride)  100 mls @ 100 mls/hr IVPB Q12 

TREV; Protocol


   Last Admin: 02/07/19 09:52 Dose:  100 mls/hr


Heparin Sodium/Sodium Chloride (Heparin 89933 Units/250ml 1/2 Normal Saline)  

25,000 units in 250 mls @ 20.02 mls/hr IV .[id number] PRN; Protocol


   PRN Reason: ADJUST RATE PER PROTOCOL


   Last Titration: 02/07/19 09:46 Dose:  10.25 units/kg/hr, 24.874 mls/hr


Iron Sucrose 200 mg/ Sodium (Chloride)  110 mls @ 110 mls/hr IVPB DAILY [facility]


   Stop: 02/11/19 10:59


   Last Admin: 02/07/19 13:59 Dose:  110 mls/hr


Methylprednisolone (Solu-Medrol)  20 mg IVP Q8H [facility]


   Last Admin: 02/07/19 14:00 Dose:  20 mg


Metoprolol Tartrate (Lopressor)  25 mg PO BID [facility]


   Last Admin: 02/07/19 17:09 Dose:  25 mg


Mupirocin (Bactroban Ointment)  0 gm TOP BID [facility]


Ondansetron HCl (Zofran Inj)  4 mg IVP Q4H PRN


   PRN Reason: Nausea/Vomiting


Pantoprazole Sodium (Protonix Ec Tab)  40 mg PO 0600 [facility]


   Last Admin: 02/07/19 06:41 Dose:  40 mg


Silver Sulfadiazine (Silvadene 1% 25 Gm)  0 gm TP BID [facility]


   Last Admin: 02/07/19 17:12 Dose:  25 gm











- Labs


Labs: 


                                        





                                 02/07/19 06:10 





                                 02/07/19 06:10 





                                        











PT  18.5 SECONDS (9.4-12.5)  H  02/05/19  03:10    


 


INR  1.67   02/05/19  03:10    


 


APTT  41.0 Seconds (26.9-38.3)  H  02/07/19  06:10    














Assessment and Plan





- Assessment and Plan (Free Text)


Plan: 





Infectious diseases Attending Physician Attestation


Patient seen and examined, discussed with medical resident. I have reviewed the 

patient's history of present illness, past medical, social, personal and family 

histories, pertinent physical exam findings, course so far in this hospital 

admission, pertinent laboratory and imaging results. I agree with the above 

findings, assessment and plan. In addition, patient with leg wounds without 

obvious evidence of infection - continue to observe and follow up further abebe

mmendations of Podiatry. Continue PO Doxycycline for acute bronchitis.

## 2019-02-07 NOTE — CP.PCM.CON
History of Present Illness





- History of Present Illness


History of Present Illness: 





Resident Consult Note for Surgery: Dr. Heck





Patient is a 58 year old male with past medical history of morbid obesity, leg 

cellulitis, chronic lymphedema who presented with shortness of breath and was 

admitted to ICU for hypercapnic respiratory failure. Surgery was consulted for 

evaluation of recurrent thigh wound. Pain is described as sharp in nature, rated

severity +5/10. Patient was scheduled to follow up at wound care clinic today, 

however due to the severity of pain in addition to shortness of breath came to 

the ED. Denies fevers, chills, chest pain, abdominal pain. 





PMH: morbid obesity, leg cellulitis, chronic lymphedema


PSH: right ankle surgery, I&D of RLE 


SHx: denies alcohol, tobacco, illicit drug use 


Allergies: NKDA


FHx: denies 


PMD: none 





Review of Systems





- Review of Systems


All systems: reviewed and no additional remarkable complaints except (as stated 

in HPI)





Past Patient History





- Past Social History


Smoking Status: Never Smoked





- CARDIAC


Hx Cardiac Disorders: Yes


Hx Hypertension: Yes





- PULMONARY


Hx Respiratory Disorders: No





- NEUROLOGICAL


Hx Neurological Disorder: No





- HEENT


Hx HEENT Problems: No





- RENAL


Hx Chronic Kidney Disease: No





- ENDOCRINE/METABOLIC


Hx Endocrine Disorders: No





- HEMATOLOGICAL/ONCOLOGICAL


Hx Blood Transfusions: Yes


Hx Blood Transfusion Reaction: No





- INTEGUMENTARY


Hx Dermatological Problems: No


Other/Comment: BILATERAL LYMPEDEMA WITH 2 LARGE OPENING ONE IS TO RIGHT LOWER 

LATERAL SIDE OF RIGHT LEG AND 2ND TO BACKSIDE OF THIGH.DRAINING COPIOUS 

SEROUSANGUINEOUS DRAINAGE,





- MUSCULOSKELETAL/RHEUMATOLOGICAL


Hx Musculoskeletal Disorders: Yes





- GASTROINTESTINAL


Hx Gastrointestinal Disorders: No





- GENITOURINARY/GYNECOLOGICAL


Hx Genitourinary Disorders: No





- PSYCHIATRIC


Hx Psychophysiologic Disorder: No


Hx Substance Use: No





- SURGICAL HISTORY


Hx Orthopedic Surgery: Yes (right ankle)





- ANESTHESIA


Hx Anesthesia Reactions: No


Hx Malignant Hyperthermia: No





Meds


Allergies/Adverse Reactions: 


                                    Allergies











Allergy/AdvReac Type Severity Reaction Status Date / Time


 


No Known Allergies Allergy   Verified 03/18/16 19:49














- Medications


Medications: 


                               Current Medications





Acetaminophen (Tylenol 325mg Tab)  650 mg PO Q6 PRN


   PRN Reason: TEMP>=99.5F


   Last Admin: 02/06/19 02:33 Dose:  650 mg


Acetaminophen (Tylenol 650 Mg Supp)  650 mg RC Q6H PRN


   PRN Reason: TEMP>=99.5F


Albuterol/Ipratropium (Duoneb 3 Mg/0.5 Mg (3 Ml) Ud)  3 ml IH [id number] [facility]


   Last Admin: 02/07/19 13:32 Dose:  3 ml


Atorvastatin Calcium (Lipitor)  40 mg PO DIN [facility]


   Last Admin: 02/06/19 17:37 Dose:  40 mg


Docusate Sodium (Colace)  100 mg PO TID [facility]


   Last Admin: 02/07/19 09:50 Dose:  100 mg


Ergocalciferol (Drisdol 50,000 Intl Units Cap)  1 cap PO Q7D [facility]


   Last Admin: 02/07/19 09:51 Dose:  1 cap


Furosemide (Lasix)  40 mg IVP Q8H [facility]


   Last Admin: 02/07/19 13:54 Dose:  40 mg


Doxycycline Hyclate 100 mg/ (Sodium Chloride)  100 mls @ 100 mls/hr IVPB Q12 

TREV; Protocol


   Last Admin: 02/07/19 09:52 Dose:  100 mls/hr


Heparin Sodium/Sodium Chloride (Heparin 20339 Units/250ml 1/2 Normal Saline)  

25,000 units in 250 mls @ 20.02 mls/hr IV .[id number] PRN; Protocol


   PRN Reason: ADJUST RATE PER PROTOCOL


   Last Titration: 02/07/19 09:46 Dose:  10.25 units/kg/hr, 24.874 mls/hr


Iron Sucrose 200 mg/ Sodium (Chloride)  110 mls @ 110 mls/hr IVPB DAILY [facility]


   Stop: 02/11/19 10:59


   Last Admin: 02/07/19 13:59 Dose:  110 mls/hr


Methylprednisolone (Solu-Medrol)  20 mg IVP Q8H [facility]


   Last Admin: 02/07/19 06:41 Dose:  20 mg


Metoprolol Tartrate (Lopressor)  25 mg PO BID [facility]


   Last Admin: 02/07/19 09:51 Dose:  25 mg


Mupirocin (Bactroban Ointment)  0 gm TOP BID [facility]


Ondansetron HCl (Zofran Inj)  4 mg IVP Q4H PRN


   PRN Reason: Nausea/Vomiting


Pantoprazole Sodium (Protonix Ec Tab)  40 mg PO 0600 [facility]


   Last Admin: 02/07/19 06:41 Dose:  40 mg


Silver Sulfadiazine (Silvadene 1% 25 Gm)  0 gm TP BID [facility]


   Last Admin: 02/07/19 13:52 Dose:  25 gm











Physical Exam





- Constitutional


Appears: Non-toxic, No Acute Distress





- Head Exam


Head Exam: ATRAUMATIC, NORMOCEPHALIC





- Eye Exam


Eye Exam: EOMI, Normal appearance, PERRL





- ENT Exam


ENT Exam: Mucous Membranes Moist





- Respiratory Exam


Respiratory Exam: Clear to Auscultation Bilateral, NORMAL BREATHING PATTERN.  

absent: Accessory Muscle Use, Respiratory Distress





- Cardiovascular Exam


Cardiovascular Exam: Tachycardia, Irregular Rhythm, +S1, +S2





- GI/Abdominal Exam


GI & Abdominal Exam: Soft.  absent: Rebound, Rigid, Tenderness





- Extremities Exam


Additional comments: 





lymphedema with chronic venous stasis changes 


wound on left and right thigh with no drainage 





- Neurological Exam


Neurological exam: Alert, CN II-XII Intact, Oriented x3





- Skin


Skin Exam: Dry, Warm





Results





- Vital Signs


Recent Vital Signs: 


                                Last Vital Signs











Temp  98.8 F   02/07/19 04:00


 


Pulse  102 H  02/07/19 09:51


 


Resp  24   02/07/19 13:35


 


BP  135/60   02/07/19 13:54


 


Pulse Ox  94 L  02/07/19 13:35














- Labs


Result Diagrams: 


                                 02/07/19 06:10





                                 02/07/19 06:10


Labs: 


                         Laboratory Results - last 24 hr











  02/07/19 02/07/19 02/07/19





  05:00 06:10 06:10


 


WBC   6.5  D 


 


RBC   4.33 


 


Hgb   8.3 L 


 


Hct   32.6 L 


 


MCV   75.3 L 


 


MCH   19.2 L 


 


MCHC   25.5 L 


 


RDW   20.5 H 


 


Plt Count   236 


 


MPV   9.3 


 


Neut % (Auto)   87.7 H 


 


Lymph % (Auto)   9.4 L 


 


Mono % (Auto)   2.9 


 


Eos % (Auto)   0.0 L 


 


Baso % (Auto)   0.0 


 


Lymph # (Auto)   0.6 L 


 


Mono # (Auto)   0.2 


 


Eos # (Auto)   0.0 


 


Baso # (Auto)   0.00 


 


Absolute Neuts (auto)   5.72 


 


APTT   


 


pCO2  68 H  


 


pO2  72.0 L  


 


HCO3  36.7 H  


 


ABG pH  7.34 L  


 


ABG Total CO2  38.8 H  


 


ABG O2 Saturation  96.4  


 


ABG O2 Content  10.7 L  


 


ABG Base Excess  9.5 H  


 


ABG Hemoglobin  8.1 L  


 


ABG Carboxyhemoglobin  2.3 H  


 


POC ABG HHb (Measured)  3.5  


 


ABG Methemoglobin  1.1  


 


ABG O2 Capacity  11.1 L  


 


Hgb O2 Saturation  93.1 L  


 


FiO2  36.0  


 


Crit Value Called To  Dr nils scott  


 


Crit Value Called By  Avni lott  


 


Blood Gas Notified Time  524  


 


Sodium    139


 


Potassium    4.9


 


Chloride    98


 


Carbon Dioxide    37 H


 


Anion Gap    8 L


 


BUN    27 H


 


Creatinine    1.0


 


Est GFR ( Amer)    > 60


 


Est GFR (Non-Af Amer)    > 60


 


Random Glucose    133 H


 


Calcium    8.2 L


 


Phosphorus    3.6


 


Magnesium    1.9


 


Total Bilirubin    0.7


 


Direct Bilirubin    0.3


 


AST    48


 


ALT    12


 


Alkaline Phosphatase    35 L


 


Total Protein    7.6


 


Albumin    3.4


 


Globulin    4.1


 


Albumin/Globulin Ratio    0.8 L














  02/07/19





  06:10


 


WBC 


 


RBC 


 


Hgb 


 


Hct 


 


MCV 


 


MCH 


 


MCHC 


 


RDW 


 


Plt Count 


 


MPV 


 


Neut % (Auto) 


 


Lymph % (Auto) 


 


Mono % (Auto) 


 


Eos % (Auto) 


 


Baso % (Auto) 


 


Lymph # (Auto) 


 


Mono # (Auto) 


 


Eos # (Auto) 


 


Baso # (Auto) 


 


Absolute Neuts (auto) 


 


APTT  41.0 H


 


pCO2 


 


pO2 


 


HCO3 


 


ABG pH 


 


ABG Total CO2 


 


ABG O2 Saturation 


 


ABG O2 Content 


 


ABG Base Excess 


 


ABG Hemoglobin 


 


ABG Carboxyhemoglobin 


 


POC ABG HHb (Measured) 


 


ABG Methemoglobin 


 


ABG O2 Capacity 


 


Hgb O2 Saturation 


 


FiO2 


 


Crit Value Called To 


 


Crit Value Called By 


 


Blood Gas Notified Time 


 


Sodium 


 


Potassium 


 


Chloride 


 


Carbon Dioxide 


 


Anion Gap 


 


BUN 


 


Creatinine 


 


Est GFR ( Amer) 


 


Est GFR (Non-Af Amer) 


 


Random Glucose 


 


Calcium 


 


Phosphorus 


 


Magnesium 


 


Total Bilirubin 


 


Direct Bilirubin 


 


AST 


 


ALT 


 


Alkaline Phosphatase 


 


Total Protein 


 


Albumin 


 


Globulin 


 


Albumin/Globulin Ratio 














Assessment & Plan





- Assessment and Plan (Free Text)


Assessment: 





58 year old male with past medical history of morbid obesity, leg cellulitis, 

and chronic lymphedema, who presents to the ED with shortness of breath, now 

admitted to ICU for management of hypercapnic respiratory failure, also found to

have bilateral lower extremity wounds. 


Plan: 





- no surgical intervention indicated at this time 


- silvadene topical BID, optifoam 


- air mattress


- Tylenol for pain 


- PT 


- further management per ICU team 


- further recommendations per Dr. Umang Resendez PGY-1





- Date & Time


Date: 02/07/19


Time: 11:30

## 2019-02-07 NOTE — PN
DATE:  02/07/2019



Covering for Dr. Johnny Cardona.



SUBJECTIVE:  The patient denies any chest pain.  His breathing is

comfortable on nasal O2.  He is experiencing productive cough.



PHYSICAL EXAMINATION

VITAL SIGNS:  Blood pressure 151/77, heart rate 102, and respirations 18. 

The patient is afebrile.

HEENT:  Pale conjunctivae.

CHEST:  Absent breath sounds over the bases.

HEART:  S1 and S2, regular.

ABDOMEN:  Soft.

EXTREMITIES:  2+ pitting edema.



LABORATORY DATA:  Today's hemoglobin and hematocrit 8.3 and 32.6, white

count and platelet count are within normal limits.



SMA-7:  Sodium 139, potassium 4.9, chloride 98, CO2 of 37, glucose 133, BUN

27, creatinine 1.0.  Today's PTT is 41.0.



Admitting EKG revealed atrial fibrillation with rapid ventricular response

at least 110 with poor R-wave progression, PVCs versus _____.



Echocardiographic study revealed moderate concentric LVH with normal

ejection fraction, moderate to severe aortic insufficiency with moderate

dilated aortic root.



Venous Doppler lower extremity, no evidence of DVT.



Ventilation-perfusion scan, low probability for pulmonary embolism.



Chest x-rays revealed cardiomegaly with moderate CHF.



ASSESSMENT

1.  New onset atrial fibrillation.

2.  Moderate to severe aortic insufficiency.

3.  Morbid obesity and sleep apnea.

4.  Anemia.

5.  Mild pulmonary hypertension.



PLAN:  Continue intravenous heparin and a therapeutic regimen for atrial

fibrillation.  Continue IV doxycycline 100 mg every 12 hours.  Continue

iron sucrose infusion.  Continue intravenous Lasix at 20 mg _____, Lipitor

20 mg once a day, Lopressor 25 mg twice a day, Solu-Medrol 20 mg every 8

hours.  Following the patient's recovery, cardiac catheterization will be

considered with _____ the patient for aortic valve procedure or replacement

in a concomitant coronary artery disease.







__________________________________________

Vladislav Delgado MD





DD:  02/07/2019 12:33:30

DT:  02/07/2019 15:15:15

Job # [id number]

## 2019-02-08 LAB
ALBUMIN (PEP): 2.9 G/DL (ref 3.8–4.8)
ALBUMIN SERPL-MCNC: 3.6 G/DL (ref 3–4.8)
ALBUMIN/GLOB SERPL: 0.8 {RATIO} (ref 1.1–1.8)
ALPHA-1-GLOBULIN (PEP): 0.4 G/DL (ref 0.2–0.3)
ALT SERPL-CCNC: 10 U/L (ref 7–56)
AST SERPL-CCNC: 39 U/L (ref 17–59)
BASOPHILS # BLD AUTO: 0 K/MM3 (ref 0–2)
BASOPHILS NFR BLD: 0 % (ref 0–3)
BILIRUB DIRECT SERPL-MCNC: 0.3 MG/DL (ref 0–0.4)
BUN SERPL-MCNC: 24 MG/DL (ref 7–21)
CALCIUM SERPL-MCNC: 8.3 MG/DL (ref 8.4–10.5)
EOSINOPHIL # BLD: 0 10*3/UL (ref 0–0.7)
EOSINOPHIL NFR BLD: 0 % (ref 1.5–5)
ERYTHROCYTE [DISTWIDTH] IN BLOOD BY AUTOMATED COUNT: 20.5 % (ref 11.5–14.5)
GFR NON-AFRICAN AMERICAN: > 60
HGB BLD-MCNC: 8.9 G/DL (ref 14–18)
LYMPHOCYTES # BLD: 1 10*3/UL (ref 1.2–3.4)
LYMPHOCYTES NFR BLD AUTO: 14.7 % (ref 22–35)
MCH RBC QN AUTO: 19.3 PG (ref 25–35)
MCHC RBC AUTO-ENTMCNC: 25.5 G/DL (ref 31–37)
MCV RBC AUTO: 75.5 FL (ref 80–105)
MONOCYTES # BLD AUTO: 0.6 10*3/UL (ref 0.1–0.6)
MONOCYTES NFR BLD: 8.7 % (ref 1–6)
PLATELET # BLD: 243 10^3/UL (ref 120–450)
PMV BLD AUTO: 9.4 FL (ref 7–11)
RBC # BLD AUTO: 4.62 10^6/UL (ref 3.5–6.1)
WBC # BLD AUTO: 6.7 10^3/UL (ref 4.5–11)

## 2019-02-08 RX ADMIN — HEPARIN SODIUM PRN MLS/HR: 10000 INJECTION, SOLUTION INTRAVENOUS at 14:05

## 2019-02-08 RX ADMIN — SILVER SULFADIAZINE SCH GM: 10 CREAM TOPICAL at 09:41

## 2019-02-08 RX ADMIN — IPRATROPIUM BROMIDE AND ALBUTEROL SULFATE SCH ML: .5; 3 SOLUTION RESPIRATORY (INHALATION) at 13:16

## 2019-02-08 RX ADMIN — MUPIROCIN SCH APPLIC: 20 OINTMENT TOPICAL at 09:41

## 2019-02-08 RX ADMIN — IPRATROPIUM BROMIDE AND ALBUTEROL SULFATE SCH ML: .5; 3 SOLUTION RESPIRATORY (INHALATION) at 20:50

## 2019-02-08 RX ADMIN — METHYLPREDNISOLONE SODIUM SUCCINATE SCH MG: 40 INJECTION, POWDER, FOR SOLUTION INTRAMUSCULAR; INTRAVENOUS at 05:30

## 2019-02-08 RX ADMIN — IPRATROPIUM BROMIDE AND ALBUTEROL SULFATE SCH ML: .5; 3 SOLUTION RESPIRATORY (INHALATION) at 02:42

## 2019-02-08 RX ADMIN — METHYLPREDNISOLONE SODIUM SUCCINATE SCH MG: 40 INJECTION, POWDER, FOR SOLUTION INTRAMUSCULAR; INTRAVENOUS at 14:00

## 2019-02-08 RX ADMIN — IPRATROPIUM BROMIDE AND ALBUTEROL SULFATE SCH ML: .5; 3 SOLUTION RESPIRATORY (INHALATION) at 07:24

## 2019-02-08 RX ADMIN — SILVER SULFADIAZINE SCH GM: 10 CREAM TOPICAL at 18:03

## 2019-02-08 RX ADMIN — PANTOPRAZOLE SODIUM SCH MG: 40 TABLET, DELAYED RELEASE ORAL at 05:31

## 2019-02-08 RX ADMIN — METHYLPREDNISOLONE SODIUM SUCCINATE SCH MG: 40 INJECTION, POWDER, FOR SOLUTION INTRAMUSCULAR; INTRAVENOUS at 21:45

## 2019-02-08 NOTE — PN
DATE:  02/08/2019



I am seeing the patient for Dr. Mooney today.



LOCATION:  The patient is in the [facility] in [place], in

the Intensive Care Unit.  The patient is in room 128, bed 7.



SUBJECTIVE:  THE PATIENT HAS NO KNOWN ALLERGIES, but he was admitted

through the emergency room.  The patient had presented with some

respiratory distress and superficial abrasions of the skin.  The patient

has past history of hypertension, chronic lung disease.  The patient has

history of gastritis; also the patient has history of severe anemia in the

past.  The patient is seen this morning.  He is able to converse and he

wants to know what his condition is.  The patient is advised that the

patient is waiting for further acute care and then the patient needs

subacute care in a facility.



PHYSICAL EXAMINATION:

GENERAL:  This morning; his weight at this time goes to more than 500

pounds.  His height is 6 feet 5 inches.  He is a gentleman, who has morbid

obesity.  The patient has history of severe sleep apnea in addition.

VITAL SIGNS:  His vital signs as mentioned his pulse is 87, blood pressure

168/80, he has normal respiration, but he does have the BiPAP to help with

his breathing.

LUNGS:  Breath sounds are diminished diffusely.

HEART:  Normal sinus rhythm.  S1 and S2 present.

ABDOMEN:  Soft.  Obesity present.

CENTRAL NERVOUS SYSTEM:  The patient is conscious, rational, and oriented. 

No focal deficits are evident.



LABORATORY DATA:  Blood work, his hemoglobin is 8.9 that represents the

iron deficiency anemia and his platelet count 243,000.



MEDICATIONS:  The patient's medication list consists of mupirocin for the

ulcerations.  The patient gets Colace.  The patient is on doxycycline 100

mg twice a day.  The patient is on vitamin D 50,000 units once a week.  The

patient is on heparin drip for prophylaxis.  The patient is on iron

treatment that is Venofer.  The patient gets Lasix 40 mg every 8 hours. 

The patient also gets Lipitor 40 mg daily, metoprolol 25 mg b.i.d.  The

patient gets pantoprazole 40 mg daily, Silvadene cream for skin erosions. 

The patient is on Solu-Medrol 20 mg IV every 8 hours at this point; the

patient was on a high dose.



ASSESSMENT AND PLAN:  His overall condition is unstable at this time.  The

patient is improved and the patient is acutely treated.  The patient is in

the ICU.  We will followup.







__________________________________________

Bob Guillaume MD





DD:  02/08/2019 8:44:14

DT:  02/08/2019 10:39:18

Job # [id number]

[email or web address]

## 2019-02-08 NOTE — CP.PCM.PN
<Tacos Murphy - Last Filed: 02/08/19 13:17>





Subjective





- Date & Time of Evaluation


Date of Evaluation: 02/08/19


Time of Evaluation: 07:00





- Subjective


Subjective: 





ID Progress Note





Patient seen and examined. Patient admits to improvement in breathing, with 

minimal cough. No fevers. 





Objective





- Vital Signs/Intake and Output


Vital Signs (last 24 hours): 


                                        











Temp Pulse Resp BP Pulse Ox


 


 97 F L  72   20   157/72 H  94 L


 


 02/08/19 08:00  02/08/19 09:42  02/08/19 08:00  02/08/19 09:42  02/08/19 08:00








Intake and Output: 


                                        











 02/08/19 02/08/19





 06:59 18:59


 


Intake Total 240 


 


Balance 240 














- Medications


Medications: 


                               Current Medications





Acetaminophen (Tylenol 325mg Tab)  650 mg PO Q6 PRN


   PRN Reason: TEMP>=99.5F


   Last Admin: 02/06/19 02:33 Dose:  650 mg


Acetaminophen (Tylenol 650 Mg Supp)  650 mg RC Q6H PRN


   PRN Reason: TEMP>=99.5F


Albuterol/Ipratropium (Duoneb 3 Mg/0.5 Mg (3 Ml) Ud)  3 ml IH [id number] [facility]


   Last Admin: 02/08/19 07:24 Dose:  3 ml


Atorvastatin Calcium (Lipitor)  40 mg PO DIN [facility]


   Last Admin: 02/07/19 17:09 Dose:  40 mg


Docusate Sodium (Colace)  100 mg PO TID [facility]


   Last Admin: 02/08/19 09:41 Dose:  100 mg


Ergocalciferol (Drisdol 50,000 Intl Units Cap)  1 cap PO Q7D [facility]


   Last Admin: 02/07/19 09:51 Dose:  1 cap


Furosemide (Lasix)  40 mg IVP Q8H [facility]


   Last Admin: 02/08/19 05:31 Dose:  40 mg


Doxycycline Hyclate 100 mg/ (Sodium Chloride)  100 mls @ 100 mls/hr IVPB Q12 

[facility]; Protocol


   Last Admin: 02/07/19 21:33 Dose:  100 mls/hr


Heparin Sodium/Sodium Chloride (Heparin 42794 Units/250ml 1/2 Normal Saline)  

25,000 units in 250 mls @ 20.02 mls/hr IV .[id number] PRN; Protocol


   PRN Reason: ADJUST RATE PER PROTOCOL


   Last Admin: 02/07/19 21:23 Dose:  7.25 units/kg/hr, 17.594 mls/hr


Iron Sucrose 200 mg/ Sodium (Chloride)  110 mls @ 110 mls/hr IVPB DAILY [facility]


   Stop: 02/11/19 10:59


   Last Admin: 02/07/19 13:59 Dose:  110 mls/hr


Methylprednisolone (Solu-Medrol)  20 mg IVP Q8H [facility]


   Last Admin: 02/08/19 05:30 Dose:  20 mg


Metoprolol Tartrate (Lopressor)  25 mg PO BID [facility]


   Last Admin: 02/08/19 09:42 Dose:  25 mg


Mupirocin (Bactroban Ointment)  0 gm TOP DAILY [facility]


   Last Admin: 02/08/19 09:41 Dose:  1 applic


Ondansetron HCl (Zofran Inj)  4 mg IVP Q4H PRN


   PRN Reason: Nausea/Vomiting


Pantoprazole Sodium (Protonix Ec Tab)  40 mg PO 0600 [facility]


   Last Admin: 02/08/19 05:31 Dose:  40 mg


Silver Sulfadiazine (Silvadene 1% 25 Gm)  0 gm TP BID [facility]


   Last Admin: 02/08/19 09:41 Dose:  25 gm











- Labs


Labs: 


                                        





                                 02/08/19 05:50 





                                 02/08/19 05:50 





                                        











PT  18.5 SECONDS (9.4-12.5)  H  02/05/19  03:10    


 


INR  1.67   02/05/19  03:10    


 


APTT  61.9 Seconds (26.9-38.3)  H  02/08/19  05:50    














- Constitutional


Appears: Non-toxic, No Acute Distress





- Head Exam


Head Exam: ATRAUMATIC, NORMAL INSPECTION, NORMOCEPHALIC





- ENT Exam


ENT Exam: Mucous Membranes Dry





- Respiratory Exam


Respiratory Exam: Decreased Breath Sounds, NORMAL BREATHING PATTERN





- Cardiovascular Exam


Cardiovascular Exam: RRR, +S1, +S2





- GI/Abdominal Exam


GI & Abdominal Exam: Soft, Normal Bowel Sounds.  absent: Tenderness





- Extremities Exam


Additional comments: 





B/l lower legs bandaged. No erythema. Edema +1 b/l 





- Neurological Exam


Neurological Exam: Alert, Awake, Oriented x3





- Psychiatric Exam


Psychiatric exam: Normal Affect, Normal Mood





- Skin


Skin Exam: Intact, Warm


Additional comments: 





Chronic lymphadema skin changes of lower extremities 








Assessment and Plan





- Assessment and Plan (Free Text)


Plan: 





Acute Bronchitis 


A-fib


Hx of chronic lympedema 


Hx of HTN


Hx of morbid obesity


Hx of lower extremity cellulitis with Actinobacter baumanii





Plan


No evidence of lower extremity infection


Continue Doxycycline for acute bronchitis


Blood cultures negative


Continue to monitor closely 





Jeffrey, PGY-3





<Aron Grier S - Last Filed: 02/08/19 14:15>





Objective





- Vital Signs/Intake and Output


Vital Signs (last 24 hours): 


                                        











Temp Pulse Resp BP Pulse Ox


 


 97 F L  90   20   157/72 H  94 L


 


 02/08/19 08:00  02/08/19 10:00  02/08/19 08:00  02/08/19 09:42  02/08/19 08:00








Intake and Output: 


                                        











 02/08/19 02/08/19





 06:59 18:59


 


Intake Total 240 250


 


Balance 240 250














- Medications


Medications: 


                               Current Medications





Acetaminophen (Tylenol 325mg Tab)  650 mg PO Q6 PRN


   PRN Reason: TEMP>=99.5F


   Last Admin: 02/06/19 02:33 Dose:  650 mg


Acetaminophen (Tylenol 650 Mg Supp)  650 mg RC Q6H PRN


   PRN Reason: TEMP>=99.5F


Albuterol/Ipratropium (Duoneb 3 Mg/0.5 Mg (3 Ml) Ud)  3 ml IH [id number] [facility]


   Last Admin: 02/08/19 13:16 Dose:  3 ml


Atorvastatin Calcium (Lipitor)  40 mg PO DIN [facility]


   Last Admin: 02/07/19 17:09 Dose:  40 mg


Docusate Sodium (Colace)  100 mg PO TID [facility]


   Last Admin: 02/08/19 09:41 Dose:  100 mg


Ergocalciferol (Drisdol 50,000 Intl Units Cap)  1 cap PO Q7D [facility]


   Last Admin: 02/07/19 09:51 Dose:  1 cap


Furosemide (Lasix)  40 mg IVP Q8H [facility]


   Last Admin: 02/08/19 05:31 Dose:  40 mg


Doxycycline Hyclate 100 mg/ (Sodium Chloride)  100 mls @ 100 mls/hr IVPB Q12 

[facility]; Protocol


   Last Admin: 02/08/19 11:00 Dose:  100 mls/hr


Heparin Sodium/Sodium Chloride (Heparin 21013 Units/250ml 1/2 Normal Saline)  

25,000 units in 250 mls @ 20.02 mls/hr IV .[id number] PRN; Protocol


   PRN Reason: ADJUST RATE PER PROTOCOL


   Last Admin: 02/08/19 14:05 Dose:  7.25 units/kg/hr, 17.594 mls/hr


Iron Sucrose 200 mg/ Sodium (Chloride)  110 mls @ 110 mls/hr IVPB DAILY [facility]


   Stop: 02/11/19 10:59


   Last Admin: 02/08/19 11:00 Dose:  110 mls/hr


Methylprednisolone (Solu-Medrol)  20 mg IVP Q8H [facility]


   Last Admin: 02/08/19 05:30 Dose:  20 mg


Metoprolol Tartrate (Lopressor)  25 mg PO BID [facility]


   Last Admin: 02/08/19 09:42 Dose:  25 mg


Mupirocin (Bactroban Ointment)  0 gm TOP DAILY [facility]


   Last Admin: 02/08/19 09:41 Dose:  1 applic


Ondansetron HCl (Zofran Inj)  4 mg IVP Q4H PRN


   PRN Reason: Nausea/Vomiting


Pantoprazole Sodium (Protonix Ec Tab)  40 mg PO 0600 [facility]


   Last Admin: 02/08/19 05:31 Dose:  40 mg


Silver Sulfadiazine (Silvadene 1% 25 Gm)  0 gm TP BID [facility]


   Last Admin: 02/08/19 09:41 Dose:  25 gm











- Labs


Labs: 


                                        





                                 02/08/19 05:50 





                                 02/08/19 05:50 





                                        











PT  18.5 SECONDS (9.4-12.5)  H  02/05/19  03:10    


 


INR  1.67   02/05/19  03:10    


 


APTT  63.5 Seconds (26.9-38.3)  H  02/08/19  12:15    














Assessment and Plan





- Assessment and Plan (Free Text)


Plan: 





Infectious diseases Attending Physician Attestation


Patient seen and examined, discussed with medical resident. I have reviewed the 

patient's history of present illness, past medical, social, personal and family 

histories, pertinent physical exam findings, course so far in this hospital 

admission, pertinent laboratory and imaging results. I agree with the above 

findings, assessment and plan. In addition, continue PO Doxycycline for acute 

bronchitis to complete 3-5 days.

## 2019-02-08 NOTE — CP.PCM.PCO
Physician Communication Note





- Physician Communication Note


Physician Communication Note: Card drip stopped,on heparin IV drip,solumed 20 iv

q12,wound cx legs pendin

## 2019-02-08 NOTE — PN
DATE:  02/08/2019



SUBJECTIVE:  The patient is still experiencing productive cough.  He is

mildly short of breath, but comfortable on nasal O2.



PHYSICAL EXAMINATION:

VITAL SIGNS:  Blood pressure 157/72, heart rate 72, temperature 97 and

respirations 20.

HEENT:  Mild facial edema.

CHEST:  Absent breath sounds over the bases.

HEART:  S1 and S2, regular.

ABDOMEN:  Soft.

EXTREMITIES:  Significant _____ edema.



LABORATORY DATA:  Hemoglobin and hematocrit 8.9 and 34.5, white count and

platelet count are within normal limits.



Today's SMA-7:  Sodium 139, potassium 4.8, chloride 94, CO2 of 38, glucose

111, BUN 24, creatinine 1.



Today's INR 63.5.



ASSESSMENT:

1.  New onset atrial fibrillation.

2.  Moderate to severe aortic stenosis.

3.  Morbid obesity and sleep apnea.

4.  Mild pulmonary hypertension.

5.  Anemia.



RECOMMENDATIONS:  Continue intravenous doxycycline 100 mg every 12 hours,

albuterol inhaler every 6 hours.  Intravenous heparin and therapeutic

regimen,  Continue Lasix 40 mg intravenously every 8 hours, Lopressor 25 mg

twice a day and start Coumadin therapy if there is no contraindication.







__________________________________________

Vladislav Delgado MD





DD:  02/08/2019 13:18:55

DT:  02/08/2019 16:08:18

Job # [id number]

## 2019-02-08 NOTE — CP.PCM.PN
<Adam Ceballos - Last Filed: 02/08/19 13:36>





Subjective





- Date & Time of Evaluation


Date of Evaluation: 02/08/19


Time of Evaluation: 13:36





- Subjective


Subjective: 


Podiatry consult note for Dr. Huffman





57 y/o male seen and evaluated in the ICU for bilateral leg wounds. Patient 

alert, awake, and in much better spirits today.  As per nursing, no acute 

overnight events.








Objective





- Vital Signs/Intake and Output


Vital Signs (last 24 hours): 


                                        











Temp Pulse Resp BP Pulse Ox


 


 97 F L  90   20   157/72 H  94 L


 


 02/08/19 08:00  02/08/19 10:00  02/08/19 08:00  02/08/19 09:42  02/08/19 08:00








Intake and Output: 


                                        











 02/08/19 02/08/19





 06:59 18:59


 


Intake Total 240 


 


Balance 240 














- Medications


Medications: 


                               Current Medications





Acetaminophen (Tylenol 325mg Tab)  650 mg PO Q6 PRN


   PRN Reason: TEMP>=99.5F


   Last Admin: 02/06/19 02:33 Dose:  650 mg


Acetaminophen (Tylenol 650 Mg Supp)  650 mg RC Q6H PRN


   PRN Reason: TEMP>=99.5F


Albuterol/Ipratropium (Duoneb 3 Mg/0.5 Mg (3 Ml) Ud)  3 ml IH [id number] [facility]


   Last Admin: 02/08/19 13:16 Dose:  3 ml


Atorvastatin Calcium (Lipitor)  40 mg PO DIN [facility]


   Last Admin: 02/07/19 17:09 Dose:  40 mg


Docusate Sodium (Colace)  100 mg PO TID [facility]


   Last Admin: 02/08/19 09:41 Dose:  100 mg


Ergocalciferol (Drisdol 50,000 Intl Units Cap)  1 cap PO Q7D [facility]


   Last Admin: 02/07/19 09:51 Dose:  1 cap


Furosemide (Lasix)  40 mg IVP Q8H [facility]


   Last Admin: 02/08/19 05:31 Dose:  40 mg


Doxycycline Hyclate 100 mg/ (Sodium Chloride)  100 mls @ 100 mls/hr IVPB Q12 

[facility]; Protocol


   Last Admin: 02/08/19 11:00 Dose:  100 mls/hr


Heparin Sodium/Sodium Chloride (Heparin 82668 Units/250ml 1/2 Normal Saline)  

25,000 units in 250 mls @ 20.02 mls/hr IV .[id number] PRN; Protocol


   PRN Reason: ADJUST RATE PER PROTOCOL


   Last Admin: 02/07/19 21:23 Dose:  7.25 units/kg/hr, 17.594 mls/hr


Iron Sucrose 200 mg/ Sodium (Chloride)  110 mls @ 110 mls/hr IVPB DAILY [facility]


   Stop: 02/11/19 10:59


   Last Admin: 02/08/19 11:00 Dose:  110 mls/hr


Methylprednisolone (Solu-Medrol)  20 mg IVP Q8H [facility]


   Last Admin: 02/08/19 05:30 Dose:  20 mg


Metoprolol Tartrate (Lopressor)  25 mg PO BID [facility]


   Last Admin: 02/08/19 09:42 Dose:  25 mg


Mupirocin (Bactroban Ointment)  0 gm TOP DAILY [facility]


   Last Admin: 02/08/19 09:41 Dose:  1 applic


Ondansetron HCl (Zofran Inj)  4 mg IVP Q4H PRN


   PRN Reason: Nausea/Vomiting


Pantoprazole Sodium (Protonix Ec Tab)  40 mg PO 0600 [facility]


   Last Admin: 02/08/19 05:31 Dose:  40 mg


Silver Sulfadiazine (Silvadene 1% 25 Gm)  0 gm TP BID [facility]


   Last Admin: 02/08/19 09:41 Dose:  25 gm











- Labs


Labs: 


                                        





                                 02/08/19 05:50 





                                 02/08/19 05:50 





                                        











PT  18.5 SECONDS (9.4-12.5)  H  02/05/19  03:10    


 


INR  1.67   02/05/19  03:10    


 


APTT  63.5 Seconds (26.9-38.3)  H  02/08/19  12:15    














- Constitutional


Appears: Well, Non-toxic, No Acute Distress





- Head Exam


Head Exam: ATRAUMATIC, NORMOCEPHALIC





- Extremities Exam


Additional comments: 


Bilateral Lower Extremity Exam





VASC: DP and PT non-palpable secondary to significant lymphedema, TG within 

normal limits





DERM





RIGHT: superficial wounds noted to the lateral aspect of the right leg and to 

the posterior thigh, 100% granular base, no probe to bone, no malodor, minimal 

drainage, no signs of infection noted





LEFT: small superficial wound noted to the medial aspect of the left leg, fibro-

granular base, no probe to bone, no malodor, minimal drainage, no signs of 

infection noted, significant lymphedema noted bilaterally, 





NEURO: unable to assess, patient responding to pain stimuli





ORTHO: pain with movement and range of motion, pain on palpation to wounds








- Neurological Exam


Neurological Exam: Alert, Awake, Oriented x3





- Psychiatric Exam


Psychiatric exam: Normal Affect, Normal Mood





Assessment and Plan





- Assessment and Plan (Free Text)


Assessment: 


57 y/o male patient seen with superficial wounds to bilateral lower extremity, 

non-infected








Plan: 


Patient seen and evaluated with Dr. Huffman


Plan discussed with attending


Chart, labs and vitals were reviewed, afebrile, absent leukocytosis


Wound cleansed with saline and dressed with bactroban, maxorb, optifoam, and ACE


Patient stable from podiatry standpoint, no intervention required


Podiatry will continue to follow














<Grzegorz Huffman - Last Filed: 02/08/19 14:36>





Objective





- Vital Signs/Intake and Output


Vital Signs (last 24 hours): 


                                        











Temp Pulse Resp BP Pulse Ox


 


 97 F L  90   20   157/72 H  94 L


 


 02/08/19 08:00  02/08/19 10:00  02/08/19 08:00  02/08/19 09:42  02/08/19 08:00








Intake and Output: 


                                        











 02/08/19 02/08/19





 06:59 18:59


 


Intake Total 240 250


 


Balance 240 250














- Medications


Medications: 


                               Current Medications





Acetaminophen (Tylenol 325mg Tab)  650 mg PO Q6 PRN


   PRN Reason: TEMP>=99.5F


   Last Admin: 02/06/19 02:33 Dose:  650 mg


Acetaminophen (Tylenol 650 Mg Supp)  650 mg RC Q6H PRN


   PRN Reason: TEMP>=99.5F


Albuterol/Ipratropium (Duoneb 3 Mg/0.5 Mg (3 Ml) Ud)  3 ml IH [id number] [facility]


   Last Admin: 02/08/19 13:16 Dose:  3 ml


Atorvastatin Calcium (Lipitor)  40 mg PO DIN [facility]


   Last Admin: 02/07/19 17:09 Dose:  40 mg


Docusate Sodium (Colace)  100 mg PO TID [facility]


   Last Admin: 02/08/19 09:41 Dose:  100 mg


Ergocalciferol (Drisdol 50,000 Intl Units Cap)  1 cap PO Q7D [facility]


   Last Admin: 02/07/19 09:51 Dose:  1 cap


Furosemide (Lasix)  40 mg IVP Q8H [facility]


   Last Admin: 02/08/19 05:31 Dose:  40 mg


Doxycycline Hyclate 100 mg/ (Sodium Chloride)  100 mls @ 100 mls/hr IVPB Q12 

[facility]; Protocol


   Last Admin: 02/08/19 11:00 Dose:  100 mls/hr


Heparin Sodium/Sodium Chloride (Heparin 16277 Units/250ml 1/2 Normal Saline)  

25,000 units in 250 mls @ 20.02 mls/hr IV .[id number] [facility]N; Protocol


   PRN Reason: ADJUST RATE PER PROTOCOL


   Last Admin: 02/08/19 14:05 Dose:  7.25 units/kg/hr, 17.594 mls/hr


Iron Sucrose 200 mg/ Sodium (Chloride)  110 mls @ 110 mls/hr IVPB DAILY [facility]


   Stop: 02/11/19 10:59


   Last Admin: 02/08/19 11:00 Dose:  110 mls/hr


Methylprednisolone (Solu-Medrol)  20 mg IVP Q8H [facility]


   Last Admin: 02/08/19 05:30 Dose:  20 mg


Metoprolol Tartrate (Lopressor)  25 mg PO BID [facility]


   Last Admin: 02/08/19 09:42 Dose:  25 mg


Mupirocin (Bactroban Ointment)  0 gm TOP DAILY [facility]


   Last Admin: 02/08/19 09:41 Dose:  1 applic


Ondansetron HCl (Zofran Inj)  4 mg IVP Q4H PRN


   PRN Reason: Nausea/Vomiting


Pantoprazole Sodium (Protonix Ec Tab)  40 mg PO 0600 [facility]


   Last Admin: 02/08/19 05:31 Dose:  40 mg


Silver Sulfadiazine (Silvadene 1% 25 Gm)  0 gm TP BID [facility]


   Last Admin: 02/08/19 09:41 Dose:  25 gm











- Labs


Labs: 


                                        





                                 02/08/19 05:50 





                                 02/08/19 05:50 





                                        











PT  18.5 SECONDS (9.4-12.5)  H  02/05/19  03:10    


 


INR  1.67   02/05/19  03:10    


 


APTT  63.5 Seconds (26.9-38.3)  H  02/08/19  12:15    














Attending/Attestation





- Attestation


I have personally seen and examined this patient.: Yes


I have fully participated in the care of the patient.: Yes


I have reviewed all pertinent clinical information, including history, physical 

exam and plan: Yes

## 2019-02-09 LAB
ALBUMIN SERPL-MCNC: 3.3 G/DL (ref 3–4.8)
ALBUMIN/GLOB SERPL: 0.8 {RATIO} (ref 1.1–1.8)
ALT SERPL-CCNC: 13 U/L (ref 7–56)
AST SERPL-CCNC: 40 U/L (ref 17–59)
BASOPHILS # BLD AUTO: 0 K/MM3 (ref 0–2)
BASOPHILS NFR BLD: 0 % (ref 0–3)
BILIRUB DIRECT SERPL-MCNC: 0.2 MG/DL (ref 0–0.4)
BUN SERPL-MCNC: 22 MG/DL (ref 7–21)
CALCIUM SERPL-MCNC: 8.4 MG/DL (ref 8.4–10.5)
EOSINOPHIL # BLD: 0 10*3/UL (ref 0–0.7)
EOSINOPHIL NFR BLD: 0 % (ref 1.5–5)
ERYTHROCYTE [DISTWIDTH] IN BLOOD BY AUTOMATED COUNT: 20.7 % (ref 11.5–14.5)
GFR NON-AFRICAN AMERICAN: > 60
HGB BLD-MCNC: 8.7 G/DL (ref 14–18)
INR PPP: 1.56
LYMPHOCYTES # BLD: 1.2 10*3/UL (ref 1.2–3.4)
LYMPHOCYTES NFR BLD AUTO: 16.3 % (ref 22–35)
MCH RBC QN AUTO: 19.1 PG (ref 25–35)
MCHC RBC AUTO-ENTMCNC: 25.7 G/DL (ref 31–37)
MCV RBC AUTO: 74.5 FL (ref 80–105)
MONOCYTES # BLD AUTO: 0.5 10*3/UL (ref 0.1–0.6)
MONOCYTES NFR BLD: 6.6 % (ref 1–6)
PLATELET # BLD: 243 10^3/UL (ref 120–450)
PMV BLD AUTO: 9.3 FL (ref 7–11)
PROTHROMBIN TIME: 17.3 SECONDS (ref 9.4–12.5)
RBC # BLD AUTO: 4.55 10^6/UL (ref 3.5–6.1)
WBC # BLD AUTO: 7.3 10^3/UL (ref 4.5–11)

## 2019-02-09 RX ADMIN — CLOTRIMAZOLE AND BETAMETHASONE DIPROPIONATE SCH MG: 10; .5 CREAM TOPICAL at 19:54

## 2019-02-09 RX ADMIN — MUPIROCIN SCH APPLIC: 20 OINTMENT TOPICAL at 10:53

## 2019-02-09 RX ADMIN — METHYLPREDNISOLONE SODIUM SUCCINATE SCH MG: 40 INJECTION, POWDER, FOR SOLUTION INTRAMUSCULAR; INTRAVENOUS at 21:03

## 2019-02-09 RX ADMIN — HEPARIN SODIUM PRN MLS/HR: 10000 INJECTION, SOLUTION INTRAVENOUS at 18:01

## 2019-02-09 RX ADMIN — METHYLPREDNISOLONE SODIUM SUCCINATE SCH MG: 40 INJECTION, POWDER, FOR SOLUTION INTRAMUSCULAR; INTRAVENOUS at 05:31

## 2019-02-09 RX ADMIN — IPRATROPIUM BROMIDE AND ALBUTEROL SULFATE SCH ML: .5; 3 SOLUTION RESPIRATORY (INHALATION) at 19:41

## 2019-02-09 RX ADMIN — SILVER SULFADIAZINE SCH GM: 10 CREAM TOPICAL at 18:18

## 2019-02-09 RX ADMIN — IPRATROPIUM BROMIDE AND ALBUTEROL SULFATE SCH ML: .5; 3 SOLUTION RESPIRATORY (INHALATION) at 07:30

## 2019-02-09 RX ADMIN — IPRATROPIUM BROMIDE AND ALBUTEROL SULFATE SCH ML: .5; 3 SOLUTION RESPIRATORY (INHALATION) at 13:14

## 2019-02-09 RX ADMIN — IPRATROPIUM BROMIDE AND ALBUTEROL SULFATE SCH ML: .5; 3 SOLUTION RESPIRATORY (INHALATION) at 02:00

## 2019-02-09 RX ADMIN — SILVER SULFADIAZINE SCH GM: 10 CREAM TOPICAL at 10:50

## 2019-02-09 RX ADMIN — PANTOPRAZOLE SODIUM SCH MG: 40 TABLET, DELAYED RELEASE ORAL at 05:31

## 2019-02-09 RX ADMIN — HEPARIN SODIUM PRN MLS/HR: 10000 INJECTION, SOLUTION INTRAVENOUS at 02:57

## 2019-02-09 RX ADMIN — METHYLPREDNISOLONE SODIUM SUCCINATE SCH MG: 40 INJECTION, POWDER, FOR SOLUTION INTRAMUSCULAR; INTRAVENOUS at 14:00

## 2019-02-09 NOTE — PN
DATE:  02/09/2019



SUBJECTIVE:  The patient is in bed in no acute distress, nontoxic.  Awake

and alert.  States he is doing much better overall.



PHYSICAL EXAMINATION:

VITAL SIGNS:  Temperature is 98, blood pressure is 140/50, respiratory rate

of 18.

HEENT:  Unremarkable.

NECK:  Supple.

LUNGS:  Have decreased breath sounds.

HEART:  Normal S1, S2.

ABDOMEN:  Soft.



LABORATORY EXAMINATION:  Reveals a white count of 7.3, hemoglobin of 8. 

Chemistries reveals a BUN of 22, creatinine 0.7.  Microbiology is noted and

there is MRSA screen is negative.  Review of orders reveals the patient to

be on doxycycline IV.  The patient is also on Solu-Medrol.



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

drums who has super morbid obesity with a BMI of 72 who has acute

bronchitis, atrial fibrillation, chronic lymphedema, hypertension, super

morbid obesity with BMI of 72.  Currently on IV doxycycline by Dr. He.  Maybe able to switch to p.o.  We will discontinue the IV

doxycycline and use p.o. doxycycline, would complete a short course. 

Overall prognosis is poor for this patient who has super morbid obesity. 

Nurse practitioner, _____ communication report is reviewed.  Dr. Huffman's

progress note is reviewed.  No evidence of infection as per Dr. Huffman's

note, description of the legs.  We will follow with you.







__________________________________________

Ray Olvera MD





DD:  02/09/2019 10:28:14

DT:  02/09/2019 10:30:47

Job # [id number]

## 2019-02-09 NOTE — PN
DATE:  02/09/2019



SUBJECTIVE:  The patient is seen still in ICU.  The patient is seen lying

in the bed 7.  The patient is reading newspaper.  The patient appears to be

comfortable, does not appear to be any distress.  The patient states that

his breathing is much better since the day he arrived.  The patient is

awake, responsive, alert.



PHYSICAL EXAMINATION

GENERAL:  The patient is seen lying in the bed.

VITAL SIGNS:  Telemetry shows atrial fibrillation, heart rate in low 100s. 

T max is 98.7 in the last 24 hours.  Telemetry atrial fibrillation.  Blood

pressure in the last 24 hours, systolic blood pressure in 150, 160s, and

180s.  This morning, blood pressure 145/ 55, diastolic blood pressure 80s

and 90s.  Respirations 20, averaging around high 20s to low 30s.  O2 sat

has been averaging around low 90s and under 95%.

INTAKE AND OUTPUT:  On 02/08/2019, intake was 1480, output 3000.  On

02/09/2019, Intake 1060, output 4000.

HEENT:  Head is normocephalic, atraumatic.  HEENT examination shows pinkish

pale conjunctivae.  Anicteric sclerae.  No oropharyngeal lesion.

NECK:  No neck rigidity.  Neck is short and supple.

CHEST:  Symmetrical.

CARDIOPULMONARY:  S1, S2, irregular rhythm.  Positive systolic murmur at

left sternal border, right second intercostal space, left second

intercostal space.

LUNGS:  Shows decreased breath sound at the bases.

ABDOMEN:  Obese.  No palpable hepatosplenomegaly.

GENITALIA:  Male.

RECTAL:  Deferred.

EXTREMITIES:  Bilateral lower extremity massive lymphedema noted.

MUSCULOSKELETAL:  Shows a body mass index of 72.6, daily weight 535.

NEUROLOGIC:  Cranial nerves II-XII limited.  Neuro examination is limited. 

Gait examination is not tested.  The patient is bedridden and lying in the

bed.



DIAGNOSTICS:  On 02/09/2019, WBC 7.3, hemoglobin and hematocrit 8.7, 33.9,

platelet 243.  Granulocytes 77% segs.  PTT 59.6.  Sodium 138, potassium

4.3, chloride 92, CO2 42, BUN 22, creatinine 0.7, GFR greater than 60,

glucose 120, calcium 8.4, phosphorus 3.8, magnesium 1.7.  LFTs are normal. 

MRSA cultures negative.  Blood transfused 1 unit.



IMPRESSION:

1.  New-onset atrial fibrillation.

2.  Super morbid obesity with a body weight of 535 pounds and BMI of 72.

3.  Massive lymphedema of the lower extremity.

4.  Severe gait dysfunction and morbid obesity.

5.  Bilateral lower extremity noninfected superficial wound.

6.  Moderate to severe aortic stenosis.

7.  Possible sleep apnea.

8.  Hypertension.

9.  Microcytic anemia.

10.  Severe respiratory acidosis and hypercarbia and possible CO2 narcosis.

11.  Mild metabolic alkalosis.

12.  Mild prerenal kidney injury.

13.  Iron-deficiency microcytic anemia.

14.  Hypoalbuminemia.

15.  Hypovitaminosis D.

16.  Possible congestive heart failure with elevated proBNP.

17.  Chronic inflammatory reaction.

18.  Status post packed red blood cell transfusion x1.

19.  Left ventricular ejection fraction of 56%.

20.  Concentric left ventricular hypertrophy.

21.  Moderately dilated left atrium.

22.  Moderate-to-severe aortic regurgitation.

23.  Mild mitral regurgitation.

24.  Mild tricuspid regurgitation.

25.  Mild pulmonic regurgitation.

26.  Moderate-to-severe aortic regurgitation, not aortic stenosis.

27.  Moderately dilated aortic root.

28.  Moderately dilated left atrium.

29.  Gait dysfunction.

30.  Bilateral lower extremity chronic lymphedema.

31.  Bilateral lower extremity superficial wound and venous stasis

dermatitis and ulceration.



PLAN:  At this time, the patient has been awaiting telemetry bed.  The

patient has been ordered serial labs.  The patient has been ordered

continuation of aggressive medical management, diuretic continuation,

repeat labs ordered on a regular basis on a daily basis.



CURRENT CONSULTATION:  Gastroenterology Surgery, Hematology/Oncology,

Infectious Disease, Cardiology, Podiatry.



CURRENT MEDICATIONS:  Bactroban cream, Colace 100 three times a day.  The

patient is started on Coumadin 10 mg daily by Cardiology, doxycycline 100

mg p.o. every 12, Drisdol 50,000 weekly, DuoNeb nebulizer treatment,

heparin drip is to be continued till with Coumadin bridging, Venofer 200 mg

daily x5 doses, Lasix 40 mg IV every 8, Lipitor 40 mg daily, Lopressor 25

mg twice a day, Lotrisone cream, Protonix 40 daily for GI prophylaxis,

Silvadene cream to the affected area, Solu-Medrol 20 mg IV every 8, Tylenol

p.o. suppository every 6 p.r.n. and Zofran 4 mg IV every 4 p.r.n.



The patient is awaiting for a telemetry bed.  The patient's further

management will be dependent upon the patient's clinical condition,

hemodynamic status and as per the patient response to therapeutic

intervention, as per the patient's diagnostic test results and as per

recommendation by all the physician involved in the care of the patient. 

Overall prognosis is guarded.  The patient is seen by .  The

patient will be referred to physical therapist.  Overall prognosis guarded

to poor.



The patient has been updated about his condition, diagnosis, test results

and all recommendation by all the physician involved in the care of the

patient in layman's language.  All questions concerned answered.



Dictated and electronically signed, not read.







__________________________________________

Varinder Mooney MD





DD:  02/09/2019 17:12:27

DT:  02/09/2019 17:17:47

Job # [id number]

## 2019-02-09 NOTE — PN
DATE:  02/09/2019



SUBJECTIVE:  The patient Is experiencing dry cough.  He is in atrial

fibrillation with controlled heart rate.  The patient was evaluated earlier

by the surgeon, Dr. Buck and recommended medical management for the

patient's current leg edema and the related changes.



PHYSICAL EXAMINATION:

VITAL SIGNS:  Blood pressure 145/55, heart rate 88, temperature 98,

respiration 20.

HEENT:  Facial edema.

CHEST:  Absent breath sounds over the bases.

HEART:  S1 and S2, regular and distant.

ABDOMEN:  Soft.

EXTREMITIES:  2-3+ edema.



LABORATORY DATA:  Today's hemoglobin and hematocrit 8.7 and 32.9, white

count and platelet count are within normal limits.



Today's SMA-7; sodium 138, potassium 4.2, chloride 92, CO2 of 42, glucose

120, BUN 22 and creatinine 0.7.



ASSESSMENT:

1.  New-onset atrial fibrillation.

2.  Moderate-to-severe aortic insufficiency, and I would like to mention

that it was dictated as aortic stenosis on yesterday's dictation which is

incorrect transcription.

3.  Morbid obesity and sleep apnea.

4.  Anemia.

5.  Mild pulmonary hypertension.



RECOMMENDATIONS:  Continue current doxycycline 100 mg p.o. twice a day,

albuterol inhaler, intravenous heparin and therapeutic regimen, intravenous

iron sucrose, Lasix 40 mg intravenously every 8 hours, Lopressor 25 mg

twice a day, Protonix 40 mg p.o. once a day, Solu-Medrol at 20 mg

intravenously every 8 hours.  I would administer Coumadin 3 mg orally

today.  Follow up _____ INR.







__________________________________________

Vladislav Delgado MD





DD:  02/09/2019 16:57:39

DT:  02/09/2019 20:03:09

Job # [id number]

## 2019-02-09 NOTE — CP.PCM.PN
<Aaliyah Ceballosalberto - Last Filed: 02/09/19 11:29>





Subjective





- Date & Time of Evaluation


Date of Evaluation: 02/09/19


Time of Evaluation: 11:29





- Subjective


Subjective: 


Podiatry consult note for Dr. Huffman





57 y/o male seen and evaluated in the ICU for bilateral leg wounds. Patient 

alert, awake, states he is feeling much better. Patient also states the swelling

in his legs has decreased. Patient is resting comfortably in bed, and denies any

acute overnight events. 








Objective





- Vital Signs/Intake and Output


Vital Signs (last 24 hours): 


                                        











Temp Pulse Resp BP Pulse Ox


 


 98 F   91 H  20   145/55 L  96 


 


 02/08/19 16:00  02/09/19 10:49  02/08/19 16:00  02/09/19 05:30  02/09/19 06:00








Intake and Output: 


                                        











 02/09/19 02/09/19





 06:59 18:59


 


Intake Total 1060 


 


Output Total 4000 


 


Balance -2940 














- Medications


Medications: 


                               Current Medications





Acetaminophen (Tylenol 325mg Tab)  650 mg PO Q6 PRN


   PRN Reason: TEMP>=99.5F


   Last Admin: 02/06/19 02:33 Dose:  650 mg


Acetaminophen (Tylenol 650 Mg Supp)  650 mg RC Q6H PRN


   PRN Reason: TEMP>=99.5F


Albuterol/Ipratropium (Duoneb 3 Mg/0.5 Mg (3 Ml) Ud)  3 ml IH [id number] [facility]


   Last Admin: 02/09/19 07:30 Dose:  3 ml


Atorvastatin Calcium (Lipitor)  40 mg PO DIN [facility]


   Last Admin: 02/08/19 18:02 Dose:  40 mg


Docusate Sodium (Colace)  100 mg PO TID [facility]


   Last Admin: 02/09/19 10:51 Dose:  100 mg


Doxycycline Hyclate (Doryx)  100 mg PO Q12 [facility]; Protocol


   Stop: 02/14/19 22:01


Ergocalciferol (Drisdol 50,000 Intl Units Cap)  1 cap PO Q7D [facility]


   Last Admin: 02/07/19 09:51 Dose:  1 cap


Furosemide (Lasix)  40 mg IVP Q8H [facility]


   Last Admin: 02/09/19 05:30 Dose:  40 mg


Heparin Sodium/Sodium Chloride (Heparin 22648 Units/250ml 1/2 Normal Saline)  

25,000 units in 250 mls @ 20.02 mls/hr IV .[id number] PRN; Protocol


   PRN Reason: ADJUST RATE PER PROTOCOL


   Last Admin: 02/09/19 02:57 Dose:  7.25 units/kg/hr, 17.594 mls/hr


Iron Sucrose 200 mg/ Sodium (Chloride)  110 mls @ 110 mls/hr IVPB DAILY [facility]


   Stop: 02/11/19 10:59


   Last Admin: 02/09/19 11:01 Dose:  110 mls/hr


Methylprednisolone (Solu-Medrol)  20 mg IVP Q8H [facility]


   Last Admin: 02/09/19 05:31 Dose:  20 mg


Metoprolol Tartrate (Lopressor)  25 mg PO BID [facility]


   Last Admin: 02/09/19 10:49 Dose:  25 mg


Mupirocin (Bactroban Ointment)  0 gm TOP DAILY [facility]


   Last Admin: 02/09/19 10:53 Dose:  1 applic


Ondansetron HCl (Zofran Inj)  4 mg IVP Q4H PRN


   PRN Reason: Nausea/Vomiting


Pantoprazole Sodium (Protonix Ec Tab)  40 mg PO 0600 [facility]


   Last Admin: 02/09/19 05:31 Dose:  40 mg


Silver Sulfadiazine (Silvadene 1% 25 Gm)  0 gm TP BID [facility]


   Last Admin: 02/09/19 10:50 Dose:  25 gm











- Labs


Labs: 


                                        





                                 02/09/19 05:00 





                                 02/09/19 05:00 





                                        











PT  18.5 SECONDS (9.4-12.5)  H  02/05/19  03:10    


 


INR  1.67   02/05/19  03:10    


 


APTT  59.6 Seconds (26.9-38.3)  H  02/09/19  05:00    














- Constitutional


Appears: Well, Non-toxic, No Acute Distress





- Head Exam


Head Exam: ATRAUMATIC, NORMOCEPHALIC





- Extremities Exam


Additional comments: 


Bilateral Lower Extremity Exam





VASC: DP and PT non-palpable secondary to significant lymphedema, TG within 

normal limits





DERM





RIGHT: superficial wounds noted to the lateral aspect of the right leg and to 

the posterior thigh, 100% granular base, no probe to bone, no malodor, moderate 

drainage, no signs of infection noted





LEFT: small superficial wound noted to the medial aspect of the left leg,  100% 

granular base, no probe to bone, no malodor, no drainage, no signs of infection 

noted, significant lymphedema noted bilaterally, however it is improving





NEURO: grossly intact now





ORTHO: pain with movement and range of motion, minimal pain on palpation to 

wounds








- Neurological Exam


Neurological Exam: Alert, Awake, Oriented x3





- Psychiatric Exam


Psychiatric exam: Normal Affect, Normal Mood





Assessment and Plan





- Assessment and Plan (Free Text)


Assessment: 


57 y/o male patient seen with superficial wounds to bilateral lower extremity, 

non-infected








Plan: 


Patient seen and evaluated with Dr. Huffman


Plan discussed with attending


Chart, labs and vitals were reviewed, afebrile, absent leukocytosis


Wound cleansed with saline and dressed with bactroban, maxorb, optifoam, and ACE


Patient stable from podiatry standpoint, no intervention required


Podiatry will continue to follow








<Grzegorz Huffman - Last Filed: 02/11/19 09:28>





Objective





- Vital Signs/Intake and Output


Vital Signs (last 24 hours): 


                                        











Temp Pulse Resp BP Pulse Ox


 


 98.5 F   98 H  22   156/75 H  96 


 


 02/11/19 06:00  02/11/19 06:00  02/11/19 06:00  02/11/19 06:00  02/11/19 06:00








Intake and Output: 


                                        











 02/11/19 02/11/19





 06:59 18:59


 


Intake Total 1550 


 


Output Total 8200 


 


Balance -6650 














- Medications


Medications: 


                               Current Medications





Acetaminophen (Tylenol 325mg Tab)  650 mg PO Q6 PRN


   PRN Reason: TEMP>=99.5F


   Last Admin: 02/06/19 02:33 Dose:  650 mg


Acetaminophen (Tylenol 650 Mg Supp)  650 mg RC Q6H PRN


   PRN Reason: TEMP>=99.5F


Albuterol/Ipratropium (Duoneb 3 Mg/0.5 Mg (3 Ml) Ud)  3 ml IH [id number] [facility]


   Last Admin: 02/11/19 07:48 Dose:  3 ml


Atorvastatin Calcium (Lipitor)  40 mg PO DIN [facility]


   Last Admin: 02/10/19 18:38 Dose:  40 mg


Betamethasone/Clotrimazole (Lotrisone)  0 gm TOP BID [facility]


   Last Admin: 02/10/19 18:39 Dose:  1 mg


Docusate Sodium (Colace)  100 mg PO TID [facility]


   Last Admin: 02/10/19 18:34 Dose:  Not Given


Doxycycline Hyclate (Doryx)  100 mg PO Q12 [facility]; Protocol


   Stop: 02/14/19 22:01


   Last Admin: 02/10/19 22:17 Dose:  100 mg


Ergocalciferol (Drisdol 50,000 Intl Units Cap)  1 cap PO Q7D [facility]


   Last Admin: 02/07/19 09:51 Dose:  1 cap


Furosemide (Lasix)  40 mg IVP Q8H [facility]


   Last Admin: 02/11/19 05:29 Dose:  40 mg


Heparin Sodium/Sodium Chloride (Heparin 52768 Units/250ml 1/2 Normal Saline)  

25,000 units in 250 mls @ 20.02 mls/hr IV .[id number] PRN; Protocol


   PRN Reason: ADJUST RATE PER PROTOCOL


   Last Admin: 02/11/19 00:24 Dose:  7.25 units/kg/hr, 17.594 mls/hr


Iron Sucrose 200 mg/ Sodium (Chloride)  110 mls @ 110 mls/hr IVPB DAILY [facility]


   Stop: 02/11/19 10:59


   Last Admin: 02/10/19 10:43 Dose:  110 mls/hr


Methylprednisolone (Solu-Medrol)  20 mg IVP Q8H [facility]


   Last Admin: 02/11/19 05:09 Dose:  20 mg


Metoprolol Tartrate (Lopressor)  25 mg PO BID [facility]


   Last Admin: 02/10/19 18:37 Dose:  25 mg


Mupirocin (Bactroban Ointment)  0 gm TOP DAILY [facility]


   Last Admin: 02/10/19 10:54 Dose:  1 applic


Ondansetron HCl (Zofran Inj)  4 mg IVP Q4H PRN


   PRN Reason: Nausea/Vomiting


Pantoprazole Sodium (Protonix Ec Tab)  40 mg PO 0600 [facility]


   Last Admin: 02/10/19 06:27 Dose:  40 mg


Silver Sulfadiazine (Silvadene 1%)  0 ea TOP BID [facility]


   Last Admin: 02/10/19 18:39 Dose:  1 gm


Warfarin Sodium (Coumadin)  10 mg PO 1800 [facility]; Protocol


   Last Admin: 02/10/19 18:37 Dose:  10 mg











- Labs


Labs: 


                                        





                                 02/11/19 06:10 





                                 02/11/19 06:10 





                                        











PT  17.5 SECONDS (9.4-12.5)  H  02/11/19  06:10    


 


INR  1.55   02/11/19  06:10    


 


APTT  57.1 Seconds (26.9-38.3)  H  02/11/19  06:10    














Attending/Attestation





- Attestation


I have personally seen and examined this patient.: Yes


I have fully participated in the care of the patient.: Yes


I have reviewed all pertinent clinical information, including history, physical 

exam and plan: Yes

## 2019-02-10 LAB
ALBUMIN SERPL-MCNC: 3.4 G/DL (ref 3–4.8)
ALBUMIN/GLOB SERPL: 0.8 {RATIO} (ref 1.1–1.8)
ALT SERPL-CCNC: 16 U/L (ref 7–56)
APTT BLD: 69.1 SECONDS (ref 26.9–38.3)
AST SERPL-CCNC: 38 U/L (ref 17–59)
BASOPHILS # BLD AUTO: 0 K/MM3 (ref 0–2)
BASOPHILS NFR BLD: 0 % (ref 0–3)
BILIRUB DIRECT SERPL-MCNC: 0.3 MG/DL (ref 0–0.4)
BUN SERPL-MCNC: 21 MG/DL (ref 7–21)
CALCIUM SERPL-MCNC: 8.6 MG/DL (ref 8.4–10.5)
EOSINOPHIL # BLD: 0 10*3/UL (ref 0–0.7)
EOSINOPHIL NFR BLD: 0.1 % (ref 1.5–5)
ERYTHROCYTE [DISTWIDTH] IN BLOOD BY AUTOMATED COUNT: 20.7 % (ref 11.5–14.5)
GFR NON-AFRICAN AMERICAN: > 60
HGB BLD-MCNC: 9.5 G/DL (ref 14–18)
INR PPP: 1.5
LYMPHOCYTES # BLD: 1.7 10*3/UL (ref 1.2–3.4)
LYMPHOCYTES NFR BLD AUTO: 18.1 % (ref 22–35)
MCH RBC QN AUTO: 19.6 PG (ref 25–35)
MCHC RBC AUTO-ENTMCNC: 26.2 G/DL (ref 31–37)
MCV RBC AUTO: 74.8 FL (ref 80–105)
MONOCYTES # BLD AUTO: 0.6 10*3/UL (ref 0.1–0.6)
MONOCYTES NFR BLD: 6.5 % (ref 1–6)
PLATELET # BLD: 251 10^3/UL (ref 120–450)
PMV BLD AUTO: 9.3 FL (ref 7–11)
PROTHROMBIN TIME: 16.9 SECONDS (ref 9.4–12.5)
RBC # BLD AUTO: 4.85 10^6/UL (ref 3.5–6.1)
WBC # BLD AUTO: 9.5 10^3/UL (ref 4.5–11)

## 2019-02-10 PROCEDURE — 5A09457 ASSISTANCE WITH RESPIRATORY VENTILATION, 24-96 CONSECUTIVE HOURS, CONTINUOUS POSITIVE AIRWAY PRESSURE: ICD-10-PCS | Performed by: INTERNAL MEDICINE

## 2019-02-10 RX ADMIN — PANTOPRAZOLE SODIUM SCH MG: 40 TABLET, DELAYED RELEASE ORAL at 06:27

## 2019-02-10 RX ADMIN — IPRATROPIUM BROMIDE AND ALBUTEROL SULFATE SCH ML: .5; 3 SOLUTION RESPIRATORY (INHALATION) at 20:20

## 2019-02-10 RX ADMIN — CLOTRIMAZOLE AND BETAMETHASONE DIPROPIONATE SCH MG: 10; .5 CREAM TOPICAL at 10:56

## 2019-02-10 RX ADMIN — SILVER SULFADIAZINE SCH GM: 10 CREAM TOPICAL at 15:26

## 2019-02-10 RX ADMIN — IPRATROPIUM BROMIDE AND ALBUTEROL SULFATE SCH ML: .5; 3 SOLUTION RESPIRATORY (INHALATION) at 07:36

## 2019-02-10 RX ADMIN — CLOTRIMAZOLE AND BETAMETHASONE DIPROPIONATE SCH MG: 10; .5 CREAM TOPICAL at 18:39

## 2019-02-10 RX ADMIN — SILVER SULFADIAZINE SCH GM: 10 CREAM TOPICAL at 18:39

## 2019-02-10 RX ADMIN — IPRATROPIUM BROMIDE AND ALBUTEROL SULFATE SCH ML: .5; 3 SOLUTION RESPIRATORY (INHALATION) at 02:00

## 2019-02-10 RX ADMIN — MUPIROCIN SCH APPLIC: 20 OINTMENT TOPICAL at 10:54

## 2019-02-10 RX ADMIN — METHYLPREDNISOLONE SODIUM SUCCINATE SCH MG: 40 INJECTION, POWDER, FOR SOLUTION INTRAMUSCULAR; INTRAVENOUS at 22:17

## 2019-02-10 RX ADMIN — METHYLPREDNISOLONE SODIUM SUCCINATE SCH MG: 40 INJECTION, POWDER, FOR SOLUTION INTRAMUSCULAR; INTRAVENOUS at 15:25

## 2019-02-10 RX ADMIN — HEPARIN SODIUM PRN MLS/HR: 10000 INJECTION, SOLUTION INTRAVENOUS at 10:44

## 2019-02-10 RX ADMIN — IPRATROPIUM BROMIDE AND ALBUTEROL SULFATE SCH ML: .5; 3 SOLUTION RESPIRATORY (INHALATION) at 13:14

## 2019-02-10 RX ADMIN — METHYLPREDNISOLONE SODIUM SUCCINATE SCH MG: 40 INJECTION, POWDER, FOR SOLUTION INTRAMUSCULAR; INTRAVENOUS at 06:26

## 2019-02-10 NOTE — PN
DATE:  02/10/2019



LOCATION:  The patient is in the Critical Care Unit, [facility] in [place].



SUBJECTIVE:  The patient is a 58-year-old male.  He is admitted to hospital

for chronic bronchitis with superimposed acute bronchitis, lymphedema,

atrial fibrillation, sleep apnea and hypertension.  The patient is seen

this morning.  He seems a little bit uncomfortable, lying down in bed.



PHYSICAL EXAMINATION:

VITAL SIGNS:  His pulse is 84 and blood pressure 122/82.  The patient has

atrial fibrillation by history and the patient also has long history of

morbid obesity.  He weighs about 500 pounds.  His height is 6 feet 5

inches.

LUNGS:  On clinical examination, the patient's breath sounds are diminished

bilaterally.  He has some occasional rhonchi.

HEART:  Atrial fibrillation with moderate ventricular response.

ABDOMEN:  Soft.  Liver and spleen not palpable.

CENTRAL NERVOUS SYSTEM:  The patient has no focal deficits.

EXTREMITIES:  The patient has evidence severe lymphedema of both legs and

some superficial cellulitis.



MEDICATIONS:  The patient's medications consists of Bactroban for the skin.

The patient getting Coumadin 10 mg p.o. daily for the prevention of

events secondary to atrial fibrillation.  The patient is on doxycycline 100

mg b.i.d. for chronic lung infection.  The patient has albuterol.  The

patient is on heparin for prophylaxis at this time.  The patient will be

covered with heparin until the three days and then heparin will be

discontinued.  The patient will be continued on Coumadin.  The patient is also

getting treatment for chronic iron deficiency anemia.  His prognosis is

guarded.  Condition seems to be clinically improving and stable.



LABORATORY DATA:  The patient's lab work done, hemoglobin 9.5, it has

improved from 8.7 and 8.3.  The patient's chemistry, the renal functions

are within normal limits.  The patient's alkaline phosphatase is low and

the patient diet is heart healthy diet.  The serum iron level is 19, which

is low.  The patient is given supplementary iron.



ASSESSMENT AND PLAN:  We will followup.







__________________________________________

Bob Guillaume MD





DD:  02/10/2019 8:33:02

DT:  02/10/2019 12:12:34

Job # [id number]

LUIS

## 2019-02-10 NOTE — PN
DATE:  02/10/2019



CARDIOLOGY FOLLOWUP



SUBJECTIVE:  The patient Is experiencing dry cough.  He denies any chest

pain and atrial fibrillation was controlled rate.



PHYSICAL EXAMINATION:

VITAL SIGNS:  Blood pressure 164/81, heart rate 87, temperature 97.2,

respirations 20.

HEENT:  Pale conjunctivae.

CHEST:  Has some breath sounds over the basis.

HEART:  S1 and S2 regular.

ABDOMEN:  Soft.

EXTREMITIES:  Significant inner thigh edema.



LABORATORY DATA:  Today's hemoglobin and hematocrit 9.5 and 26.3, white

count and platelet count are within normal limits.



Today's SMA-7; sodium 139, potassium 4, chloride 89, CO2 of 43, glucose

111, BUN 21 and creatinine 0.8.



ASSESSMENT:

1.  Atrial fibrillation.

2.  Moderate-to-severe aortic insufficiency.

3.  Morbid obesity and sleep apnea.

4.  Anemia.

5.  Mild pulmonary hypertension.



RECOMMENDATIONS:  Continue current doxycycline 100 mg twice a day,

intravenous heparin infusion _____ for atrial fibrillation.  Continue Lasix

40 mg intravenously every 8 hours, Lipitor 40 mg once a day, Lopressor 25

mg once a day, _____ every 8 hours.  The patient will receive Coumadin 10

mg today.  The patient is treated on iron sucrose infusion.  The patient's

most recent INR of today is 1.5.







__________________________________________

Vladislav Delgado MD





DD:  02/10/2019 12:04:51

DT:  02/10/2019 13:04:40

Job # [id number]

## 2019-02-10 NOTE — CP.PCM.PN
<Aaliyah Ceballosalberto - Last Filed: 02/10/19 12:47>





Subjective





- Date & Time of Evaluation


Date of Evaluation: 02/10/19


Time of Evaluation: 12:47





- Subjective


Subjective: 


Podiatry consult note for Dr. Huffman





57 y/o male seen and evaluated in the ICU for bilateral leg wounds. Patient 

alert, awake, states he is feeling much better. Patient also states the swelling

in his legs has decreased. Patient is resting comfortably in bed, and denies any

acute overnight events. 








Objective





- Vital Signs/Intake and Output


Vital Signs (last 24 hours): 


                                        











Temp Pulse Resp BP Pulse Ox


 


 97.2 F L  87   20   134/81   96 


 


 02/10/19 04:00  02/10/19 10:53  02/10/19 04:00  02/10/19 10:53  02/09/19 06:00








Intake and Output: 


                                        











 02/10/19 02/10/19





 06:59 18:59


 


Intake Total 194 250


 


Output Total 4900 


 


Balance -4706 250














- Medications


Medications: 


                               Current Medications





Acetaminophen (Tylenol 325mg Tab)  650 mg PO Q6 PRN


   PRN Reason: TEMP>=99.5F


   Last Admin: 02/06/19 02:33 Dose:  650 mg


Acetaminophen (Tylenol 650 Mg Supp)  650 mg RC Q6H PRN


   PRN Reason: TEMP>=99.5F


Albuterol/Ipratropium (Duoneb 3 Mg/0.5 Mg (3 Ml) Ud)  3 ml IH [id number] [facility]


   Last Admin: 02/10/19 07:36 Dose:  3 ml


Atorvastatin Calcium (Lipitor)  40 mg PO DIN [facility]


   Last Admin: 02/09/19 18:13 Dose:  40 mg


Betamethasone/Clotrimazole (Lotrisone)  0 gm TOP BID [facility]


   Last Admin: 02/10/19 10:56 Dose:  1 mg


Docusate Sodium (Colace)  100 mg PO TID [facility]


   Last Admin: 02/10/19 10:54 Dose:  Not Given


Doxycycline Hyclate (Doryx)  100 mg PO Q12 [facility]; Protocol


   Stop: 02/14/19 22:01


   Last Admin: 02/10/19 10:53 Dose:  100 mg


Ergocalciferol (Drisdol 50,000 Intl Units Cap)  1 cap PO Q7D [facility]


   Last Admin: 02/07/19 09:51 Dose:  1 cap


Furosemide (Lasix)  40 mg IVP Q8H [facility]


   Last Admin: 02/10/19 06:27 Dose:  40 mg


Heparin Sodium/Sodium Chloride (Heparin 82302 Units/250ml 1/2 Normal Saline)  

25,000 units in 250 mls @ 20.02 mls/hr IV .[id number] PRN; Protocol


   PRN Reason: ADJUST RATE PER PROTOCOL


   Last Admin: 02/10/19 10:44 Dose:  7.25 units/kg/hr, 17.594 mls/hr


Iron Sucrose 200 mg/ Sodium (Chloride)  110 mls @ 110 mls/hr IVPB DAILY [facility]


   Stop: 02/11/19 10:59


   Last Admin: 02/10/19 10:43 Dose:  110 mls/hr


Methylprednisolone (Solu-Medrol)  20 mg IVP Q8H [facility]


   Last Admin: 02/10/19 06:26 Dose:  20 mg


Metoprolol Tartrate (Lopressor)  25 mg PO BID [facility]


   Last Admin: 02/10/19 10:53 Dose:  25 mg


Mupirocin (Bactroban Ointment)  0 gm TOP DAILY [facility]


   Last Admin: 02/10/19 10:54 Dose:  1 applic


Ondansetron HCl (Zofran Inj)  4 mg IVP Q4H PRN


   PRN Reason: Nausea/Vomiting


Pantoprazole Sodium (Protonix Ec Tab)  40 mg PO 0600 [facility]


   Last Admin: 02/10/19 06:27 Dose:  40 mg


Silver Sulfadiazine (Silvadene 1%)  0 ea TOP BID [facility]


Warfarin Sodium (Coumadin)  10 mg PO 1800 [facility]; Protocol


   Last Admin: 02/09/19 21:03 Dose:  10 mg











- Labs


Labs: 


                                        





                                 02/10/19 05:00 





                                 02/10/19 05:00 





                                        











PT  16.9 SECONDS (9.4-12.5)  H  02/10/19  05:00    


 


INR  1.50   02/10/19  05:00    


 


APTT  69.1 Seconds (26.9-38.3)  H  02/10/19  05:00    














- Constitutional


Appears: Well, Non-toxic, No Acute Distress





- Head Exam


Head Exam: ATRAUMATIC, NORMOCEPHALIC





- Extremities Exam


Additional comments: 


Bilateral Lower Extremity Exam





VASC: DP and PT non-palpable secondary to significant lymphedema, TG within 

normal limits





DERM





RIGHT: superficial wounds noted to the lateral aspect of the right leg and to 

the posterior thigh, 100% granular base, no probe to bone, no malodor, minimal 

drainage, no signs of infection noted





LEFT: small superficial wound noted to the medial aspect of the left leg,  100% 

granular base, no probe to bone, no malodor, no drainage, no signs of infection 

noted, significant lymphedema noted bilaterally, however it is improving





NEURO: grossly intact now





ORTHO: pain with movement and range of motion, minimal pain on palpation to 

wounds








- Neurological Exam


Neurological Exam: Alert, Awake, Oriented x3





- Psychiatric Exam


Psychiatric exam: Normal Affect, Normal Mood





Assessment and Plan





- Assessment and Plan (Free Text)


Assessment: 


57 y/o male patient seen with superficial wounds to bilateral lower extremity, 

non-infected





Plan: 


Patient seen and evaluated with Dr. Huffman


Plan discussed with attending


Chart, labs and vitals were reviewed, afebrile, absent leukocytosis


Wound cleansed with saline and dressed with bactroban, maxorb, optifoam, and ACE


Lotrisone cream applied to bilateral feet


Patient stable from podiatry standpoint, no intervention required


Podiatry will continue to follow











<Grzegorz Huffman - Last Filed: 02/11/19 09:26>





Objective





- Vital Signs/Intake and Output


Vital Signs (last 24 hours): 


                                        











Temp Pulse Resp BP Pulse Ox


 


 98.5 F   98 H  22   156/75 H  96 


 


 02/11/19 06:00  02/11/19 06:00  02/11/19 06:00  02/11/19 06:00  02/11/19 06:00








Intake and Output: 


                                        











 02/11/19 02/11/19





 06:59 18:59


 


Intake Total 1550 


 


Output Total 8200 


 


Balance -6650 














- Medications


Medications: 


                               Current Medications





Acetaminophen (Tylenol 325mg Tab)  650 mg PO Q6 PRN


   PRN Reason: TEMP>=99.5F


   Last Admin: 02/06/19 02:33 Dose:  650 mg


Acetaminophen (Tylenol 650 Mg Supp)  650 mg RC Q6H PRN


   PRN Reason: TEMP>=99.5F


Albuterol/Ipratropium (Duoneb 3 Mg/0.5 Mg (3 Ml) Ud)  3 ml IH [id number] [facility]


   Last Admin: 02/11/19 07:48 Dose:  3 ml


Atorvastatin Calcium (Lipitor)  40 mg PO DIN [facility]


   Last Admin: 02/10/19 18:38 Dose:  40 mg


Betamethasone/Clotrimazole (Lotrisone)  0 gm TOP BID [facility]


   Last Admin: 02/10/19 18:39 Dose:  1 mg


Docusate Sodium (Colace)  100 mg PO TID [facility]


   Last Admin: 02/10/19 18:34 Dose:  Not Given


Doxycycline Hyclate (Doryx)  100 mg PO Q12 [facility]; Protocol


   Stop: 02/14/19 22:01


   Last Admin: 02/10/19 22:17 Dose:  100 mg


Ergocalciferol (Drisdol 50,000 Intl Units Cap)  1 cap PO Q7D [facility]


   Last Admin: 02/07/19 09:51 Dose:  1 cap


Furosemide (Lasix)  40 mg IVP Q8H [facility]


   Last Admin: 02/11/19 05:29 Dose:  40 mg


Heparin Sodium/Sodium Chloride (Heparin 95311 Units/250ml 1/2 Normal Saline)  

25,000 units in 250 mls @ 20.02 mls/hr IV .[id number] PRN; Protocol


   PRN Reason: ADJUST RATE PER PROTOCOL


   Last Admin: 02/11/19 00:24 Dose:  7.25 units/kg/hr, 17.594 mls/hr


Iron Sucrose 200 mg/ Sodium (Chloride)  110 mls @ 110 mls/hr IVPB DAILY [facility]


   Stop: 02/11/19 10:59


   Last Admin: 02/10/19 10:43 Dose:  110 mls/hr


Methylprednisolone (Solu-Medrol)  20 mg IVP Q8H [facility]


   Last Admin: 02/11/19 05:09 Dose:  20 mg


Metoprolol Tartrate (Lopressor)  25 mg PO BID [facility]


   Last Admin: 02/10/19 18:37 Dose:  25 mg


Mupirocin (Bactroban Ointment)  0 gm TOP DAILY [facility]


   Last Admin: 02/10/19 10:54 Dose:  1 applic


Ondansetron HCl (Zofran Inj)  4 mg IVP Q4H PRN


   PRN Reason: Nausea/Vomiting


Pantoprazole Sodium (Protonix Ec Tab)  40 mg PO 0600 [facility]


   Last Admin: 02/10/19 06:27 Dose:  40 mg


Silver Sulfadiazine (Silvadene 1%)  0 ea TOP BID [facility]


   Last Admin: 02/10/19 18:39 Dose:  1 gm


Warfarin Sodium (Coumadin)  10 mg PO 1800 [facility]; Protocol


   Last Admin: 02/10/19 18:37 Dose:  10 mg











- Labs


Labs: 


                                        





                                 02/11/19 06:10 





                                 02/11/19 06:10 





                                        











PT  17.5 SECONDS (9.4-12.5)  H  02/11/19  06:10    


 


INR  1.55   02/11/19  06:10    


 


APTT  57.1 Seconds (26.9-38.3)  H  02/11/19  06:10    














Attending/Attestation





- Attestation


I have personally seen and examined this patient.: Yes


I have fully participated in the care of the patient.: Yes


I have reviewed all pertinent clinical information, including history, physical 

exam and plan: Yes

## 2019-02-10 NOTE — CP.PCM.PN
Subjective





- Date & Time of Evaluation


Date of Evaluation: 02/10/19


Time of Evaluation: 08:30





- Subjective


Subjective: 





Comfortable in bed, breathing better, no fevers, less pain and swelling of his 

legs.





Objective





- Vital Signs/Intake and Output


Vital Signs (last 24 hours): 


                                        











Temp Pulse Resp BP Pulse Ox


 


 97.2 F L  84   20   122/82   96 


 


 02/10/19 04:00  02/10/19 04:00  02/10/19 04:00  02/10/19 06:27  02/09/19 06:00











- Medications


Medications: 


                               Current Medications





Acetaminophen (Tylenol 325mg Tab)  650 mg PO Q6 PRN


   PRN Reason: TEMP>=99.5F


   Last Admin: 02/06/19 02:33 Dose:  650 mg


Acetaminophen (Tylenol 650 Mg Supp)  650 mg RC Q6H PRN


   PRN Reason: TEMP>=99.5F


Albuterol/Ipratropium (Duoneb 3 Mg/0.5 Mg (3 Ml) Ud)  3 ml IH [id number] [facility]


   Last Admin: 02/09/19 19:41 Dose:  3 ml


Atorvastatin Calcium (Lipitor)  40 mg PO DIN [facility]


   Last Admin: 02/09/19 18:13 Dose:  40 mg


Betamethasone/Clotrimazole (Lotrisone)  0 gm TOP BID [facility]


   Last Admin: 02/09/19 19:54 Dose:  1 mg


Docusate Sodium (Colace)  100 mg PO TID [facility]


   Last Admin: 02/09/19 18:11 Dose:  Not Given


Doxycycline Hyclate (Doryx)  100 mg PO Q12 [facility]; Protocol


   Stop: 02/14/19 22:01


   Last Admin: 02/09/19 21:02 Dose:  100 mg


Ergocalciferol (Drisdol 50,000 Intl Units Cap)  1 cap PO Q7D [facility]


   Last Admin: 02/07/19 09:51 Dose:  1 cap


Furosemide (Lasix)  40 mg IVP Q8H [facility]


   Last Admin: 02/10/19 06:27 Dose:  40 mg


Heparin Sodium/Sodium Chloride (Heparin 62099 Units/250ml 1/2 Normal Saline)  

25,000 units in 250 mls @ 20.02 mls/hr IV .[id number] PRN; Protocol


   PRN Reason: ADJUST RATE PER PROTOCOL


   Last Admin: 02/09/19 18:01 Dose:  7.25 units/kg/hr, 17.594 mls/hr


Iron Sucrose 200 mg/ Sodium (Chloride)  110 mls @ 110 mls/hr IVPB DAILY [facility]


   Stop: 02/11/19 10:59


   Last Admin: 02/09/19 11:01 Dose:  110 mls/hr


Methylprednisolone (Solu-Medrol)  20 mg IVP Q8H [facility]


   Last Admin: 02/10/19 06:26 Dose:  20 mg


Metoprolol Tartrate (Lopressor)  25 mg PO BID [facility]


   Last Admin: 02/09/19 18:16 Dose:  25 mg


Mupirocin (Bactroban Ointment)  0 gm TOP DAILY [facility]


   Last Admin: 02/09/19 10:53 Dose:  1 applic


Ondansetron HCl (Zofran Inj)  4 mg IVP Q4H PRN


   PRN Reason: Nausea/Vomiting


Pantoprazole Sodium (Protonix Ec Tab)  40 mg PO 0600 [facility]


   Last Admin: 02/10/19 06:27 Dose:  40 mg


Silver Sulfadiazine (Silvadene 1% 25 Gm)  0 gm TP BID [facility]


   Last Admin: 02/09/19 18:18 Dose:  1 gm


Warfarin Sodium (Coumadin)  10 mg PO 1800 [facility]; Protocol


   Last Admin: 02/09/19 21:03 Dose:  10 mg











- Labs


Labs: 


                                        





                                 02/10/19 05:00 





                                 02/10/19 05:00 





                                        











PT  16.9 SECONDS (9.4-12.5)  H  02/10/19  05:00    


 


INR  1.50   02/10/19  05:00    


 


APTT  69.1 Seconds (26.9-38.3)  H  02/10/19  05:00    














- Constitutional


Appears: No Acute Distress, Chronically Ill





- Head Exam


Head Exam: NORMAL INSPECTION





- Respiratory Exam


Respiratory Exam: Decreased Breath Sounds





- Cardiovascular Exam


Cardiovascular Exam: +S1, +S2





- GI/Abdominal Exam


GI & Abdominal Exam: Soft.  absent: Tenderness





- Extremities Exam


Additional comments: 





both legs with dressings and bandages in place





Assessment and Plan





- Assessment and Plan (Free Text)


Plan: 


Assessment


Probable acute bronchitis


chronic lower extremity lymphedema


chronic atrial fibrillation


HTN


morbid obesity with BMI 73





Plan


complete total 3-5 days of PO Doxycycline


follow up further recommendations of Podiatry


will monitor clinically

## 2019-02-11 LAB
ALBUMIN SERPL-MCNC: 3.3 G/DL (ref 3–4.8)
ALBUMIN/GLOB SERPL: 0.8 {RATIO} (ref 1.1–1.8)
ALT SERPL-CCNC: 21 U/L (ref 7–56)
APTT BLD: 57.1 SECONDS (ref 26.9–38.3)
AST SERPL-CCNC: 42 U/L (ref 17–59)
BASOPHILS # BLD AUTO: 0 K/MM3 (ref 0–2)
BASOPHILS NFR BLD: 0 % (ref 0–3)
BILIRUB DIRECT SERPL-MCNC: 0.2 MG/DL (ref 0–0.4)
BUN SERPL-MCNC: 21 MG/DL (ref 7–21)
CALCIUM SERPL-MCNC: 8.8 MG/DL (ref 8.4–10.5)
EOSINOPHIL # BLD: 0 10*3/UL (ref 0–0.7)
EOSINOPHIL NFR BLD: 0.1 % (ref 1.5–5)
ERYTHROCYTE [DISTWIDTH] IN BLOOD BY AUTOMATED COUNT: 21.4 % (ref 11.5–14.5)
GFR NON-AFRICAN AMERICAN: > 60
HGB BLD-MCNC: 9.6 G/DL (ref 14–18)
INR PPP: 1.55
LYMPHOCYTES # BLD: 1 10*3/UL (ref 1.2–3.4)
LYMPHOCYTES NFR BLD AUTO: 13.4 % (ref 22–35)
MCH RBC QN AUTO: 19.6 PG (ref 25–35)
MCHC RBC AUTO-ENTMCNC: 25.9 G/DL (ref 31–37)
MCV RBC AUTO: 75.7 FL (ref 80–105)
MONOCYTES # BLD AUTO: 0.3 10*3/UL (ref 0.1–0.6)
MONOCYTES NFR BLD: 3.8 % (ref 1–6)
PLATELET # BLD: 268 10^3/UL (ref 120–450)
PMV BLD AUTO: 9.6 FL (ref 7–11)
PROTHROMBIN TIME: 17.5 SECONDS (ref 9.4–12.5)
RBC # BLD AUTO: 4.89 10^6/UL (ref 3.5–6.1)
WBC # BLD AUTO: 7.7 10^3/UL (ref 4.5–11)

## 2019-02-11 RX ADMIN — IPRATROPIUM BROMIDE AND ALBUTEROL SULFATE SCH ML: .5; 3 SOLUTION RESPIRATORY (INHALATION) at 14:28

## 2019-02-11 RX ADMIN — SILVER SULFADIAZINE SCH GM: 10 CREAM TOPICAL at 18:30

## 2019-02-11 RX ADMIN — SILVER SULFADIAZINE SCH GM: 10 CREAM TOPICAL at 10:09

## 2019-02-11 RX ADMIN — IPRATROPIUM BROMIDE AND ALBUTEROL SULFATE SCH ML: .5; 3 SOLUTION RESPIRATORY (INHALATION) at 07:48

## 2019-02-11 RX ADMIN — METHYLPREDNISOLONE SODIUM SUCCINATE SCH MG: 40 INJECTION, POWDER, FOR SOLUTION INTRAMUSCULAR; INTRAVENOUS at 17:26

## 2019-02-11 RX ADMIN — METHYLPREDNISOLONE SODIUM SUCCINATE SCH MG: 40 INJECTION, POWDER, FOR SOLUTION INTRAMUSCULAR; INTRAVENOUS at 10:06

## 2019-02-11 RX ADMIN — METHYLPREDNISOLONE SODIUM SUCCINATE SCH MG: 40 INJECTION, POWDER, FOR SOLUTION INTRAMUSCULAR; INTRAVENOUS at 05:09

## 2019-02-11 RX ADMIN — HEPARIN SODIUM PRN MLS/HR: 10000 INJECTION, SOLUTION INTRAVENOUS at 00:24

## 2019-02-11 RX ADMIN — IPRATROPIUM BROMIDE AND ALBUTEROL SULFATE SCH ML: .5; 3 SOLUTION RESPIRATORY (INHALATION) at 01:09

## 2019-02-11 RX ADMIN — CLOTRIMAZOLE AND BETAMETHASONE DIPROPIONATE SCH MG: 10; .5 CREAM TOPICAL at 18:00

## 2019-02-11 RX ADMIN — CLOTRIMAZOLE AND BETAMETHASONE DIPROPIONATE SCH MG: 10; .5 CREAM TOPICAL at 10:09

## 2019-02-11 RX ADMIN — HEPARIN SODIUM PRN MLS/HR: 10000 INJECTION, SOLUTION INTRAVENOUS at 17:13

## 2019-02-11 RX ADMIN — MUPIROCIN SCH APPLIC: 20 OINTMENT TOPICAL at 10:08

## 2019-02-11 RX ADMIN — IPRATROPIUM BROMIDE AND ALBUTEROL SULFATE SCH ML: .5; 3 SOLUTION RESPIRATORY (INHALATION) at 19:41

## 2019-02-11 NOTE — CP.PCM.PN
Subjective





- Date & Time of Evaluation


Date of Evaluation: 02/11/19


Time of Evaluation: 10:35





- Subjective


Subjective: 





Not short of breath at rest, no fevers, no increased pain in the legs.





Objective





- Vital Signs/Intake and Output


Vital Signs (last 24 hours): 


                                        











Temp Pulse Resp BP Pulse Ox


 


 97.2 F L  87   20   135/70   96 


 


 02/10/19 04:00  02/10/19 10:53  02/10/19 04:00  02/10/19 15:27  02/09/19 06:00








Intake and Output: 


                                        











 02/10/19 02/10/19





 06:59 18:59


 


Intake Total 194 250


 


Output Total 4900 


 


Balance -4706 250














- Medications


Medications: 


                               Current Medications





Acetaminophen (Tylenol 325mg Tab)  650 mg PO Q6 PRN


   PRN Reason: TEMP>=99.5F


   Last Admin: 02/06/19 02:33 Dose:  650 mg


Acetaminophen (Tylenol 650 Mg Supp)  650 mg RC Q6H PRN


   PRN Reason: TEMP>=99.5F


Albuterol/Ipratropium (Duoneb 3 Mg/0.5 Mg (3 Ml) Ud)  3 ml IH [id number] [facility]


   Last Admin: 02/10/19 13:14 Dose:  3 ml


Atorvastatin Calcium (Lipitor)  40 mg PO DIN [facility]


   Last Admin: 02/09/19 18:13 Dose:  40 mg


Betamethasone/Clotrimazole (Lotrisone)  0 gm TOP BID [facility]


   Last Admin: 02/10/19 10:56 Dose:  1 mg


Docusate Sodium (Colace)  100 mg PO TID [facility]


   Last Admin: 02/10/19 15:19 Dose:  Not Given


Doxycycline Hyclate (Doryx)  100 mg PO Q12 [facility]; Protocol


   Stop: 02/14/19 22:01


   Last Admin: 02/10/19 10:53 Dose:  100 mg


Ergocalciferol (Drisdol 50,000 Intl Units Cap)  1 cap PO Q7D [facility]


   Last Admin: 02/07/19 09:51 Dose:  1 cap


Furosemide (Lasix)  40 mg IVP Q8H [facility]


   Last Admin: 02/10/19 15:27 Dose:  40 mg


Heparin Sodium/Sodium Chloride (Heparin 58671 Units/250ml 1/2 Normal Saline)  

25,000 units in 250 mls @ 20.02 mls/hr IV .[id number] PRN; Protocol


   PRN Reason: ADJUST RATE PER PROTOCOL


   Last Admin: 02/10/19 10:44 Dose:  7.25 units/kg/hr, 17.594 mls/hr


Iron Sucrose 200 mg/ Sodium (Chloride)  110 mls @ 110 mls/hr IVPB DAILY [facility]


   Stop: 02/11/19 10:59


   Last Admin: 02/10/19 10:43 Dose:  110 mls/hr


Methylprednisolone (Solu-Medrol)  20 mg IVP Q8H [facility]


   Last Admin: 02/10/19 15:25 Dose:  20 mg


Metoprolol Tartrate (Lopressor)  25 mg PO BID [facility]


   Last Admin: 02/10/19 10:53 Dose:  25 mg


Mupirocin (Bactroban Ointment)  0 gm TOP DAILY [facility]


   Last Admin: 02/10/19 10:54 Dose:  1 applic


Ondansetron HCl (Zofran Inj)  4 mg IVP Q4H PRN


   PRN Reason: Nausea/Vomiting


Pantoprazole Sodium (Protonix Ec Tab)  40 mg PO 0600 [facility]


   Last Admin: 02/10/19 06:27 Dose:  40 mg


Silver Sulfadiazine (Silvadene 1%)  0 ea TOP BID [facility]


   Last Admin: 02/10/19 15:26 Dose:  1 gm


Warfarin Sodium (Coumadin)  10 mg PO 1800 [facility]; Protocol


   Last Admin: 02/09/19 21:03 Dose:  10 mg











- Labs


Labs: 


                                        





                                 02/10/19 05:00 





                                 02/10/19 05:00 





                                        











PT  16.9 SECONDS (9.4-12.5)  H  02/10/19  05:00    


 


INR  1.50   02/10/19  05:00    


 


APTT  69.1 Seconds (26.9-38.3)  H  02/10/19  05:00    














- Constitutional


Appears: No Acute Distress, Chronically Ill





- Head Exam


Head Exam: NORMAL INSPECTION





- Respiratory Exam


Respiratory Exam: Decreased Breath Sounds





- Cardiovascular Exam


Cardiovascular Exam: +S1, +S2





- GI/Abdominal Exam


GI & Abdominal Exam: Soft.  absent: Tenderness





- Extremities Exam


Additional comments: 





both legs with bandages in place





Assessment and Plan





- Assessment and Plan (Free Text)


Plan: 





Assessment


Probable acute bronchitis


chronic lower extremity lymphedema


chronic atrial fibrillation


HTN


morbid obesity with BMI 73





Plan


complete total 3-5 days of PO Doxycycline - can d/c Doxycycline today


follow up further recommendations of Podiatry


will continue to monitor clinically

## 2019-02-11 NOTE — PN
DATE:  02/11/2019



SUBJECTIVE:  The patient is now moved to 261, bed two.  The patient has

been transferred out of the ICU.  The patient is in specialty bed because

of his body mass index and massive obesity.  Overnight nurse's notes were

reviewed.  The patient did not have any nausea, vomiting, diarrhea.  Denies

any fever or chills.  Denies any hemoptysis, hematemesis, melena.



PHYSICAL EXAMINATION:

VITAL SIGNS:  T-max 98.5.  Telemetry atrial fibrillation, heart rate around

80s and 90s beat per minute, blood pressure 156/75, respirations 22, O2 sat

96%.  Intake 1530, output total in the last 24 hours 8200.

HEENT:  Head is normocephalic, atraumatic.  HEENT examination shows pinkish

pale conjunctivae.  Anicteric sclerae.  No oropharyngeal lesion.  No neck

rigidity.

NECK:  Short and supple.

CHEST:  Symmetrical.

LUNGS:  Decreased breath sound at the bases.

CARDIOVASCULAR:  S1, S2, irregular rhythm.

ABDOMEN:  Morbidly obese.  Unable to appreciate any organomegaly.

GENITALIA:  Male.  Positive Weller catheter.

EXTREMITIES:  Shows positive lymphedema of the lower extremity with some

Ace wrap noted.

MUSCULOSKELETAL:  Shows a body mass index of greater than 70.

VASCULAR:  Could not be assessed.  Gait examination is not tested.



DIAGNOSTIC DATA:  02/11/2019, WBC 7.7, hemoglobin/hematocrit 9.6/37,

platelet 268.  Granulocytes 83% segs.  PT 17.5, INR 1.55.  Sodium 138,

potassium 4, chloride 89, CO2 43, BUN 21, creatinine 0.8, GFR greater than

60, glucose 170, calcium 8.8, magnesium 1.8.



IMPRESSION AND PLAN:

1.  New-onset atrial fibrillation.

2.  Congestive heart failure with elevated ProBNP.

3.  Super morbid obesity with body weight of greater than 530 pounds and a

body mass index of greater than 72.

4.  Bilateral massive lymphedema.

5.  Possible obstructive sleep apnea.

6.  Hypertension.

7.  Atrial fibrillation.

8.  Hypertensive cardiovascular disease.

9.  Normocytic iron-deficiency anemia.

10.  Metabolic alkalosis.

11.  Status post severe respiratory acidosis and hypercarbia.

12.  Hypoxemia.

13.  Acute versus acute on chronic hypercapnic respiratory failure with

hypercarbia, hypercapnia, and respiratory acidosis.

14.  Severe gait dysfunction.

15.  Severe deconditioning.

16.  Elevated erythrocyte sedimentation rate.



PLAN:  Plan at this time, the patient has been ordered physical therapy,

occupational therapy, ambulation therapy, out of bed to chair.  The patient

is presently on Coumadin-heparin bridging and heparin will be discontinued

when INR is around 2 to 2.5 or between 2.5 to 3 for anticoagulation, for

atrial fibrillation, and also due to patient's body habitus.  The patient

will be continued on IV diuretics.  The patient will be continued on GI

prophylaxis.  The patient will be continued on all medications as per the

MAR of today which is reviewed.



The patient will continue and start with out of bed to chair, physical

therapy, ambulation therapy, gait training.



Consultation, Infectious Disease, Cardiology, Podiatry, Hematology, and

Gastroenterology.



Plan at this time as above.



The patient updated about his condition, diagnosis, treatment plan,

treatment options and discharge disposition.  Regarding discharge

disposition, I have advised the patient to contact and work in affiliation

with  case management.







__________________________________________

Varinder Mooney MD





DD:  02/11/2019 11:12:16

DT:  02/11/2019 11:17:31

Job # [id number]

## 2019-02-11 NOTE — CP.PCM.APN
Subjective





- Date & Time of Evaluation


Date of Evaluation: 02/11/19


Time of Evaluation: 12:25





- Subjective


Subjective: 





Pt. seen and examined, in bed. States breathing has improved, denied chest pain,

palpitations, states has 6/10 pain to lower back.











Objective





- Vital Signs/Intake and Output


Vital Signs (last 24 hours): 


                                        











Temp Pulse Resp BP Pulse Ox


 


 98.5 F   82   20   148/63   96 


 


 02/11/19 12:00  02/11/19 14:00  02/11/19 12:00  02/11/19 12:00  02/11/19 06:00








Intake and Output: 


                                        











 02/11/19 02/11/19





 06:59 18:59


 


Intake Total 1550 


 


Output Total 8200 


 


Balance -6650 














- Medications


Medications: 


                               Current Medications





Acetaminophen (Tylenol 325mg Tab)  650 mg PO Q6 PRN


   PRN Reason: TEMP>=99.5F


   Last Admin: 02/06/19 02:33 Dose:  650 mg


Acetaminophen (Tylenol 650 Mg Supp)  650 mg RC Q6H PRN


   PRN Reason: TEMP>=99.5F


Albuterol/Ipratropium (Duoneb 3 Mg/0.5 Mg (3 Ml) Ud)  3 ml IH [id number] [facility]


   Last Admin: 02/11/19 14:28 Dose:  3 ml


Atorvastatin Calcium (Lipitor)  40 mg PO DIN [facility]


   Last Admin: 02/10/19 18:38 Dose:  40 mg


Betamethasone/Clotrimazole (Lotrisone)  0 gm TOP BID [facility]


   Last Admin: 02/11/19 10:09 Dose:  1 mg


Docusate Sodium (Colace)  100 mg PO TID [facility]


   Last Admin: 02/10/19 18:34 Dose:  Not Given


Ergocalciferol (Drisdol 50,000 Intl Units Cap)  1 cap PO Q7D [facility]


   Last Admin: 02/07/19 09:51 Dose:  1 cap


Furosemide (Lasix)  40 mg IVP Q8H [facility]


   Last Admin: 02/11/19 05:29 Dose:  40 mg


Heparin Sodium/Sodium Chloride (Heparin 52170 Units/250ml 1/2 Normal Saline)  

25,000 units in 250 mls @ 20.02 mls/hr IV .[id number] PRN; Protocol


   PRN Reason: ADJUST RATE PER PROTOCOL


   Last Admin: 02/11/19 00:24 Dose:  7.25 units/kg/hr, 17.594 mls/hr


Methylprednisolone (Solu-Medrol)  20 mg IVP BID [facility]


   Last Admin: 02/11/19 10:06 Dose:  20 mg


Metoprolol Tartrate (Lopressor)  25 mg PO BID [facility]


   Last Admin: 02/11/19 10:05 Dose:  25 mg


Mupirocin (Bactroban Ointment)  0 gm TOP DAILY [facility]


   Last Admin: 02/11/19 10:08 Dose:  1 applic


Ondansetron HCl (Zofran Inj)  4 mg IVP Q4H PRN


   PRN Reason: Nausea/Vomiting


Pantoprazole Sodium (Protonix Ec Tab)  40 mg PO 0600 [facility]


   Last Admin: 02/10/19 06:27 Dose:  40 mg


Silver Sulfadiazine (Silvadene 1%)  0 ea TOP BID [facility]


   Last Admin: 02/11/19 10:09 Dose:  1 gm


Warfarin Sodium (Coumadin)  10 mg PO 1800 [facility]; Protocol


   Last Admin: 02/10/19 18:37 Dose:  10 mg











- Labs


Labs: 


                                        





                                 02/11/19 06:10 





                                 02/11/19 06:10 





                                        











PT  17.5 SECONDS (9.4-12.5)  H  02/11/19  06:10    


 


INR  1.55   02/11/19  06:10    


 


APTT  57.1 Seconds (26.9-38.3)  H  02/11/19  06:10    














- Constitutional


Appears: Well, Non-toxic, Unkempt





- Head Exam


Head Exam: NORMOCEPHALIC





- Eye Exam


Eye Exam: Normal appearance





- Neck Exam


Neck Exam: Full ROM





- Respiratory Exam


Respiratory Exam: NORMAL BREATHING PATTERN





- Cardiovascular Exam


Cardiovascular Exam: Irregular Rhythm, +S1, +S2





- GI/Abdominal Exam


GI & Abdominal Exam: Soft, Normal Bowel Sounds





- Rectal Exam


Rectal Exam: Deferred





-  Exam


 Exam: absent: Circumcision, NORMAL INSPECTION, Scrotal Swelling, Testicular 

Tenderness, Uretheral Discharge, Testicular Vertical Lie, Bladder Distension


External exam: absent: Ecchymosis, Erythema, Lacerations, Lesions, NORMAL EXT

ERNAL EXAM, Swelling


Speculum exam: absent: Cervical Discharge, Erythema, Foreign Body, Laceration, 

NORMAL SPECULUM EXAM, Tissue, Vaginal Bleeding, Vaginal Discharge


Bimanual exam: absent: Adenexal Mass, Adnexal, Cervical Motion Tendernes, NORMAL

BIMANUAL EXAM, Uterine Enlargement, Uterine Tenderness





- Extremities Exam


Additional comments: 





edema to b/l leg noted , chronic lymphedema appearance





- Neurological Exam


Neurological Exam: Alert, Awake, Oriented x3





- Skin


Skin Exam: Dry, Intact





Assessment and Plan





- Assessment and Plan (Free Text)


Assessment: 





                                 ITS Impressions





Chest X-Ray  02/05/19 02:29


IMPRESSION:


No active disease. 


 


 








Extremity Ultrasound  02/05/19 02:29


IMPRESSION:


No sonographic evidence for deep venous thrombosis in the visualized 


segments of both lower extremities. 


 


Extremely limited study 


 


 








Lung Scan Nuclear Medicine  02/05/19 15:00


IMPRESSION:


Lowprobability ventilation perfusion scan for pulmonary embolism. 


 


 





Assessment:


Patient is a 58 year old male with past medical history of morbid obesity, leg c

ellulitis, chronic lymphedema who presented with shortness of breath and was 

admitted to ICU for hypercapnic respiratory failure, admitted with CHF, and 

atrial fibrillation, undergoing management by cardiolgoy, and I.D.  Surgery was 

consulted for evaluation of recurrent thigh wound. 











Plan:





1.  Pain is improved.  





   


2.  CHF improved.





3.  ATrial Fibrillation


   Rate controlled, heparin drip per cardiology,





4.  Cellulitis Lower extremities


   Antibx per I.D.





5.  Morbid Obesity


   weakness secondary to obesity, PT recs [facility]


   Sw/CM for DC planning to [facility].





Will continue to monitor clinical status and follow closely.

## 2019-02-11 NOTE — CP.PCM.PN
<Aaliyah Ceballosalberto - Last Filed: 02/11/19 16:45>





Subjective





- Date & Time of Evaluation


Date of Evaluation: 02/11/19


Time of Evaluation: 16:48





- Subjective


Subjective: 


Podiatry consult note for Dr. Huffman





57 y/o male seen and evaluated in the ICU for bilateral leg wounds. Patient 

alert, awake, states he is feeling much better. Patient also states the swelling

in his legs has decreased. Patient is resting comfortably in bed, and denies any

acute overnight events. 











Objective





- Vital Signs/Intake and Output


Vital Signs (last 24 hours): 


                                        











Temp Pulse Resp BP Pulse Ox


 


 98.5 F   82   20   148/63   96 


 


 02/11/19 12:00  02/11/19 14:00  02/11/19 12:00  02/11/19 12:00  02/11/19 06:00








Intake and Output: 


                                        











 02/11/19 02/11/19





 06:59 18:59


 


Intake Total 1550 


 


Output Total 8200 


 


Balance -6650 














- Medications


Medications: 


                               Current Medications





Acetaminophen (Tylenol 325mg Tab)  650 mg PO Q6 PRN


   PRN Reason: TEMP>=99.5F


   Last Admin: 02/06/19 02:33 Dose:  650 mg


Acetaminophen (Tylenol 650 Mg Supp)  650 mg RC Q6H PRN


   PRN Reason: TEMP>=99.5F


Albuterol/Ipratropium (Duoneb 3 Mg/0.5 Mg (3 Ml) Ud)  3 ml IH [id number] [facility]


   Last Admin: 02/11/19 14:28 Dose:  3 ml


Atorvastatin Calcium (Lipitor)  40 mg PO DIN [facility]


   Last Admin: 02/10/19 18:38 Dose:  40 mg


Betamethasone/Clotrimazole (Lotrisone)  0 gm TOP BID [facility]


   Last Admin: 02/11/19 10:09 Dose:  1 mg


Docusate Sodium (Colace)  100 mg PO TID [facility]


   Last Admin: 02/10/19 18:34 Dose:  Not Given


Ergocalciferol (Drisdol 50,000 Intl Units Cap)  1 cap PO Q7D [facility]


   Last Admin: 02/07/19 09:51 Dose:  1 cap


Furosemide (Lasix)  40 mg IVP Q8H [facility]


   Last Admin: 02/11/19 05:29 Dose:  40 mg


Heparin Sodium/Sodium Chloride (Heparin 94218 Units/250ml 1/2 Normal Saline)  

25,000 units in 250 mls @ 20.02 mls/hr IV .[id number] PRN; Protocol


   PRN Reason: ADJUST RATE PER PROTOCOL


   Last Admin: 02/11/19 00:24 Dose:  7.25 units/kg/hr, 17.594 mls/hr


Methylprednisolone (Solu-Medrol)  20 mg IVP BID [facility]


   Last Admin: 02/11/19 10:06 Dose:  20 mg


Metoprolol Tartrate (Lopressor)  25 mg PO BID [facility]


   Last Admin: 02/11/19 10:05 Dose:  25 mg


Mupirocin (Bactroban Ointment)  0 gm TOP DAILY [facility]


   Last Admin: 02/11/19 10:08 Dose:  1 applic


Ondansetron HCl (Zofran Inj)  4 mg IVP Q4H [facility]N


   PRN Reason: Nausea/Vomiting


Pantoprazole Sodium (Protonix Ec Tab)  40 mg PO 0600 [facility]


   Last Admin: 02/10/19 06:27 Dose:  40 mg


Silver Sulfadiazine (Silvadene 1%)  0 ea TOP BID [facility]


   Last Admin: 02/11/19 10:09 Dose:  1 gm


Warfarin Sodium (Coumadin)  10 mg PO 1800 [facility]; Protocol


   Last Admin: 02/10/19 18:37 Dose:  10 mg











- Labs


Labs: 


                                        





                                 02/11/19 06:10 





                                 02/11/19 06:10 





                                        











PT  17.5 SECONDS (9.4-12.5)  H  02/11/19  06:10    


 


INR  1.55   02/11/19  06:10    


 


APTT  57.1 Seconds (26.9-38.3)  H  02/11/19  06:10    














- Constitutional


Appears: Well, Non-toxic, No Acute Distress





- Head Exam


Head Exam: ATRAUMATIC, NORMOCEPHALIC





- Extremities Exam


Additional comments: 


Bilateral Lower Extremity Exam





VASC: DP and PT non-palpable secondary to significant lymphedema, TG within 

normal limits





DERM





RIGHT: superficial wounds noted to the lateral aspect of the right leg and to 

the posterior thigh, 100% granular base, no probe to bone, no malodor, minimal 

drainage, no signs of infection noted





LEFT: small superficial wound noted to the medial aspect of the left leg,  100% 

granular base, no probe to bone, no malodor, no drainage, no signs of infection 

noted, significant lymphedema noted bilaterally, however it is improving





NEURO: grossly intact now





ORTHO: pain with movement and range of motion, minimal pain on palpation to 

wounds








- Neurological Exam


Neurological Exam: Alert, Awake, Oriented x3





- Psychiatric Exam


Psychiatric exam: Normal Affect, Normal Mood





Assessment and Plan





- Assessment and Plan (Free Text)


Assessment: 


57 y/o male patient seen with superficial wounds to bilateral lower extremity, 

non-infected





Plan: 


Patient seen and evaluated with Dr. Huffman


Plan discussed with attending


Chart, labs and vitals were reviewed, afebrile, absent leukocytosis


Wound cleansed with saline and dressed with bactroban, maxorb, optifoam, and ACE


Lotrisone cream applied to bilateral feet


Patient stable from podiatry standpoint, no intervention required


Podiatry will continue to follow














<Grzegorz Huffman - Last Filed: 02/12/19 10:00>





Objective





- Vital Signs/Intake and Output


Vital Signs (last 24 hours): 


                                        











Temp Pulse Resp BP Pulse Ox


 


 98.4 F   95 H  18   128/56 L  90 L


 


 02/12/19 06:00  02/12/19 09:28  02/12/19 06:00  02/12/19 09:28  02/12/19 06:00








Intake and Output: 


                                        











 02/12/19 02/12/19





 06:59 18:59


 


Intake Total 1891 250


 


Output Total 6700 


 


Balance -4809 250














- Medications


Medications: 


                               Current Medications





Acetaminophen (Tylenol 325mg Tab)  650 mg PO Q6 PRN


   PRN Reason: TEMP>=99.5F


   Last Admin: 02/06/19 02:33 Dose:  650 mg


Acetaminophen (Tylenol 650 Mg Supp)  650 mg RC Q6H PRN


   PRN Reason: TEMP>=99.5F


Acetazolamide (Diamox 250 Mg Tab)  250 mg PO BID [facility]


   Stop: 02/17/19 10:01


   Last Admin: 02/12/19 09:44 Dose:  250 mg


Albuterol/Ipratropium (Duoneb 3 Mg/0.5 Mg (3 Ml) Ud)  3 ml IH [id number] [facility]


   Last Admin: 02/12/19 07:53 Dose:  3 ml


Atorvastatin Calcium (Lipitor)  40 mg PO DIN [facility]


   Last Admin: 02/11/19 17:26 Dose:  40 mg


Betamethasone/Clotrimazole (Lotrisone)  0 gm TOP BID [facility]


   Last Admin: 02/12/19 09:32 Dose:  1 applic


Docusate Sodium (Colace)  100 mg PO TID [facility]


   Last Admin: 02/10/19 18:34 Dose:  Not Given


Ergocalciferol (Drisdol 50,000 Intl Units Cap)  1 cap PO Q7D [facility]


   Last Admin: 02/07/19 09:51 Dose:  1 cap


Furosemide (Lasix)  40 mg IVP Q8H [facility]


   Last Admin: 02/12/19 05:32 Dose:  40 mg


Heparin Sodium/Sodium Chloride (Heparin 69007 Units/250ml 1/2 Normal Saline)  

25,000 units in 250 mls @ 20.02 mls/hr IV .[id number] PRN; Protocol


   PRN Reason: ADJUST RATE PER PROTOCOL


   Last Admin: 02/12/19 09:27 Dose:  7.25 units/kg/hr, 17.594 mls/hr


Metoprolol Tartrate (Lopressor)  25 mg PO BID [facility]


   Last Admin: 02/12/19 09:28 Dose:  25 mg


Mupirocin (Bactroban Ointment)  0 gm TOP DAILY [facility]


   Last Admin: 02/12/19 09:32 Dose:  1 applic


Ondansetron HCl (Zofran Inj)  4 mg IVP Q4H [facility]N


   PRN Reason: Nausea/Vomiting


Pantoprazole Sodium (Protonix Ec Tab)  40 mg PO 0600 [facility]


   Last Admin: 02/12/19 05:25 Dose:  40 mg


Prednisone (Prednisone Tab)  40 mg PO DAILY [facility]; Taper


   Stop: 02/24/19 09:59


   Last Admin: 02/12/19 09:43 Dose:  40 mg


Silver Sulfadiazine (Silvadene 1%)  0 ea TOP BID [facility]


   Last Admin: 02/12/19 09:31 Dose:  1 gm


Warfarin Sodium (Coumadin)  10 mg PO 1800 [facility]; Protocol


   Last Admin: 02/11/19 17:25 Dose:  10 mg











- Labs


Labs: 


                                        





                                 02/12/19 05:30 





                                 02/12/19 05:30 





                                        











PT  20.0 SECONDS (9.4-12.5)  H  02/12/19  05:30    


 


INR  1.77   02/12/19  05:30    


 


APTT  63.2 Seconds (26.9-38.3)  H  02/12/19  05:30    














Attending/Attestation





- Attestation


I have personally seen and examined this patient.: Yes


I have fully participated in the care of the patient.: Yes


I have reviewed all pertinent clinical information, including history, physical 

exam and plan: Yes

## 2019-02-11 NOTE — PN
DATE:  02/11/2019



SUBJECTIVE:  The patient is comfortable on nasal O2.  He still has residual

significant bilateral thigh swelling.



OBJECTIVE:

VITAL SIGNS:  Blood pressure 138/63, heart rate 88, temperature 98.5,

respiration 20.

HEENT:  Pale conjunctivae.

CHEST:  Absent breath sounds over the bases.

HEART:  S1 and S2 irregular and distant.

ABDOMEN:  Soft.

EXTREMITIES:  Significant bilateral thigh edema.



LABORATORY DATA:  Hemoglobin and hematocrit 9.6 and 37.0, white count and

platelet count are within normal limits.  Today's SMA-7, sodium 138,

potassium 4.0, chloride 89, CO2 of 43, glucose 117, BUN 21, creatinine 0.8.



ASSESSMENT:

1.  Chronic atrial fibrillation at

2.  Sleep apnea.

3.  Consider right-sided failure.

4.  Chronic venous dermatitis.

5.  Anemia.



RECOMMENDATIONS:  Continue intravenous heparin and a therapeutic regimen. 

For atrial fibrillation, continue Lipitor 40 mg once a day, Lopressor 25 mg

twice a day, Lasix 40 mg intravenously every 8 hours.  The patient will

receive Coumadin 10 mg orally today.  Today's INR is 1.55.







__________________________________________

Vladislav Delgado MD





DD:  02/11/2019 12:49:14

DT:  02/11/2019 12:52:08

Job # [id number]

## 2019-02-11 NOTE — CP.PCM.PN
Subjective





- Date & Time of Evaluation


Date of Evaluation: 02/11/19


Time of Evaluation: 07:37





- Subjective


Subjective: 





PGY-3 for Dr. Mooney





S: Pt is still on may day 6. Back thigh wounds improves, pain controlled. new 

loose stool x 3 but on colace TID. denies cp, sob, n/v/c, dysuria, pain





Objective





- Vital Signs/Intake and Output


Vital Signs (last 24 hours): 


                                        











Temp Pulse Resp BP Pulse Ox


 


 98.5 F   98 H  22   156/75 H  96 


 


 02/11/19 06:00  02/11/19 06:00  02/11/19 06:00  02/11/19 06:00  02/11/19 06:00








Intake and Output: 


                                        











 02/11/19 02/11/19





 06:59 18:59


 


Intake Total 1550 


 


Output Total 8200 


 


Balance -6650 














- Medications


Medications: 


                               Current Medications





Acetaminophen (Tylenol 325mg Tab)  650 mg PO Q6 PRN


   PRN Reason: TEMP>=99.5F


   Last Admin: 02/06/19 02:33 Dose:  650 mg


Acetaminophen (Tylenol 650 Mg Supp)  650 mg RC Q6H PRN


   PRN Reason: TEMP>=99.5F


Albuterol/Ipratropium (Duoneb 3 Mg/0.5 Mg (3 Ml) Ud)  3 ml IH [id number] [facility]


   Last Admin: 02/11/19 01:09 Dose:  3 ml


Atorvastatin Calcium (Lipitor)  40 mg PO DIN [facility]


   Last Admin: 02/10/19 18:38 Dose:  40 mg


Betamethasone/Clotrimazole (Lotrisone)  0 gm TOP BID [facility]


   Last Admin: 02/10/19 18:39 Dose:  1 mg


Docusate Sodium (Colace)  100 mg PO TID [facility]


   Last Admin: 02/10/19 18:34 Dose:  Not Given


Doxycycline Hyclate (Doryx)  100 mg PO Q12 [facility]; Protocol


   Stop: 02/14/19 22:01


   Last Admin: 02/10/19 22:17 Dose:  100 mg


Ergocalciferol (Drisdol 50,000 Intl Units Cap)  1 cap PO Q7D [facility]


   Last Admin: 02/07/19 09:51 Dose:  1 cap


Furosemide (Lasix)  40 mg IVP Q8H [facility]


   Last Admin: 02/11/19 05:29 Dose:  40 mg


Heparin Sodium/Sodium Chloride (Heparin 95149 Units/250ml 1/2 Normal Saline)  

25,000 units in 250 mls @ 20.02 mls/hr IV .[id number] PRN; Protocol


   PRN Reason: ADJUST RATE PER PROTOCOL


   Last Admin: 02/11/19 00:24 Dose:  7.25 units/kg/hr, 17.594 mls/hr


Iron Sucrose 200 mg/ Sodium (Chloride)  110 mls @ 110 mls/hr IVPB DAILY [facility]


   Stop: 02/11/19 10:59


   Last Admin: 02/10/19 10:43 Dose:  110 mls/hr


Methylprednisolone (Solu-Medrol)  20 mg IVP Q8H [facility]


   Last Admin: 02/11/19 05:09 Dose:  20 mg


Metoprolol Tartrate (Lopressor)  25 mg PO BID [facility]


   Last Admin: 02/10/19 18:37 Dose:  25 mg


Mupirocin (Bactroban Ointment)  0 gm TOP DAILY [facility]


   Last Admin: 02/10/19 10:54 Dose:  1 applic


Ondansetron HCl (Zofran Inj)  4 mg IVP Q4H PRN


   PRN Reason: Nausea/Vomiting


Pantoprazole Sodium (Protonix Ec Tab)  40 mg PO 0600 [facility]


   Last Admin: 02/10/19 06:27 Dose:  40 mg


Silver Sulfadiazine (Silvadene 1%)  0 ea TOP BID [facility]


   Last Admin: 02/10/19 18:39 Dose:  1 gm


Warfarin Sodium (Coumadin)  10 mg PO 1800 [facility]; Protocol


   Last Admin: 02/10/19 18:37 Dose:  10 mg











- Labs


Labs: 


                                        





                                 02/11/19 06:10 





                                 02/10/19 05:00 





                                        











PT  17.5 SECONDS (9.4-12.5)  H  02/11/19  06:10    


 


INR  1.55   02/11/19  06:10    


 


APTT  57.1 Seconds (26.9-38.3)  H  02/11/19  06:10    














- Constitutional


Appears: No Acute Distress, Other (breathing comfortable 2NC)





- Head Exam


Head Exam: ATRAUMATIC, NORMAL INSPECTION, NORMOCEPHALIC





- Eye Exam


Eye Exam: EOMI, Normal appearance, PERRL.  absent: Scleral icterus


Pupil Exam: NORMAL ACCOMODATION





- ENT Exam


ENT Exam: Mucous Membranes Moist





- Neck Exam


Additional comments: 





supple. no jvd, large neck circumference





- Respiratory Exam


Respiratory Exam: Decreased Breath Sounds (b/l lung bases), Clear to Ausculation

Bilateral.  absent: Rales, Rhonchi, Wheezes





- Cardiovascular Exam


Cardiovascular Exam: REGULAR RHYTHM, +S1, +S2.  absent: Murmur





- GI/Abdominal Exam


GI & Abdominal Exam: Soft, Normal Bowel Sounds.  absent: Tenderness


Additional comments: 





obese





- Back Exam


Back Exam: absent: CVA tenderness (L), CVA tenderness (R), paraspinal tenderness





- Neurological Exam


Neurological Exam: Alert, Awake, Oriented x3





- Psychiatric Exam


Psychiatric exam: Normal Affect, Normal Mood





- Skin


Skin Exam: Dry, Warm


Additional comments: 





posterior tight with dressing, d/c/i.


wounds with granulation tissues, no pus, no cellulitis


b/l leg pressure dressings on for edema





Assessment and Plan





- Assessment and Plan (Free Text)


Plan: 





Mr Jamison, 58M, PMHx morbid obesity of BMI 70s, b/l thigh cellulitis, and chronic

lymphedema, admitted on 2/5/19 for SOB. He had hypercapnic respiratory distress 

requiring BIPAP and ICU management, downgrades, now on NC and BIPAP HS. 





For new onset A fib, [id number] score 2, on warfarin 10, bridging heparin gtt.





For cellulitis in thighs and b/l chronic venous dermatitis, Podiatry on board. 

He is on Doxycycline, silverdine, mupirocin, clotrimazole/betamethazone.





For COPD/SOB, he is on solumedrol 20 IV q8, duoneb q6.


- taper steroid to 20 IV Q12


- by discharge, may consider add LABA/ICS





For CHF, he is on lasix 40 IV q8, metoprolol 25 BID, lipitor. he is on may, 

recorded [id number] urine/24 hours


- discontinue may. voiding trial; strict i.o


- OOB


- will get PSA





For symptomatic anemia, Hb 8.3 on admission with SOB/A-fib, received 1u pRBC. He

is on venofar IV, 5/5 bags today on Monday 2/11.





Loose stool, hold colace.





Dispo plan: pt agreed to [facility]. Pending therapeutic INR





Consult:


GI = Dr Whittaker


Surgery = Dr Heck


Heme = Dr Orellana


ID = Dr Olvera


Card = Dr Cardona


Podiatry = Dr Islas

## 2019-02-12 LAB
ALBUMIN SERPL-MCNC: 3.4 G/DL (ref 3–4.8)
ALBUMIN/GLOB SERPL: 0.8 {RATIO} (ref 1.1–1.8)
ALT SERPL-CCNC: 18 U/L (ref 7–56)
APTT BLD: 63.2 SECONDS (ref 26.9–38.3)
AST SERPL-CCNC: 56 U/L (ref 17–59)
BASOPHILS # BLD AUTO: 0 K/MM3 (ref 0–2)
BASOPHILS NFR BLD: 0 % (ref 0–3)
BILIRUB DIRECT SERPL-MCNC: 0.2 MG/DL (ref 0–0.4)
BUN SERPL-MCNC: 25 MG/DL (ref 7–21)
CALCIUM SERPL-MCNC: 9 MG/DL (ref 8.4–10.5)
EOSINOPHIL # BLD: 0 10*3/UL (ref 0–0.7)
EOSINOPHIL NFR BLD: 0.2 % (ref 1.5–5)
ERYTHROCYTE [DISTWIDTH] IN BLOOD BY AUTOMATED COUNT: 22.3 % (ref 11.5–14.5)
GFR NON-AFRICAN AMERICAN: > 60
HGB BLD-MCNC: 10.1 G/DL (ref 14–18)
INR PPP: 1.77
LYMPHOCYTES # BLD: 1.9 10*3/UL (ref 1.2–3.4)
LYMPHOCYTES NFR BLD AUTO: 22.5 % (ref 22–35)
MCH RBC QN AUTO: 19.9 PG (ref 25–35)
MCHC RBC AUTO-ENTMCNC: 26 G/DL (ref 31–37)
MCV RBC AUTO: 76.7 FL (ref 80–105)
MONOCYTES # BLD AUTO: 0.6 10*3/UL (ref 0.1–0.6)
MONOCYTES NFR BLD: 6.8 % (ref 1–6)
PLATELET # BLD: 259 10^3/UL (ref 120–450)
PMV BLD AUTO: 9.3 FL (ref 7–11)
PROTHROMBIN TIME: 20 SECONDS (ref 9.4–12.5)
PSA SERPL-MCNC: 0.2 NG/ML
RBC # BLD AUTO: 5.07 10^6/UL (ref 3.5–6.1)
WBC # BLD AUTO: 8.6 10^3/UL (ref 4.5–11)

## 2019-02-12 RX ADMIN — IPRATROPIUM BROMIDE AND ALBUTEROL SULFATE SCH: .5; 3 SOLUTION RESPIRATORY (INHALATION) at 01:27

## 2019-02-12 RX ADMIN — SILVER SULFADIAZINE SCH GM: 10 CREAM TOPICAL at 09:31

## 2019-02-12 RX ADMIN — SILVER SULFADIAZINE SCH: 10 CREAM TOPICAL at 18:15

## 2019-02-12 RX ADMIN — CLOTRIMAZOLE AND BETAMETHASONE DIPROPIONATE SCH APPLIC: 10; .5 CREAM TOPICAL at 09:32

## 2019-02-12 RX ADMIN — MUPIROCIN SCH APPLIC: 20 OINTMENT TOPICAL at 09:32

## 2019-02-12 RX ADMIN — CLOTRIMAZOLE AND BETAMETHASONE DIPROPIONATE SCH APPLIC: 10; .5 CREAM TOPICAL at 18:15

## 2019-02-12 RX ADMIN — IPRATROPIUM BROMIDE AND ALBUTEROL SULFATE SCH ML: .5; 3 SOLUTION RESPIRATORY (INHALATION) at 07:53

## 2019-02-12 RX ADMIN — HEPARIN SODIUM PRN MLS/HR: 10000 INJECTION, SOLUTION INTRAVENOUS at 09:27

## 2019-02-12 RX ADMIN — IPRATROPIUM BROMIDE AND ALBUTEROL SULFATE SCH ML: .5; 3 SOLUTION RESPIRATORY (INHALATION) at 20:53

## 2019-02-12 RX ADMIN — IPRATROPIUM BROMIDE AND ALBUTEROL SULFATE SCH ML: .5; 3 SOLUTION RESPIRATORY (INHALATION) at 13:51

## 2019-02-12 RX ADMIN — HEPARIN SODIUM PRN MLS/HR: 10000 INJECTION, SOLUTION INTRAVENOUS at 23:53

## 2019-02-12 RX ADMIN — PANTOPRAZOLE SODIUM SCH MG: 40 TABLET, DELAYED RELEASE ORAL at 05:25

## 2019-02-12 NOTE — PN
DATE:  02/12/2019



CARDIOLOGY FOLLOWUP



SUBJECTIVE:  The patient's breathing is stable at rest.



PHYSICAL EXAMINATION

VITAL SIGNS:  Blood pressure 128/56, heart rates in the 90s, atrial

fibrillation.

NECK:  Negative JVD.

LUNGS:  Decreased breath sounds bilaterally.

HEART:  Reveal S1 and S2.

EXTREMITIES:  Marked edema in the lower extremities.



LABORATORY DATA:  INR is 1.77.  Chemistries; BUN and creatinine and BUN and

creatinine 25 and 0.8.  Hemoglobin is 10.1.



IMPRESSION:

1.  Right-sided congestive heart failure.

2.  Acute diastolic congestive heart failure.

3.  Aortic insufficiency on echocardiogram.

4.  Marked pedal edema.

5.  Morbid obesity.

6.  Normal left ventricular function on echocardiogram with aortic

insufficiency and mild pulmonary retention.

7.  Atrial fibrillation.



Given these findings, I have discussed with the patient about his need for

anticoagulation given the risk of thromboembolic phenomenon.  The patient

understands, the INR is being adjusted.



Currently the patient is on Lasix three times a day.  I have discussed with

the patient about the need to lose weight.







__________________________________________

Johnny Cardona MD





DD:  02/12/2019 10:17:15

DT:  02/12/2019 10:20:50

Job # [id number]

## 2019-02-12 NOTE — CP.PCM.PN
Subjective





- Date & Time of Evaluation


Date of Evaluation: 02/12/19


Time of Evaluation: 11:18





- Subjective


Subjective: 





PGY-3 for Dr Mooney





No acute complaint per 12 point ros.





Objective





- Vital Signs/Intake and Output


Vital Signs (last 24 hours): 


                                        











Temp Pulse Resp BP Pulse Ox


 


 98.4 F   95 H  18   128/56 L  90 L


 


 02/12/19 06:00  02/12/19 09:28  02/12/19 06:00  02/12/19 09:28  02/12/19 06:00








Intake and Output: 


                                        











 02/12/19 02/12/19





 06:59 18:59


 


Intake Total 1891 250


 


Output Total 6700 


 


Balance -4809 250














- Medications


Medications: 


                               Current Medications





Acetaminophen (Tylenol 325mg Tab)  650 mg PO Q6 PRN


   PRN Reason: TEMP>=99.5F


   Last Admin: 02/06/19 02:33 Dose:  650 mg


Acetaminophen (Tylenol 650 Mg Supp)  650 mg RC Q6H PRN


   PRN Reason: TEMP>=99.5F


Acetazolamide (Diamox 250 Mg Tab)  250 mg PO BID [facility]


   Stop: 02/17/19 10:01


   Last Admin: 02/12/19 09:44 Dose:  250 mg


Albuterol/Ipratropium (Duoneb 3 Mg/0.5 Mg (3 Ml) Ud)  3 ml IH [id number] [facility]


   Last Admin: 02/12/19 07:53 Dose:  3 ml


Atorvastatin Calcium (Lipitor)  40 mg PO DIN [facility]


   Last Admin: 02/11/19 17:26 Dose:  40 mg


Betamethasone/Clotrimazole (Lotrisone)  0 gm TOP BID [facility]


   Last Admin: 02/12/19 09:32 Dose:  1 applic


Docusate Sodium (Colace)  100 mg PO TID [facility]


   Last Admin: 02/10/19 18:34 Dose:  Not Given


Ergocalciferol (Drisdol 50,000 Intl Units Cap)  1 cap PO Q7D [facility]


   Last Admin: 02/07/19 09:51 Dose:  1 cap


Furosemide (Lasix)  40 mg IVP Q8H [facility]


   Last Admin: 02/12/19 05:32 Dose:  40 mg


Heparin Sodium/Sodium Chloride (Heparin 83193 Units/250ml 1/2 Normal Saline)  

25,000 units in 250 mls @ 20.02 mls/hr IV .[id number] PRN; Protocol


   PRN Reason: ADJUST RATE PER PROTOCOL


   Last Admin: 02/12/19 09:27 Dose:  7.25 units/kg/hr, 17.594 mls/hr


Metoprolol Tartrate (Lopressor)  25 mg PO BID [facility]


   Last Admin: 02/12/19 09:28 Dose:  25 mg


Mupirocin (Bactroban Ointment)  0 gm TOP DAILY [facility]


   Last Admin: 02/12/19 09:32 Dose:  1 applic


Ondansetron HCl (Zofran Inj)  4 mg IVP Q4H PRN


   PRN Reason: Nausea/Vomiting


Pantoprazole Sodium (Protonix Ec Tab)  40 mg PO 0600 [facility]


   Last Admin: 02/12/19 05:25 Dose:  40 mg


Prednisone (Prednisone Tab)  40 mg PO DAILY [facility]; Taper


   Stop: 02/24/19 09:59


   Last Admin: 02/12/19 09:43 Dose:  40 mg


Silver Sulfadiazine (Silvadene 1%)  0 ea TOP BID [facility]


   Last Admin: 02/12/19 09:31 Dose:  1 gm


Warfarin Sodium (Coumadin)  10 mg PO 1800 [facility]; Protocol


   Last Admin: 02/11/19 17:25 Dose:  10 mg











- Labs


Labs: 


                                        





                                 02/12/19 05:30 





                                 02/12/19 05:30 





                                        











PT  20.0 SECONDS (9.4-12.5)  H  02/12/19  05:30    


 


INR  1.77   02/12/19  05:30    


 


APTT  63.2 Seconds (26.9-38.3)  H  02/12/19  05:30    














- Constitutional


Appears: No Acute Distress





- Head Exam


Head Exam: ATRAUMATIC, NORMAL INSPECTION, NORMOCEPHALIC





- Eye Exam


Eye Exam: EOMI, Normal appearance, PERRL.  absent: Scleral icterus


Pupil Exam: NORMAL ACCOMODATION





- ENT Exam


ENT Exam: Mucous Membranes Moist





- Neck Exam


Additional comments: 





supple





- Respiratory Exam


Respiratory Exam: Decreased Breath Sounds (b/l lung bases), NORMAL BREATHING 

PATTERN





- Cardiovascular Exam


Cardiovascular Exam: REGULAR RHYTHM, +S1, +S2





- GI/Abdominal Exam


GI & Abdominal Exam: Soft, Normal Bowel Sounds.  absent: Firm, Guarding, Rigid





- Extremities Exam


Extremities Exam: Pedal Edema.  absent: Calf Tenderness


Additional comments: 





dressing d/c/i





- Neurological Exam


Neurological Exam: Alert, Awake, CN II-XII Intact, Oriented x3


Neuro motor strength exam: Left Upper Extremity: 5, Right Upper Extremity: 5, 

Left Lower Extremity: 5, Right Lower Extremity: 5





- Psychiatric Exam


Psychiatric exam: Normal Affect, Normal Mood





- Skin


Skin Exam: Dry, Warm





Assessment and Plan





- Assessment and Plan (Free Text)


Plan: 





Mr Jamison, 58M, PMHx morbid obesity of BMI 70s, b/l thigh cellulitis, and chronic

lymphedema, admitted on 2/5/19 for SOB. He had hypercapnic respiratory distress 

requiring BIPAP and ICU management, downgraded, now on NC and BIPAP HS. 





For new onset A fib, [id number] score 2, on warfarin 10, bridging heparin gtt.





For cellulitis in thighs and b/l chronic venous dermatitis, Podiatry on board. 

Completed 5 days of Doxy. He is on silverdine, mupirocin, clotrimazole/beta

methazone.


- Outpatient follow up Dr Islas [facility] 





For COPD/SOB


- duoneb q6.


- taper prednison





For CHF, he is on lasix 40 IV q8, metoprolol 25 BID, lipitor. 


- Metabolic alkalosis due to lasix volume contraction. Add diamox x 5 days


- OOB


[  ] PSA





For symptomatic anemia, Hb 8.3 on admission with SOB/A-fib, received 1u pRBC. He

is on venofar IV, 5/5 bags today on Monday 2/11.





Loose stool, hold colace.





Dispo plan: pt agreed to [facility]. Pending therapeutic INR





Consult:


GI = Dr Whittaker


Surgery = Dr Heck


Heme = Dr Orellana


ID = Dr Olvera


Card = Dr Cardona


Podiatry = Dr Islas

## 2019-02-12 NOTE — PN
DATE:  02/12/2019



SUBJECTIVE:  The patient is seen lying in the bed in room 261 bed two.  The

patient does not appear to be in any distress.  Overnight nurse's notes

were reviewed.  The patient rested comfortably without any adverse events

documented.  The patient was noted by nurses to have a stage II right and

left gluteal decubitus ulceration.  The patient required BiPap overnight. 

The patient's Weller catheter was removed.



PHYSICAL EXAMINATION:

VITAL SIGNS:  T-max 98.4.  Telemetry atrial fibrillation, heart rate 81,

79, blood pressure 133/72, respirations 18, O2 sat 90% to 96% on oxygen

supplementation on nasal cannula.

GENERAL:  The patient is seen lying in the bed.

HEENT:  Head:  Normocephalic, atraumatic.  HEENT examination shows pinkish

pale conjunctivae.  Anicteric sclerae.  NECK:  Short and supple.

CHEST:  Kyphosis.

LUNGS:  Decreased breath sound at the bases.  Positive upper lung field

rhonchi.

CARDIOVASCULAR:  S1, S2, irregular rhythm.  Positive systolic murmur, left

sternal border, right second intercostal space, left second intercostal

space.

ABDOMEN:  Morbidly obese.  No palpable organomegaly.

GENITALIA:  Male.

EXTREMITIES:  Chronic lymphedema with Ace wraps of the lower legs.

MUSCULOSKELETAL:  Body mass index of _____.  Gait examination not tested.

NEUROLOGICAL:  Without any gross deficit and limited exam.



DIAGNOSTICS DATA:  02/12/2019:  WBC 8.6, hemoglobin and hematocrit 10.1 and

38.9, platelet 259.  Granulocytes 70% segs.  PT 20, INR 1.77.  Sodium 137,

potassium 3.6, chloride 89, CO2 of 45, BUN 25, creatinine 0.8, glucose 112,

calcium 9, phosphorus 5.1, magnesium 1.8.



The patient's microbiology cultures are negative.  Blood transfusion 1 unit

received.  No new imaging studies noted.



IMPRESSION:

1.  New-onset atrial fibrillation.

2.  Possible acute systolic congestive heart failure.

3.  Acute versus acute-on-chronic hypercarbic hypercapnic respiratory

failure with respiratory acidosis, hypercarbia and CO2 narcosis.

4.  Massive lymphedema of the lower extremity.

5.  Super morbid obesity with body mass index of greater than 72.

6.  Hypertension.

7.  Hypoxemia.

8.  Severe deconditioning, bedridden status and possible functional

quadriplegia.

9.  Microcytic anemia with granulocytosis.

10.  Persistent refractory hypercarbia and respiratory acidosis and

hypoxemia.

11.  Possible contraction metabolic alkalosis secondary to diuretics.

12.  Prediabetes.

13.  Hyperphosphatemia.

14.  Hypovitaminosis D.

15.  Status post packed red blood cell transfusion x1.

16.  Left ventricular ejection fraction of 56% with moderate concentric

left ventricular hypertrophy.

17.  Borderline reduced right ventricular systolic function.

18.  Moderately dilated left atrium.

19.  Moderate-to-severe aortic regurgitation with mild mitral regurgitation

and mildly thickened aortic valve.

20.  Mild tricuspid regurgitation, mild pulmonary hypertension with right

ventricular systolic pressure of 36 mmHg.

21.  Gluteal decubitus ulceration stage II.

22.  Bilateral lower extremity noninfected superficial wounds.

23.  Possible obstructive sleep apnea and right-sided diastolic congestive

heart failure.

24.  Coumadin-requiring atrial fibrillation.



PLAN:  At this time, the patient have been ordered serial labs.  Repeat

CBC, PT/PTT.  The patient's current medications are to be continued on with

Bactroban cream, Colace 100 three times a day, Coumadin 10 mg daily, Diamox

250 twice a day, Drisdol 50,000 weekly, DuoNeb nebulizer every 6 hours,

heparin drip to be bridged with Coumadin until INR 2.5, Lasix 40 IV every 8

hours, Lipitor 40 daily, Lopressor 25 mg twice a day, Lotrisone cream,

Protonix 40 mg daily, Silvadene cream twice a day. The patient's

Solu-Medrol 20 b.i.d. will be changed to prednisone 40 mg for 3 days, 30 mg

for 3 days, 20 mg for 3 days and 10 mg for 3 days to stop, Tylenol 650 mg

p.o. suppository p.r.n., Zofran 4 IV every 4 hours p.r.n.  The patient has

been ordered out of bed to chair, physical therapy, occupational therapy. 

The patient is awaiting for discharge and transfer to [facility] when

bed available.  The patient will be continued on the above therapeutic

intervention as ordered.  The patient has been updated about his condition,

diagnosis, test results.  Recommendation by all the physician was explained

to the patient at length and all questions concerned answered, which he

acknowledged and understood.



Dictated and electronically signed, not read.







__________________________________________

Varinder MD Jesica





DD:  02/12/2019 9:29:03

DT:  02/12/2019 9:33:36

[facility] # [id number]

## 2019-02-12 NOTE — CP.PCM.PN
Subjective





- Date & Time of Evaluation


Date of Evaluation: 02/12/19


Time of Evaluation: 09:00





- Subjective


Subjective: 





Comfortable in bed, no shortness of breath at rest, no fevers.





Objective





- Vital Signs/Intake and Output


Vital Signs (last 24 hours): 


                                        











Temp Pulse Resp BP Pulse Ox


 


 98.5 F   88   20   148/63   96 


 


 02/11/19 12:00  02/11/19 12:00  02/11/19 12:00  02/11/19 12:00  02/11/19 06:00








Intake and Output: 


                                        











 02/11/19 02/11/19





 06:59 18:59


 


Intake Total 1550 


 


Output Total 8200 


 


Balance -6650 














- Medications


Medications: 


                               Current Medications





Acetaminophen (Tylenol 325mg Tab)  650 mg PO Q6 PRN


   PRN Reason: TEMP>=99.5F


   Last Admin: 02/06/19 02:33 Dose:  650 mg


Acetaminophen (Tylenol 650 Mg Supp)  650 mg RC Q6H PRN


   PRN Reason: TEMP>=99.5F


Albuterol/Ipratropium (Duoneb 3 Mg/0.5 Mg (3 Ml) Ud)  3 ml IH [id number] [facility]


   Last Admin: 02/11/19 07:48 Dose:  3 ml


Atorvastatin Calcium (Lipitor)  40 mg PO DIN [facility]


   Last Admin: 02/10/19 18:38 Dose:  40 mg


Betamethasone/Clotrimazole (Lotrisone)  0 gm TOP BID [facility]


   Last Admin: 02/11/19 10:09 Dose:  1 mg


Docusate Sodium (Colace)  100 mg PO TID [facility]


   Last Admin: 02/10/19 18:34 Dose:  Not Given


Doxycycline Hyclate (Doryx)  100 mg PO Q12 [facility]; Protocol


   Stop: 02/14/19 22:01


   Last Admin: 02/11/19 10:05 Dose:  100 mg


Ergocalciferol (Drisdol 50,000 Intl Units Cap)  1 cap PO Q7D [facility]


   Last Admin: 02/07/19 09:51 Dose:  1 cap


Furosemide (Lasix)  40 mg IVP Q8H [facility]


   Last Admin: 02/11/19 05:29 Dose:  40 mg


Heparin Sodium/Sodium Chloride (Heparin 87115 Units/250ml 1/2 Normal Saline)  

25,000 units in 250 mls @ 20.02 mls/hr IV .[id number] PRN; Protocol


   PRN Reason: ADJUST RATE PER PROTOCOL


   Last Admin: 02/11/19 00:24 Dose:  7.25 units/kg/hr, 17.594 mls/hr


Methylprednisolone (Solu-Medrol)  20 mg IVP BID [facility]


   Last Admin: 02/11/19 10:06 Dose:  20 mg


Metoprolol Tartrate (Lopressor)  25 mg PO BID [facility]


   Last Admin: 02/11/19 10:05 Dose:  25 mg


Mupirocin (Bactroban Ointment)  0 gm TOP DAILY [facility]


   Last Admin: 02/11/19 10:08 Dose:  1 applic


Ondansetron HCl (Zofran Inj)  4 mg IVP Q4H PRN


   PRN Reason: Nausea/Vomiting


Pantoprazole Sodium (Protonix Ec Tab)  40 mg PO 0600 [facility]


   Last Admin: 02/10/19 06:27 Dose:  40 mg


Silver Sulfadiazine (Silvadene 1%)  0 ea TOP BID [facility]


   Last Admin: 02/11/19 10:09 Dose:  1 gm


Warfarin Sodium (Coumadin)  10 mg PO 1800 [facility]; Protocol


   Last Admin: 02/10/19 18:37 Dose:  10 mg











- Labs


Labs: 


                                        





                                 02/11/19 06:10 





                                 02/11/19 06:10 





                                        











PT  17.5 SECONDS (9.4-12.5)  H  02/11/19  06:10    


 


INR  1.55   02/11/19  06:10    


 


APTT  57.1 Seconds (26.9-38.3)  H  02/11/19  06:10    














- Constitutional


Appears: Chronically Ill





- Head Exam


Head Exam: NORMAL INSPECTION





- ENT Exam


ENT Exam: Mucous Membranes Moist





- Neck Exam


Neck Exam: absent: Meningismus





- Respiratory Exam


Respiratory Exam: Decreased Breath Sounds





- Cardiovascular Exam


Cardiovascular Exam: +S1, +S2





- GI/Abdominal Exam


GI & Abdominal Exam: Soft.  absent: Tenderness





Assessment and Plan





- Assessment and Plan (Free Text)


Plan: 


Assessment


Probable acute bronchitis


chronic lower extremity lymphedema


chronic atrial fibrillation


HTN


morbid obesity with BMI 73





Plan


completed course of PO Doxycycline 


follow up further recommendations of Podiatry


will continue to monitor clinically

## 2019-02-12 NOTE — CP.PCM.PCO
Physician Communication Note





- Physician Communication Note


Physician Communication Note: awaiting card clearance,reg Heparin IV,inr 

subtherapeutic,prior to DC LTACH

## 2019-02-13 VITALS
RESPIRATION RATE: 18 BRPM | TEMPERATURE: 97.9 F | DIASTOLIC BLOOD PRESSURE: 62 MMHG | SYSTOLIC BLOOD PRESSURE: 128 MMHG | HEART RATE: 72 BPM

## 2019-02-13 VITALS — OXYGEN SATURATION: 97 %

## 2019-02-13 LAB
ALBUMIN SERPL-MCNC: 3.5 G/DL (ref 3–4.8)
ALBUMIN/GLOB SERPL: 0.9 {RATIO} (ref 1.1–1.8)
ALT SERPL-CCNC: 22 U/L (ref 7–56)
APTT BLD: 66.3 SECONDS (ref 26.9–38.3)
AST SERPL-CCNC: 60 U/L (ref 17–59)
BASOPHILS # BLD AUTO: 0 K/MM3 (ref 0–2)
BASOPHILS NFR BLD: 0 % (ref 0–3)
BILIRUB DIRECT SERPL-MCNC: 0.1 MG/DL (ref 0–0.4)
BUN SERPL-MCNC: 23 MG/DL (ref 7–21)
CALCIUM SERPL-MCNC: 9.3 MG/DL (ref 8.4–10.5)
EOSINOPHIL # BLD: 0 10*3/UL (ref 0–0.7)
EOSINOPHIL NFR BLD: 0.2 % (ref 1.5–5)
ERYTHROCYTE [DISTWIDTH] IN BLOOD BY AUTOMATED COUNT: 22.9 % (ref 11.5–14.5)
GFR NON-AFRICAN AMERICAN: > 60
HGB BLD-MCNC: 10.3 G/DL (ref 14–18)
INR PPP: 2.09
LYMPHOCYTES # BLD: 1.8 10*3/UL (ref 1.2–3.4)
LYMPHOCYTES NFR BLD AUTO: 21.9 % (ref 22–35)
MCH RBC QN AUTO: 20 PG (ref 25–35)
MCHC RBC AUTO-ENTMCNC: 25.8 G/DL (ref 31–37)
MCV RBC AUTO: 77.6 FL (ref 80–105)
MONOCYTES # BLD AUTO: 0.6 10*3/UL (ref 0.1–0.6)
MONOCYTES NFR BLD: 7.6 % (ref 1–6)
PLATELET # BLD: 254 10^3/UL (ref 120–450)
PMV BLD AUTO: 9.6 FL (ref 7–11)
PROTHROMBIN TIME: 23.6 SECONDS (ref 9.4–12.5)
RBC # BLD AUTO: 5.14 10^6/UL (ref 3.5–6.1)
WBC # BLD AUTO: 8.4 10^3/UL (ref 4.5–11)

## 2019-02-13 RX ADMIN — CLOTRIMAZOLE AND BETAMETHASONE DIPROPIONATE SCH APPLIC: 10; .5 CREAM TOPICAL at 09:56

## 2019-02-13 RX ADMIN — MUPIROCIN SCH APPLIC: 20 OINTMENT TOPICAL at 09:56

## 2019-02-13 RX ADMIN — IPRATROPIUM BROMIDE AND ALBUTEROL SULFATE SCH ML: .5; 3 SOLUTION RESPIRATORY (INHALATION) at 13:20

## 2019-02-13 RX ADMIN — PANTOPRAZOLE SODIUM SCH MG: 40 TABLET, DELAYED RELEASE ORAL at 05:48

## 2019-02-13 RX ADMIN — IPRATROPIUM BROMIDE AND ALBUTEROL SULFATE SCH ML: .5; 3 SOLUTION RESPIRATORY (INHALATION) at 08:02

## 2019-02-13 RX ADMIN — IPRATROPIUM BROMIDE AND ALBUTEROL SULFATE SCH: .5; 3 SOLUTION RESPIRATORY (INHALATION) at 02:45

## 2019-02-13 RX ADMIN — SILVER SULFADIAZINE SCH GM: 10 CREAM TOPICAL at 09:56

## 2019-02-13 NOTE — PN
DATE:  02/13/2019



SUBJECTIVE:  The patient is comfortable in bed.  No shortness of breath.



OBJECTIVE:

VITAL SIGNS:  Blood pressure 131/72, the heart rates in the 70s.

NECK:  Negative JVD.

LUNGS:  Without rales.

HEART:  S1, S2.

EXTREMITIES:  Bandage noted.



LABORATORY DATA:  Hemoglobin is 13.3.  Chemistries; BUN and creatinine are

unremarkable.  Potassium is 3.8.  INR is 2.09.



IMPRESSION:

1.  Morbid obesity.

2.  Recurrent pedal edema.

3.  Acute diastolic congestive heart failure.

4.  Aortic insufficiency.

5.  Atrial fibrillation.



Given these findings, the patient is scheduled for transfer to either a

skilled nursing facility or an LTAC.  We will continue the Coumadin.  We

will discontinue the IV heparin now.







__________________________________________

Johnny Cardona MD





DD:  02/13/2019 8:31:46

DT:  02/13/2019 8:34:55

Job # [id number]

## 2019-02-13 NOTE — DS
HISTORY OF PRESENT ILLNESS:  The patient is in room 261, bed 2.  The

patient still seen lying in the bariatric bed.  Overnight nurse's notes

were reviewed.  The patient refused BiPAP overnight as per the nurse's

notes.  There was no respiratory compromise noted, notified or documented.



PHYSICAL EXAMINATION:

GENERAL:  The patient is seen lying in the bed.

VITAL SIGNS:  T-max 97.6.  Telemetry shows atrial fibrillation, heart rate

74 to 89, blood pressure 131/72, respirations 20, O2 sat 97%.

HEENT:  Head is normocephalic, atraumatic.  HEENT examination shows pinkish

pale conjunctivae.  Anicteric sclerae.  No oropharyngeal lesion.

NECK:  Short and supple.

CHEST:  Morbidly obese.  Decreased breath sound at the bases.  No audible

crackle, rales or wheezing at this time.

CARDIOVASCULAR:  S1, S2, irregular rhythm.  Positive systolic murmur at

left sternal border, right second intercostal space, left second

intercostal space.

ABDOMEN:  Morbidly obese.  Unable to palpate any hepatosplenomegaly or

organomegaly.

GENITALIA:  Male.  Weller catheter removed.

EXTREMITIES:  Still shows massive lymphedema of the lower extremity which

has decreased, but not completely resolved.

NEUROLOGIC:  The patient is alert, awake, oriented x3.   Cranial nerves

II-XII limited.  Gait examination is limited.

MUSCULOSKELETAL:  Shows a body mass index of greater than 72.

PSYCHIATRIC:  Not applicable.



MEDICATIONS:  Text, text, text.



DIAGNOSTICS:  On 02/13/2019, 13, WBC 8.4, hemoglobin/hematocrit 10.3, 39.9,

platelet 254.  PT is now up to 23.6.  INR 2.09.  Sodium 140, potassium 3.8,

chloride 92, CO2 43, BUN 23, creatinine 0.9, glucose 92, calcium 9.3,

phosphorus 5.7 and magnesium 2.0, AST 60.



FINAL IMPRESSION AND DISCHARGE DIAGNOSES:

1.  New-onset atrial fibrillation.

2.  Acute systolic congestive heart failure versus acute diastolic

congestive heart failure.

3.  Pulmonary hypertension with right pulmonary hypertension with tricuspid

regurgitation.

4.  Obstructive sleep apnea.

5.  Super morbid obesity with body weight of more than 535 pounds and BMI

of greater than 72.

6.  Massive bilateral lower extremity lymphedema.

7.  Bilateral lower extremity venous stasis superficial ulceration and

dermatitis.

8.  Hypoxic hypercarbic respiratory failure with respiratory acidosis.

9.  Severe gait dysfunction and deconditioning and possible functional

quadriplegia.

10.  Normocytic anemia.

11.  Possible diuretic-induced metabolic alkalosis.

12.  Possible transaminitis.

13.  Mild hyperphosphatemia.

14.  Hypertension.

15.  History of super morbid obesity.

16.  Anemia.

17.  Gait dysfunction.



PLAN:  At this time, the patient is almost achieved therapeutic INR with

Coumadin.  Heparin drip will be discontinued and the patient will be

considered for discharge to long-term acute [facility] hospitalization wherever

the patient is approved and accepted.



The patient at present is to be to be discharged to [facility]

hospital for further care and management.



The patient's discharge medications are as per updated, revised and updated

ambulatory orders which has been updated.  Please refer to the updated

ambulatory orders for discharge medications.



DISCHARGE FOLLOWUP:  The patient has been advised to follow up  _____

immediately upon discharge from [facility] hospitalization.



During this hospitalization, the patient was extensively daily explained

about his medical condition, diagnosis, diagnostic test results and

recommendation by all the physician involved the care of the patient and

all the details were explained to the patient in layman's language.  All

questions concerned answered.



Time spent in the discharge process 45 minutes.



Dictated and electronically signed, not read.







__________________________________________

Varinder Mooney MD





DD:  02/13/2019 10:54:59

DT:  02/13/2019 10:58:41

Job # [id number]

## 2019-02-13 NOTE — CP.PCM.PN
Subjective





- Date & Time of Evaluation


Date of Evaluation: 02/13/19


Time of Evaluation: 07:49





- Subjective


Subjective: 





PGY-3 for Dr Mooney





Pt has 7L U/O. Pt did not use Bipap last night because he wanted to wait for a 

couple of hours but forgot to notify the resp therapist to come back and he 

dozed off. No acute complaint per 12 pt ROS





Objective





- Vital Signs/Intake and Output


Vital Signs (last 24 hours): 


                                        











Temp Pulse Resp BP Pulse Ox


 


 97.6 F   82   20   131/72   97 


 


 02/13/19 06:00  02/13/19 06:00  02/13/19 06:00  02/13/19 06:00  02/13/19 06:00








Intake and Output: 


                                        











 02/13/19 02/13/19





 06:59 18:59


 


Intake Total 970 


 


Output Total 3500 


 


Balance -2530 














- Medications


Medications: 


                               Current Medications





Acetaminophen (Tylenol 325mg Tab)  650 mg PO Q6 PRN


   PRN Reason: TEMP>=99.5F


   Last Admin: 02/06/19 02:33 Dose:  650 mg


Acetaminophen (Tylenol 650 Mg Supp)  650 mg RC Q6H PRN


   PRN Reason: TEMP>=99.5F


Acetazolamide (Diamox 250 Mg Tab)  250 mg PO BID [facility]


   Stop: 02/17/19 10:01


   Last Admin: 02/12/19 18:13 Dose:  250 mg


Albuterol/Ipratropium (Duoneb 3 Mg/0.5 Mg (3 Ml) Ud)  3 ml IH [id number] [facility]


   Last Admin: 02/13/19 02:45 Dose:  Not Given


Atorvastatin Calcium (Lipitor)  40 mg PO DIN [facility]


   Last Admin: 02/12/19 18:12 Dose:  40 mg


Betamethasone/Clotrimazole (Lotrisone)  0 gm TOP BID [facility]


   Last Admin: 02/12/19 18:15 Dose:  1 applic


Docusate Sodium (Colace)  100 mg PO TID [facility]


   Last Admin: 02/10/19 18:34 Dose:  Not Given


Ergocalciferol (Drisdol 50,000 Intl Units Cap)  1 cap PO Q7D [facility]


   Last Admin: 02/07/19 09:51 Dose:  1 cap


Furosemide (Lasix)  40 mg IVP Q8H [facility]


   Last Admin: 02/13/19 05:48 Dose:  40 mg


Heparin Sodium/Sodium Chloride (Heparin 90967 Units/250ml 1/2 Normal Saline)  

25,000 units in 250 mls @ 20.02 mls/hr IV .[id number] PRN; Protocol


   PRN Reason: ADJUST RATE PER PROTOCOL


   Last Admin: 02/12/19 23:53 Dose:  7.25 units/kg/hr, 17.594 mls/hr


Metoprolol Tartrate (Lopressor)  25 mg PO BID [facility]


   Last Admin: 02/12/19 18:12 Dose:  25 mg


Mupirocin (Bactroban Ointment)  0 gm TOP DAILY [facility]


   Last Admin: 02/12/19 09:32 Dose:  1 applic


Ondansetron HCl (Zofran Inj)  4 mg IVP Q4H PRN


   PRN Reason: Nausea/Vomiting


Pantoprazole Sodium (Protonix Ec Tab)  40 mg PO 0600 [facility]


   Last Admin: 02/13/19 05:48 Dose:  40 mg


Prednisone (Prednisone Tab)  40 mg PO DAILY [facility]; Taper


   Stop: 02/24/19 09:59


   Last Admin: 02/12/19 09:43 Dose:  40 mg


Silver Sulfadiazine (Silvadene 1%)  0 ea TOP BID [facility]


   Last Admin: 02/12/19 18:15 Dose:  Not Given


Warfarin Sodium (Coumadin)  10 mg PO 1800 [facility]; Protocol


   Last Admin: 02/11/19 17:25 Dose:  10 mg











- Labs


Labs: 


                                        





                                 02/13/19 05:30 





                                 02/13/19 05:30 





                                        











PT  23.6 SECONDS (9.4-12.5)  H  02/13/19  05:30    


 


INR  2.09   02/13/19  05:30    


 


APTT  66.3 Seconds (26.9-38.3)  H  02/13/19  05:30    














- Constitutional


Appears: Non-toxic





- Head Exam


Head Exam: ATRAUMATIC, NORMAL INSPECTION, NORMOCEPHALIC





- Eye Exam


Eye Exam: EOMI, Normal appearance, PERRL.  absent: Scleral icterus


Pupil Exam: NORMAL ACCOMODATION





- ENT Exam


ENT Exam: Mucous Membranes Moist





- Neck Exam


Additional comments: 





supple





- Respiratory Exam


Respiratory Exam: Decreased Breath Sounds (b/l lung bases), Clear to Ausculation

Bilateral.  absent: Rales, Rhonchi, Wheezes





- Cardiovascular Exam


Cardiovascular Exam: REGULAR RHYTHM, +S1, +S2, Murmur





- GI/Abdominal Exam


GI & Abdominal Exam: Soft, Normal Bowel Sounds.  absent: Guarding, Rigid, 

Tenderness


Additional comments: 





obese





- Extremities Exam


Extremities Exam: Pedal Edema (on compression dressings. ).  absent: Calf 

Tenderness





- Neurological Exam


Neurological Exam: Alert, Awake, CN II-XII Intact, Oriented x3





- Psychiatric Exam


Psychiatric exam: Normal Affect, Normal Mood





- Skin


Skin Exam: Dry, Warm





Assessment and Plan





- Assessment and Plan (Free Text)


Plan: 





Mr Jamison, 58M, PMHx morbid obesity of BMI 70s, b/l thigh cellulitis, and chronic

lymphedema, admitted on 2/5/19 for SOB. He had hypercapnic respiratory distress 

requiring BIPAP and ICU management, downgraded, now on NC and BIPAP HS. 





For new onset A fib, [id number] score 2, on warfarin 10, bridging heparin gtt.





For cellulitis in thighs and b/l chronic venous dermatitis, Podiatry on board. 

Completed 5 days of Doxy. He is on silverdine, mupirocin, 

clotrimazole/betamethazone.


- Outpatient follow up Dr Islas [facility] 





For COPD/SOB


- duoneb q6. NC 4L day; Bipap HS


- taper prednison





For CHF, he is on lasix 40 IV q8, metoprolol 25 BID, lipitor. 


- Metabolic alkalosis due to lasix volume contraction. Add diamox x 5 days (day 

2)


- OOB with assist


- PSA normal





For symptomatic anemia, Hb 8.3 on admission with SOB/A-fib, received 1u pRBC. He

is on venofar IV, 5/5 bags today on Monday 2/11.


Obese class 3, BMI 72.6 -  for weight loss and appropriate diet


Dispo plan: pt agreed to [facility]. Pending therapeutic INR





Consult:


GI = Dr Whittaker


Surgery = Dr Heck


Heme = Dr Orellana


ID = Dr Olvera


Card = Dr Cardona


Podiatry = Dr Islas

## 2019-02-13 NOTE — IP.NPCORE
Heart Failure Core Measure





- Heart Failure


Ejection Fraction: 40 % or Greater


Left Ventricular Function to be assessed after discharge: No


ACE Inhibitor Prescribed: No


Contraindication/Reason for not providing: diastolic chf


Beta-Blocker Prescribed: Metoprolol Succinate


Angiotensin II Receptor Blocker Prescribed: No


Contraindication/Reason for not providing: diastolic chf


AnticoagulationTherapy for Atrial Fibrillation/Atrialflutter: Yes


Aldosterone Antagonist Prescribed: No


Contraindication/Reason for not providing: dialstolic chf


Hydralazine Nitrate Prescribed: No


Contraindication/Reason for not providing: diastolic chf


Implantable Cardioverter Defibrillator Therapy: No


Contraindication/Reason for not providing: diastolic chf


Cardiac Resynchronization Therapy Prescribed: No


Contraindication/Reason for not providing: diastolic chf





- Follow up


Will be discharged to: [facility]

## 2019-02-13 NOTE — CP.PCM.APN
Objective





- Vital Signs/Intake and Output


Vital Signs (last 24 hours): 


                                        











Temp Pulse Resp BP Pulse Ox


 


 97.9 F   72   18   128/62   97 


 


 02/13/19 12:00  02/13/19 12:00  02/13/19 12:00  02/13/19 14:10  02/13/19 06:00








Intake and Output: 


                                        











 02/13/19 02/13/19





 06:59 18:59


 


Intake Total 970 


 


Output Total 3500 


 


Balance -2530 














- Medications


Medications: 


                               Current Medications





Acetaminophen (Tylenol 325mg Tab)  650 mg PO Q6 PRN


   PRN Reason: TEMP>=99.5F


   Last Admin: 02/06/19 02:33 Dose:  650 mg


Acetaminophen (Tylenol 650 Mg Supp)  650 mg RC Q6H PRN


   PRN Reason: TEMP>=99.5F


Acetazolamide (Diamox 250 Mg Tab)  [facility]ID [facility]


   Stop: 02/17/19 10:01


   Last Admin: 02/13/19 09:57 Dose:  250 mg


Albuterol/Ipratropium (Duoneb 3 Mg/0.5 Mg (3 Ml) Ud)  3 ml IH [id number] [facility]


   Last Admin: 02/13/19 13:20 Dose:  3 ml


Atorvastatin Calcium (Lipitor)  40 mg PO DIN [facility]


   Last Admin: 02/12/19 18:12 Dose:  40 mg


Betamethasone/Clotrimazole (Lotrisone)  0 gm TOP BID [facility]


   Last Admin: 02/13/19 09:56 Dose:  1 applic


Docusate Sodium (Colace)  100 mg PO TID [facility]


   Last Admin: 02/10/19 18:34 Dose:  Not Given


Ergocalciferol (Drisdol 50,000 Intl Units Cap)  1 cap PO Q7D [facility]


   Last Admin: 02/07/19 09:51 Dose:  1 cap


Furosemide (Lasix)  40 mg IVP Q8H [facility]


   Last Admin: 02/13/19 14:10 Dose:  40 mg


Metoprolol Tartrate (Lopressor)  25 mg PO BID [facility]


   Last Admin: 02/13/19 09:57 Dose:  25 mg


Mupirocin (Bactroban Ointment)  0 gm TOP DAILY [facility]


   Last Admin: 02/13/19 09:56 Dose:  1 applic


Ondansetron HCl (Zofran Inj)  4 mg IVP Q4H PRN


   PRN Reason: Nausea/Vomiting


Pantoprazole Sodium (Protonix Ec Tab)  40 mg PO 0600 [facility]


   Last Admin: 02/13/19 05:48 Dose:  40 mg


Prednisone (Prednisone Tab)  40 mg PO DAILY [facility]; Taper


   Stop: 02/24/19 09:59


   Last Admin: 02/13/19 09:56 Dose:  40 mg


Silver Sulfadiazine (Silvadene 1%)  0 ea TOP BID [facility]


   Last Admin: 02/13/19 09:56 Dose:  1 gm











- Labs


Labs: 


                                        





                                 02/13/19 05:30 





                                 02/13/19 05:30 





                                        











PT  23.6 SECONDS (9.4-12.5)  H  02/13/19  05:30    


 


INR  2.09   02/13/19  05:30    


 


APTT  66.3 Seconds (26.9-38.3)  H  02/13/19  05:30    














BPCI/TIC





- BPCIA/TIC


Educated pt/family on BPCIA/CIR/Med to Bed Programs: Yes


Flyers given, including CMS Beneficiary letter: Yes


Pt/family verbalized understanding & agreed to program: Yes

## 2019-02-13 NOTE — CP.PCM.PN
Subjective





- Date & Time of Evaluation


Date of Evaluation: 02/13/19


Time of Evaluation: 09:50





- Subjective


Subjective: 





Afebrile, comfortable.





Objective





- Vital Signs/Intake and Output


Vital Signs (last 24 hours): 


                                        











Temp Pulse Resp BP Pulse Ox


 


 98.4 F   90   18   128/56 L  90 L


 


 02/12/19 06:00  02/12/19 10:00  02/12/19 06:00  02/12/19 09:28  02/12/19 06:00








Intake and Output: 


                                        











 02/12/19 02/12/19





 06:59 18:59


 


Intake Total 1891 250


 


Output Total 6700 


 


Balance -4809 250














- Medications


Medications: 


                               Current Medications





Acetaminophen (Tylenol 325mg Tab)  650 mg PO Q6 PRN


   PRN Reason: TEMP>=99.5F


   Last Admin: 02/06/19 02:33 Dose:  650 mg


Acetaminophen (Tylenol 650 Mg Supp)  650 mg RC Q6H PRN


   PRN Reason: TEMP>=99.5F


Acetazolamide (Diamox 250 Mg Tab)  250 mg PO BID [facility]


   Stop: 02/17/19 10:01


   Last Admin: 02/12/19 09:44 Dose:  250 mg


Albuterol/Ipratropium (Duoneb 3 Mg/0.5 Mg (3 Ml) Ud)  3 ml IH [id number] [facility]


   Last Admin: 02/12/19 13:51 Dose:  3 ml


Atorvastatin Calcium (Lipitor)  40 mg PO DIN [facility]


   Last Admin: 02/11/19 17:26 Dose:  40 mg


Betamethasone/Clotrimazole (Lotrisone)  0 gm TOP BID [facility]


   Last Admin: 02/12/19 09:32 Dose:  1 applic


Docusate Sodium (Colace)  100 mg PO TID [facility]


   Last Admin: 02/10/19 18:34 Dose:  Not Given


Ergocalciferol (Drisdol 50,000 Intl Units Cap)  1 cap PO Q7D [facility]


   Last Admin: 02/07/19 09:51 Dose:  1 cap


Furosemide (Lasix)  40 mg IVP Q8H [facility]


   Last Admin: 02/12/19 05:32 Dose:  40 mg


Heparin Sodium/Sodium Chloride (Heparin 38599 Units/250ml 1/2 Normal Saline)  

25,000 units in 250 mls @ 20.02 mls/hr IV .[id number] PRN; Protocol


   PRN Reason: ADJUST RATE PER PROTOCOL


   Last Admin: 02/12/19 09:27 Dose:  7.25 units/kg/hr, 17.594 mls/hr


Metoprolol Tartrate (Lopressor)  25 mg PO BID [facility]


   Last Admin: 02/12/19 09:28 Dose:  25 mg


Mupirocin (Bactroban Ointment)  0 gm TOP DAILY [facility]


   Last Admin: 02/12/19 09:32 Dose:  1 applic


Ondansetron HCl (Zofran Inj)  4 mg IVP Q4H PRN


   PRN Reason: Nausea/Vomiting


Pantoprazole Sodium (Protonix Ec Tab)  40 mg PO 0600 [facility]


   Last Admin: 02/12/19 05:25 Dose:  40 mg


Prednisone (Prednisone Tab)  40 mg PO DAILY [facility]; Taper


   Stop: 02/24/19 09:59


   Last Admin: 02/12/19 09:43 Dose:  40 mg


Silver Sulfadiazine (Silvadene 1%)  0 ea TOP BID [facility]


   Last Admin: 02/12/19 09:31 Dose:  1 gm


Warfarin Sodium (Coumadin)  10 mg PO 1800 [facility]; Protocol


   Last Admin: 02/11/19 17:25 Dose:  10 mg











- Labs


Labs: 


                                        





                                 02/12/19 05:30 





                                 02/12/19 05:30 





                                        











PT  20.0 SECONDS (9.4-12.5)  H  02/12/19  05:30    


 


INR  1.77   02/12/19  05:30    


 


APTT  63.2 Seconds (26.9-38.3)  H  02/12/19  05:30    














- Constitutional


Appears: Chronically Ill





- Head Exam


Head Exam: NORMAL INSPECTION





- Respiratory Exam


Respiratory Exam: Decreased Breath Sounds





- Cardiovascular Exam


Cardiovascular Exam: +S1, +S2





- GI/Abdominal Exam


GI & Abdominal Exam: Soft.  absent: Tenderness





Assessment and Plan





- Assessment and Plan (Free Text)


Plan: 





Assessment


Probable acute bronchitis, clinically improved and S/P treatment


chronic lower extremity lymphedema


chronic atrial fibrillation


HTN


morbid obesity with BMI 73





Plan


completed course of PO Doxycycline 


follow up further recommendations of Podiatry and should follow up with them as 

an outpatient

## 2019-02-13 NOTE — CP.PCM.PN
<Aaliyah Ceballosalberto - Last Filed: 02/13/19 13:02>





Subjective





- Date & Time of Evaluation


Date of Evaluation: 02/13/19


Time of Evaluation: 13:02





- Subjective


Subjective: 


Podiatry consult note for Dr. Huffman





57 y/o male seen and evaluated in the ICU for bilateral leg wounds. Patient 

alert, awake, states he is feeling much better. Patient also states the swelling

in his legs has decreased. Patient is resting comfortably in bed, and denies any

acute overnight events. Patient to be transferred to [facility] for further rehab, 

patient aware he has to follow up with Podiatry as outpatient








Objective





- Vital Signs/Intake and Output


Vital Signs (last 24 hours): 


                                        











Temp Pulse Resp BP Pulse Ox


 


 97.9 F   72   18   128/62   97 


 


 02/13/19 12:00  02/13/19 12:00  02/13/19 12:00  02/13/19 12:00  02/13/19 06:00








Intake and Output: 


                                        











 02/13/19 02/13/19





 06:59 18:59


 


Intake Total 970 


 


Output Total 3500 


 


Balance -2530 














- Medications


Medications: 


                               Current Medications





Acetaminophen (Tylenol 325mg Tab)  650 mg PO Q6 PRN


   PRN Reason: TEMP>=99.5F


   Last Admin: 02/06/19 02:33 Dose:  650 mg


Acetaminophen (Tylenol 650 Mg Supp)  650 mg RC Q6H PRN


   PRN Reason: TEMP>=99.5F


Acetazolamide (Diamox 250 Mg Tab)  250 mg PO BID [facility]


   Stop: 02/17/19 10:01


   Last Admin: 02/13/19 09:57 Dose:  250 mg


Albuterol/Ipratropium (Duoneb 3 Mg/0.5 Mg (3 Ml) Ud)  3 ml IH [id number] [facility]


   Last Admin: 02/13/19 08:02 Dose:  3 ml


Atorvastatin Calcium (Lipitor)  40 mg PO DIN [facility]


   Last Admin: 02/12/19 18:12 Dose:  40 mg


Betamethasone/Clotrimazole (Lotrisone)  0 gm TOP BID [facility]


   Last Admin: 02/13/19 09:56 Dose:  1 applic


Docusate Sodium (Colace)  100 mg PO TID [facility]


   Last Admin: 02/10/19 18:34 Dose:  Not Given


Ergocalciferol (Drisdol 50,000 Intl Units Cap)  1 cap PO Q7D [facility]


   Last Admin: 02/07/19 09:51 Dose:  1 cap


Furosemide (Lasix)  40 mg IVP Q8H [facility]


   Last Admin: 02/13/19 05:48 Dose:  40 mg


Metoprolol Tartrate (Lopressor)  25 mg PO BID [facility]


   Last Admin: 02/13/19 09:57 Dose:  25 mg


Mupirocin (Bactroban Ointment)  0 gm TOP DAILY [facility]


   Last Admin: 02/13/19 09:56 Dose:  1 applic


Ondansetron HCl (Zofran Inj)  4 mg IVP Q4H PRN


   PRN Reason: Nausea/Vomiting


Pantoprazole Sodium (Protonix Ec Tab)  40 mg PO 0600 [facility]


   Last Admin: 02/13/19 05:48 Dose:  40 mg


Prednisone (Prednisone Tab)  40 mg PO DAILY [facility]; Taper


   Stop: 02/24/19 09:59


   Last Admin: 02/13/19 09:56 Dose:  40 mg


Silver Sulfadiazine (Silvadene 1%)  0 ea TOP BID [facility]


   Last Admin: 02/13/19 09:56 Dose:  1 gm











- Labs


Labs: 


                                        





                                 02/13/19 05:30 





                                 02/13/19 05:30 





                                        











PT  23.6 SECONDS (9.4-12.5)  H  02/13/19  05:30    


 


INR  2.09   02/13/19  05:30    


 


APTT  66.3 Seconds (26.9-38.3)  H  02/13/19  05:30    














- Constitutional


Appears: Well, Non-toxic, No Acute Distress





- Head Exam


Head Exam: ATRAUMATIC, NORMOCEPHALIC





- Extremities Exam


Additional comments: 


Bilateral Lower Extremity Exam





VASC: DP and PT non-palpable secondary to swelling, however, seen to be 

improving due to compression, TG within normal limits





DERM





RIGHT: superficial wounds noted to the lateral aspect of the right leg and to 

the posterior thigh, 100% granular base, no probe to bone, no malodor, minimal 

drainage, no signs of infection noted





LEFT: small superficial wound noted to the medial aspect of the left leg,  100% 

granular base, no probe to bone, no malodor, no drainage, no signs of infection 

noted, significant lymphedema noted bilaterally, however it is improving





NEURO: grossly intact now





ORTHO: pain with movement and range of motion, minimal pain on palpation to 

wounds











- Neurological Exam


Neurological Exam: Alert, Awake, Oriented x3





- Psychiatric Exam


Psychiatric exam: Normal Affect, Normal Mood





Assessment and Plan





- Assessment and Plan (Free Text)


Assessment: 


57 y/o male patient seen with superficial wounds to bilateral lower extremity, 

non-infected





Plan: 


Patient seen and evaluated with Dr. Huffman


Plan discussed with attending


Chart, labs and vitals were reviewed, afebrile, absent leukocytosis


Wound care: clean with saline and dress with bactroban, maxorb, optifoam, and 

ACE bilaterally


Lotrisone cream applied to bilateral feet for itching


Patient to follow up with Podiatry as outpatient


Patient stable from podiatry standpoint, no intervention required


Podiatry will continue to follow








<Grzegorz Huffman - Last Filed: 02/13/19 17:54>





Objective





- Vital Signs/Intake and Output


Vital Signs (last 24 hours): 


                                        











Temp Pulse Resp BP Pulse Ox


 


 97.9 F   72   18   128/62   97 


 


 02/13/19 12:00  02/13/19 12:00  02/13/19 12:00  02/13/19 14:10  02/13/19 06:00








Intake and Output: 


                                        











 02/13/19 02/13/19





 06:59 18:59


 


Intake Total 970 


 


Output Total 3500 


 


Balance -2530 














- Medications


Medications: 


                               Current Medications





Acetaminophen (Tylenol 325mg Tab)  650 mg PO Q6 PRN


   PRN Reason: TEMP>=99.5F


   Last Admin: 02/06/19 02:33 Dose:  650 mg


Acetaminophen (Tylenol 650 Mg Supp)  650 mg RC Q6H PRN


   PRN Reason: TEMP>=99.5F


Acetazolamide (Diamox 250 Mg Tab)  250 mg PO BID [facility]


   Stop: 02/17/19 10:01


   Last Admin: 02/13/19 09:57 Dose:  250 mg


Albuterol/Ipratropium (Duoneb 3 Mg/0.5 Mg (3 Ml) Ud)  3 ml IH [id number] [facility]


   Last Admin: 02/13/19 13:20 Dose:  3 ml


Atorvastatin Calcium (Lipitor)  40 mg PO DIN [facility]


   Last Admin: 02/12/19 18:12 Dose:  40 mg


Betamethasone/Clotrimazole (Lotrisone)  0 gm TOP BID [facility]


   Last Admin: 02/13/19 09:56 Dose:  1 applic


Docusate Sodium (Colace)  100 mg PO TID [facility]


   Last Admin: 02/10/19 18:34 Dose:  Not Given


Ergocalciferol (Drisdol 50,000 Intl Units Cap)  1 cap PO Q7D [facility]


   Last Admin: 02/07/19 09:51 Dose:  1 cap


Furosemide (Lasix)  40 mg IVP Q8H [facility]


   Last Admin: 02/13/19 14:10 Dose:  40 mg


Metoprolol Tartrate (Lopressor)  25 mg PO BID [facility]


   Last Admin: 02/13/19 09:57 Dose:  25 mg


Mupirocin (Bactroban Ointment)  0 gm TOP DAILY [facility]


   Last Admin: 02/13/19 09:56 Dose:  1 applic


Ondansetron HCl (Zofran Inj)  4 mg IVP Q4H PRN


   PRN Reason: Nausea/Vomiting


Pantoprazole Sodium (Protonix Ec Tab)  40 mg PO 0600 [facility]


   Last Admin: 02/13/19 05:48 Dose:  40 mg


Prednisone (Prednisone Tab)  40 mg PO DAILY [facility]; Taper


   Stop: 02/24/19 09:59


   Last Admin: 02/13/19 09:56 Dose:  40 mg


Silver Sulfadiazine (Silvadene 1%)  0 ea TOP BID [facility]


   Last Admin: 02/13/19 09:56 Dose:  1 gm











- Labs


Labs: 


                                        





                                 02/13/19 05:30 





                                 02/13/19 05:30 





                                        











PT  23.6 SECONDS (9.4-12.5)  H  02/13/19  05:30    


 


INR  2.09   02/13/19  05:30    


 


APTT  66.3 Seconds (26.9-38.3)  H  02/13/19  05:30    














Attending/Attestation





- Attestation


I have personally seen and examined this patient.: Yes


I have fully participated in the care of the patient.: Yes


I have reviewed all pertinent clinical information, including history, physical 

exam and plan: Yes

## 2019-02-13 NOTE — CP.PCM.DIS
Provider





- Provider


Date of Admission: 


02/05/19 05:38





Attending physician: 


Varinder Mooney MD





Primary care physician: 


Jesica


Consults: 








02/05/19 06:18


Physician Consult Routine 


   Comment: CHF/AFIB


   Consulting Provider: Johnny Cardona


   Consulting Physician: Johnny Cardona


   Reason for Consult: CHF/AFIB





02/05/19 06:24


Podiatry Consult Routine 


   Comment: 


   Consulting Provider: Mellisa Islas


   Consulting Physician: Mellisa Islas


   Reason for Consult: LYMPHEDEMA





02/05/19 06:32


Wound Care [Nursing Referral for Wound Care] Routine 


   Comment: 


   Physician Instructions: 


   Reason For Exam: morbid obesity, chronic wounds in lower extremitie





02/05/19 06:51


Infectious Disease Consult Routine 


   Comment: 


   Consulting Provider: Ray Olvera


   Consulting Physician: Ray Olvera


   Reason for Consult: wound ulcers





02/06/19 06:00


Gastroenterology Consult Routine 


   Comment: 


   Consulting Provider: Darrion Whittaker


   Consulting Physician: Darrion Whittaker


   Reason for Consult: ANEMIA





02/06/19 07:39


Hematology Oncology Consult Routine 


   Comment: 


   Consulting Provider: Fred Orellana


   Consulting Physician: Fred Orellana


   Reason for Consult: ANEMIA





02/07/19 08:33


General Surgery Consult Routine 


   Comment: 


   Consulting Provider: Avni Heck


   Consulting Physician: Avni Heck


   Reason for Consult: patient had appt with Dr. Heck for wounds





02/11/19 02:07


Nursing Referral for Wound Care Routine 


   Comment: 


   Physician Instructions: 


   Reason For Exam: wound mgt.











Time Spent in preparation of Discharge (in minutes): 40





Diagnosis





- Discharge Diagnosis


(1) Anemia


Status: Acute   





(2) Atrial fibrillation


Status: Acute   





(3) CHF (congestive heart failure)


Status: Acute   





(4) Lymphedema


Status: Acute   





(5) Cellulitis and abscess of leg


Status: Acute   





Hospital Course





- Lab Results


Lab Results: 


                                  Micro Results





02/05/19 11:10   Naris   MRSA Culture (Admit) - Final


                            MRSA NOT DETECTED





                             Most Recent Lab Values











WBC  8.4 10^3/uL (4.5-11.0)   02/13/19  05:30    


 


RBC  5.14 10^6/uL (3.5-6.1)   02/13/19  05:30    


 


Hgb  10.3 g/dL (14.0-18.0)  L  02/13/19  05:30    


 


Hct  39.9 % (42.0-52.0)  L  02/13/19  05:30    


 


MCV  77.6 fl (80.0-105.0)  L  02/13/19  05:30    


 


MCH  20.0 pg (25.0-35.0)  L  02/13/19  05:30    


 


MCHC  25.8 g/dl (31.0-37.0)  L  02/13/19  05:30    


 


RDW  22.9 % (11.5-14.5)  H  02/13/19  05:30    


 


Plt Count  254 10^3/uL (120.0-450.0)   02/13/19  05:30    


 


MPV  9.6 fl (7.0-11.0)   02/13/19  05:30    


 


Neut % (Auto)  70.3 % (50.0-68.0)  H  02/13/19  05:30    


 


Lymph % (Auto)  21.9 % (22.0-35.0)  L  02/13/19  05:30    


 


Mono % (Auto)  7.6 % (1.0-6.0)  H  02/13/19  05:30    


 


Eos % (Auto)  0.2 % (1.5-5.0)  L  02/13/19  05:30    


 


Baso % (Auto)  0.0 % (0.0-3.0)   02/13/19  05:30    


 


Lymph # (Auto)  1.8  (1.2-3.4)   02/13/19  05:30    


 


Mono # (Auto)  0.6  (0.1-0.6)   02/13/19  05:30    


 


Eos # (Auto)  0.0  (0.0-0.7)   02/13/19  05:30    


 


Baso # (Auto)  0.00 K/mm3 (0.0-2.0)   02/13/19  05:30    


 


Absolute Neuts (auto)  5.91  (1.4-6.5)   02/13/19  05:30    


 


Retic Count  0.74 % (0.5-1.5)   02/07/19  07:00    


 


PT  23.6 SECONDS (9.4-12.5)  H  02/13/19  05:30    


 


INR  2.09   02/13/19  05:30    


 


APTT  66.3 Seconds (26.9-38.3)  H  02/13/19  05:30    


 


pCO2  68 mm/Hg (35-45)  H  02/07/19  05:00    


 


pO2  72.0 mm/Hg ()  L  02/07/19  05:00    


 


HCO3  36.7 mmol/L (21-28)  H  02/07/19  05:00    


 


ABG pH  7.34  (7.35-7.45)  L  02/07/19  05:00    


 


ABG Total CO2  38.8 mmol.L (22-28)  H  02/07/19  05:00    


 


ABG O2 Saturation  96.4 % (95-98)   02/07/19  05:00    


 


ABG O2 Content  10.7 ML/dl (15-23)  L  02/07/19  05:00    


 


ABG Base Excess  9.5 mmol/L (-2.0-3.0)  H  02/07/19  05:00    


 


ABG Hemoglobin  8.1 g/dL (11.7-17.4)  L  02/07/19  05:00    


 


ABG Carboxyhemoglobin  2.3 % (0.5-1.5)  H  02/07/19  05:00    


 


POC ABG HHb (Measured)  3.5 % (0-5)   02/07/19  05:00    


 


ABG Methemoglobin  1.1 % (0.0-3.0)   02/07/19  05:00    


 


ABG O2 Capacity  11.1 mL/dl (16-24)  L  02/07/19  05:00    


 


ABG Potassium  4.2 mmol/L (3.6-5.2)   02/05/19  15:10    


 


Hgb O2 Saturation  93.1 % (95.0-98.0)  L  02/07/19  05:00    


 


Sodium  137.0 mmol/L (132-148)   02/05/19  15:10    


 


Chloride  103.0 mmol/L ()   02/05/19  15:10    


 


Glucose  90 mg/dl ()   02/05/19  15:10    


 


Lactate  0.6 mmol/L (0.7-2.1)  L  02/05/19  15:10    


 


FiO2  36.0 %  02/07/19  05:00    


 


Inspiratory BiPAP  22 02/05/19  15:10    


 


Blood Gas Comments  Ms prisca viramontes(rrt) will bring abg results to ed   02/05/19  

05:35    


 


Crit Value Called To  Dr nils scott   02/07/19  05:00    


 


Crit Value Called By  Avni lott   02/07/19  05:00    


 


Blood Gas Notified Time  524   02/07/19  05:00    


 


Sodium  140 mmol/L (132-148)   02/13/19  05:30    


 


Potassium  3.8 mmol/L (3.6-5.0)   02/13/19  05:30    


 


Chloride  92 mmol/L ()  L  02/13/19  05:30    


 


Carbon Dioxide  43 mmol/L (21-33)  H  02/13/19  05:30    


 


Anion Gap  9  (10-20)  L  02/13/19  05:30    


 


BUN  23 mg/dL (7-21)  H  02/13/19  05:30    


 


Creatinine  0.9 mg/dl (0.8-1.5)   02/13/19  05:30    


 


Est GFR ( Amer)  > 60   02/13/19  05:30    


 


Est GFR (Non-Af Amer)  > 60   02/13/19  05:30    


 


Random Glucose  92 mg/dL ()   02/13/19  05:30    


 


Hemoglobin A1c  6.2 % (4.2-6.5)   02/05/19  06:00    


 


Lactic Acid  0.8 mmol/L (0.7-2.1)   02/05/19  06:45    


 


Calcium  9.3 mg/dL (8.4-10.5)   02/13/19  05:30    


 


Phosphorus  5.7 mg/dL (2.5-4.5)  H  02/13/19  05:30    


 


Magnesium  2.0 mg/dL (1.7-2.2)   02/13/19  05:30    


 


Iron  19 ug/dL ()  L  02/07/19  14:00    


 


TIBC  296 ug/dL (261-462)   02/07/19  14:00    


 


% Saturation  6 % (20-55)  L  02/07/19  14:00    


 


Sol Transferrin Receptr  10.60 mg/L (0.76-1.76)  H  02/05/19  10:10    


 


Erythropoietin  303.6 mIU/mL (2.6-18.5)  H  02/05/19  10:10    


 


Ferritin  21.9 ng/mL  02/07/19  14:00    


 


Total Bilirubin  0.8 mg/dL (0.2-1.3)   02/13/19  05:30    


 


Direct Bilirubin  0.1 mg/dL (0.0-0.4)   02/13/19  05:30    


 


AST  60 U/L (17-59)  H  02/13/19  05:30    


 


ALT  22 U/L (7-56)   02/13/19  05:30    


 


Alkaline Phosphatase  30 U/L ()  L  02/13/19  05:30    


 


Lactate Dehydrogenase  598 U/L (333-699)   02/05/19  03:10    


 


Total Creatine Kinase  67 U/L ()   02/05/19  17:30    


 


Troponin I  0.02 ng/mL  02/05/19  17:30    


 


NT-Pro-B Natriuret Pep  2910 pg/mL (0-450)  H  02/05/19  03:10    


 


Total Protein  7.6 g/dL (5.8-8.3)   02/13/19  05:30    


 


Total Protein (PEP)  7.1 g/dL (6.1-8.1)   02/07/19  14:00    


 


Albumin  3.5 g/dL (3.0-4.8)   02/13/19  05:30    


 


Albumin (PEP)  2.9 g/dL (3.8-4.8)  L  02/07/19  14:00    


 


Globulin  4.1 gm/dL  02/13/19  05:30    


 


Albumin/Globulin Ratio  0.9  (1.1-1.8)  L  02/13/19  05:30    


 


Alpha-1-Globulins  0.4 g/dL (0.2-0.3)  H  02/07/19  14:00    


 


Alpha-2-Globulins  0.7 g/dL (0.5-0.9)   02/07/19  14:00    


 


Beta-1-Globulin  0.5 g/dL (0.4-0.6)   02/07/19  14:00    


 


Beta-2-Globulin  0.5 g/dL (0.2-0.5)   02/07/19  14:00    


 


Gamma Globulins  2.1 g/dL (0.8-1.7)  H  02/07/19  14:00    


 


Abnorm Protein Band 1  TEST NOT PERFORMED   02/07/19  14:00    


 


Abnorm Protein Band 2  TEST NOT PERFORMED   02/07/19  14:00    


 


Abnorm Protein Band 3  TEST NOT PERFORMED   02/07/19  14:00    


 


Triglycerides  103 mg/dL ()   02/05/19  06:00    


 


Cholesterol  103 mg/dL (130-200)  L  02/05/19  06:00    


 


LDL Cholesterol Direct  67 mg/dL (0-129)   02/05/19  06:00    


 


HDL Cholesterol  12 mg/dL (29-60)  L  02/05/19  06:00    


 


Free PSA  <0.1 ng/mL  02/11/19  12:30    


 


% Free PSA  Unable to calculate % (calc) (>25)   02/11/19  12:30    


 


Total PSA  0.2 ng/mL (< or = 4.0)   02/11/19  12:30    


 


Vitamin B12  737 pg/mL (239-931)   02/07/19  14:00    


 


25-OH Vitamin D Total  5 ng/mL ()  L  02/05/19  11:00    


 


Folate  6.2 ng/mL  02/07/19  14:00    


 


Free T4  1.08 ng/dL (0.78-2.19)   02/05/19  10:10    


 


Thyroxine (T4)  3.9 ug/dL (5.5-11.0)  L  02/05/19  10:10    


 


TSH 3rd Generation  2.60 mIU/mL (0.46-4.68)   02/05/19  10:10    


 


Arterial Blood Potassium  4.2 mmol/L (3.6-5.2)   02/05/19  15:10    


 


LILIAM & SPEP Interp  See note   02/07/19  14:00    


 


Blood Type  A POSITIVE   02/05/19  06:00    


 


Antibody Screen  Negative   02/05/19  06:00    


 


Crossmatch  See Detail   02/05/19  06:00    


 


BBK History Checked  Patient has bt   02/05/19  06:00    














- Hospital Course


Hospital Course: 





Mr Jamison, 58M, PMHx morbid obesity of BMI 70s, b/l thigh cellulitis, and chronic

lymphedema, admitted on 2/5/19 for SOB. SOB is likely due to diastolic CHF 

exacerbation possibly caused by aortic regurgitation. Echocardiogram shows EF 

56, RVSP 36, with moderate to severe aortic regurgitation, moderated enlarged 

aortic root, and moderate concentric left ventricular hypertrophy. During the 

hospital stay, he had hypercapnic respiratory distress requiring BIPAP and ICU 

management, downgraded, now on NC 4L and BIPAP HS with 90% FiO2. He was found to

have a new onset A fib, [id number] score 2, on warfarin 10, bridging heparin gtt.





For cellulitis in thighs and b/l chronic venous dermatitis, Podiatry on board. 

Completed 5 days of Doxy. He is on silverdine, mupirocin, 

clotrimazole/betamethazone. He show follow up Dr Islas [facility] 

outpatient





For COPD/SOB, he is on duoneb q6. NC 4L day; Bipap HS. He is on tapering dose of

prednison





For CHF, he is on lasix 40 IV q8, metoprolol 25 BID, lipitor. He has a Metabolic

alkalosis due to lasix volume contraction. Add diamox x 5 days (day 2)





For symptomatic anemia, Hb 8.3 on admission with SOB/A-fib, received 1u pRBC. He

is on venofar IV, 5/5 bags on Monday 2/11.





He is Obese class 3, BMI 72.6. We conducted  for weight loss and 

appropriate diet











Discharge Exam





- Head Exam


Head Exam: ATRAUMATIC, NORMAL INSPECTION, NORMOCEPHALIC





- Eye Exam


Eye Exam: EOMI, Normal appearance, PERRL.  absent: Scleral icterus


Pupil Exam: NORMAL ACCOMODATION





- ENT Exam


ENT Exam: Mucous Membranes Moist





- Neck Exam


Additional comments: 





supple





- Respiratory Exam


Respiratory Exam: Decreased Breath Sounds (b/l lung bases), NORMAL BREATHING 

PATTERN (4L NC).  absent: Rales, Rhonchi, Wheezes





- Cardiovascular Exam


Cardiovascular Exam: REGULAR RHYTHM, +S1, +S2.  absent: Systolic Murmur





- GI/Abdominal Exam


GI & Abdominal Exam: Soft





- Extremities Exam


Extremities exam: pedal edema


Additional comments: 





on compression dressings





- Back Exam


Back exam: absent: CVA tenderness (L), CVA tenderness (R)





- Neurological Exam


Neurological exam: Alert, CN II-XII Intact, Oriented x3





- Psychiatric Exam


Psychiatric exam: Normal Affect, Normal Mood





- Skin


Skin Exam: Dry, Warm





Discharge Plan





- Follow Up Plan


Condition: GUARDED


Disposition: HOME/ ROUTINE


Additional Instructions: 


MAY DISCHARGE TO [facility] 


DISCHARGE MEDS AS PER UPDATED AMBULATORY ORDERS


FOLLOW UP  WITHIN 1 WEEK UPON DISCHARGE FROM [facility] 


diastolic CHF exacerbation possibly caused by aortic regurgitation. Pt needs to 

follow up with cardiology within 1 week of discharge from [facility]


Referrals: 


Varinder Mooney MD [Staff Provider] - 1 Week


(MAY DISCHARGE TO [facility] 


DISCHARGE MEDS AS PER UPDATED AMBULATORY ORDERS


FOLLOW UP  WITHIN 1 WEEK UPON DISCHARGE FROM [facility] )


Johnny Cardona MD [Staff Provider] - 


Mellisa Islas DPM [Staff Provider] - 


Darrion Whittaker MD [Staff Provider] -